# Patient Record
Sex: FEMALE | Race: WHITE | Employment: OTHER | URBAN - METROPOLITAN AREA
[De-identification: names, ages, dates, MRNs, and addresses within clinical notes are randomized per-mention and may not be internally consistent; named-entity substitution may affect disease eponyms.]

---

## 2017-01-18 ENCOUNTER — TRANSFERRED RECORDS (OUTPATIENT)
Dept: HEALTH INFORMATION MANAGEMENT | Facility: CLINIC | Age: 72
End: 2017-01-18

## 2017-01-24 NOTE — TELEPHONE ENCOUNTER
Medication was refilled on 12/25/2016, #90, with 3 additional refills.       amLODIPine (NORVASC) 2.5 MG tablet 90 tablet 3 12/22/2016

## 2017-01-25 RX ORDER — AMLODIPINE BESYLATE 2.5 MG/1
TABLET ORAL
Start: 2017-01-25

## 2017-02-02 ENCOUNTER — MEDICAL CORRESPONDENCE (OUTPATIENT)
Dept: HEALTH INFORMATION MANAGEMENT | Facility: CLINIC | Age: 72
End: 2017-02-02

## 2017-02-02 ENCOUNTER — TRANSFERRED RECORDS (OUTPATIENT)
Dept: HEALTH INFORMATION MANAGEMENT | Facility: CLINIC | Age: 72
End: 2017-02-02

## 2017-02-03 ENCOUNTER — OFFICE VISIT (OUTPATIENT)
Dept: FAMILY MEDICINE | Facility: CLINIC | Age: 72
End: 2017-02-03
Payer: COMMERCIAL

## 2017-02-03 ENCOUNTER — HOSPITAL ENCOUNTER (EMERGENCY)
Facility: CLINIC | Age: 72
Discharge: HOME | End: 2017-02-03
Attending: FAMILY MEDICINE | Admitting: FAMILY MEDICINE
Payer: COMMERCIAL

## 2017-02-03 ENCOUNTER — HOSPITAL ENCOUNTER (OUTPATIENT)
Facility: CLINIC | Age: 72
Setting detail: OBSERVATION
Discharge: HOME | End: 2017-02-03
Attending: INTERNAL MEDICINE | Admitting: INTERNAL MEDICINE
Payer: COMMERCIAL

## 2017-02-03 VITALS
SYSTOLIC BLOOD PRESSURE: 156 MMHG | RESPIRATION RATE: 16 BRPM | OXYGEN SATURATION: 97 % | TEMPERATURE: 98.4 F | WEIGHT: 154 LBS | HEART RATE: 108 BPM | BODY MASS INDEX: 26.29 KG/M2 | HEIGHT: 64 IN | DIASTOLIC BLOOD PRESSURE: 91 MMHG

## 2017-02-03 VITALS — OXYGEN SATURATION: 94 % | SYSTOLIC BLOOD PRESSURE: 174 MMHG | DIASTOLIC BLOOD PRESSURE: 96 MMHG

## 2017-02-03 VITALS
OXYGEN SATURATION: 96 % | SYSTOLIC BLOOD PRESSURE: 190 MMHG | TEMPERATURE: 97.9 F | DIASTOLIC BLOOD PRESSURE: 83 MMHG | RESPIRATION RATE: 16 BRPM

## 2017-02-03 DIAGNOSIS — G50.0 TRIGEMINAL NEURALGIA: ICD-10-CM

## 2017-02-03 DIAGNOSIS — G50.0 TRIGEMINAL NEURALGIA: Primary | ICD-10-CM

## 2017-02-03 DIAGNOSIS — R51.9 RIGHT FACIAL PAIN: ICD-10-CM

## 2017-02-03 DIAGNOSIS — R51.9 FACIAL PAIN, ACUTE: ICD-10-CM

## 2017-02-03 LAB
ALBUMIN SERPL-MCNC: 4.2 G/DL (ref 3.4–5)
ALP SERPL-CCNC: 71 U/L (ref 40–150)
ALT SERPL W P-5'-P-CCNC: 55 U/L (ref 0–50)
ANION GAP SERPL CALCULATED.3IONS-SCNC: 14 MMOL/L (ref 3–14)
AST SERPL W P-5'-P-CCNC: 16 U/L (ref 0–45)
BASOPHILS # BLD AUTO: 0 10E9/L (ref 0–0.2)
BASOPHILS NFR BLD AUTO: 0.4 %
BILIRUB SERPL-MCNC: 0.4 MG/DL (ref 0.2–1.3)
BUN SERPL-MCNC: 19 MG/DL (ref 7–30)
CALCIUM SERPL-MCNC: 9.5 MG/DL (ref 8.5–10.1)
CHLORIDE SERPL-SCNC: 103 MMOL/L (ref 94–109)
CO2 SERPL-SCNC: 23 MMOL/L (ref 20–32)
CREAT SERPL-MCNC: 0.88 MG/DL (ref 0.52–1.04)
CRP SERPL-MCNC: <2.9 MG/L (ref 0–8)
DIFFERENTIAL METHOD BLD: ABNORMAL
EOSINOPHIL # BLD AUTO: 0 10E9/L (ref 0–0.7)
EOSINOPHIL NFR BLD AUTO: 0.3 %
ERYTHROCYTE [DISTWIDTH] IN BLOOD BY AUTOMATED COUNT: 13.9 % (ref 10–15)
ERYTHROCYTE [SEDIMENTATION RATE] IN BLOOD BY WESTERGREN METHOD: 7 MM/H (ref 0–30)
GFR SERPL CREATININE-BSD FRML MDRD: 63 ML/MIN/1.7M2
GLUCOSE SERPL-MCNC: 109 MG/DL (ref 70–99)
HCT VFR BLD AUTO: 42.8 % (ref 35–47)
HGB BLD-MCNC: 14.5 G/DL (ref 11.7–15.7)
IMM GRANULOCYTES # BLD: 0 10E9/L (ref 0–0.4)
IMM GRANULOCYTES NFR BLD: 0.2 %
LYMPHOCYTES # BLD AUTO: 1.9 10E9/L (ref 0.8–5.3)
LYMPHOCYTES NFR BLD AUTO: 18.8 %
MCH RBC QN AUTO: 30.5 PG (ref 26.5–33)
MCHC RBC AUTO-ENTMCNC: 33.9 G/DL (ref 31.5–36.5)
MCV RBC AUTO: 90 FL (ref 78–100)
MONOCYTES # BLD AUTO: 0.9 10E9/L (ref 0–1.3)
MONOCYTES NFR BLD AUTO: 8.5 %
NEUTROPHILS # BLD AUTO: 7.2 10E9/L (ref 1.6–8.3)
NEUTROPHILS NFR BLD AUTO: 71.8 %
NRBC # BLD AUTO: 0.1 10*3/UL
NRBC BLD AUTO-RTO: 1 /100
PLATELET # BLD AUTO: 311 10E9/L (ref 150–450)
POTASSIUM SERPL-SCNC: 3.7 MMOL/L (ref 3.4–5.3)
PROT SERPL-MCNC: 7.9 G/DL (ref 6.8–8.8)
RBC # BLD AUTO: 4.75 10E12/L (ref 3.8–5.2)
SODIUM SERPL-SCNC: 140 MMOL/L (ref 133–144)
WBC # BLD AUTO: 10 10E9/L (ref 4–11)

## 2017-02-03 PROCEDURE — G0378 HOSPITAL OBSERVATION PER HR: HCPCS

## 2017-02-03 PROCEDURE — 99285 EMERGENCY DEPT VISIT HI MDM: CPT | Mod: 25 | Performed by: FAMILY MEDICINE

## 2017-02-03 PROCEDURE — 96374 THER/PROPH/DIAG INJ IV PUSH: CPT | Performed by: FAMILY MEDICINE

## 2017-02-03 PROCEDURE — 99207 ZZC NON-BILLABLE SERV PER CHARTING: CPT | Performed by: INTERNAL MEDICINE

## 2017-02-03 PROCEDURE — 85652 RBC SED RATE AUTOMATED: CPT | Performed by: FAMILY MEDICINE

## 2017-02-03 PROCEDURE — 96376 TX/PRO/DX INJ SAME DRUG ADON: CPT | Performed by: FAMILY MEDICINE

## 2017-02-03 PROCEDURE — 36415 COLL VENOUS BLD VENIPUNCTURE: CPT | Performed by: PHYSICIAN ASSISTANT

## 2017-02-03 PROCEDURE — 25000128 H RX IP 250 OP 636: Performed by: FAMILY MEDICINE

## 2017-02-03 PROCEDURE — 99284 EMERGENCY DEPT VISIT MOD MDM: CPT | Mod: Z6 | Performed by: FAMILY MEDICINE

## 2017-02-03 PROCEDURE — 85025 COMPLETE CBC W/AUTO DIFF WBC: CPT | Performed by: FAMILY MEDICINE

## 2017-02-03 PROCEDURE — 40000268 ZZH STATISTIC NO CHARGES

## 2017-02-03 PROCEDURE — 86140 C-REACTIVE PROTEIN: CPT | Performed by: FAMILY MEDICINE

## 2017-02-03 PROCEDURE — 80053 COMPREHEN METABOLIC PANEL: CPT | Performed by: FAMILY MEDICINE

## 2017-02-03 PROCEDURE — 85027 COMPLETE CBC AUTOMATED: CPT | Performed by: PHYSICIAN ASSISTANT

## 2017-02-03 PROCEDURE — 99215 OFFICE O/P EST HI 40 MIN: CPT | Performed by: INTERNAL MEDICINE

## 2017-02-03 RX ORDER — LIDOCAINE 40 MG/G
CREAM TOPICAL
Status: DISCONTINUED | OUTPATIENT
Start: 2017-02-03 | End: 2017-02-03

## 2017-02-03 RX ORDER — NALOXONE HYDROCHLORIDE 0.4 MG/ML
.1-.4 INJECTION, SOLUTION INTRAMUSCULAR; INTRAVENOUS; SUBCUTANEOUS
Status: DISCONTINUED | OUTPATIENT
Start: 2017-02-03 | End: 2017-02-03

## 2017-02-03 RX ORDER — OXYCODONE HYDROCHLORIDE 5 MG/1
5-10 TABLET ORAL
Status: DISCONTINUED | OUTPATIENT
Start: 2017-02-03 | End: 2017-02-03

## 2017-02-03 RX ORDER — HYDROMORPHONE HYDROCHLORIDE 1 MG/ML
0.5 INJECTION, SOLUTION INTRAMUSCULAR; INTRAVENOUS; SUBCUTANEOUS
Status: DISCONTINUED | OUTPATIENT
Start: 2017-02-03 | End: 2017-02-03 | Stop reason: HOSPADM

## 2017-02-03 RX ORDER — ONDANSETRON 4 MG/1
4 TABLET, ORALLY DISINTEGRATING ORAL EVERY 6 HOURS PRN
Status: DISCONTINUED | OUTPATIENT
Start: 2017-02-03 | End: 2017-02-03

## 2017-02-03 RX ORDER — ONDANSETRON 2 MG/ML
4 INJECTION INTRAMUSCULAR; INTRAVENOUS EVERY 6 HOURS PRN
Status: DISCONTINUED | OUTPATIENT
Start: 2017-02-03 | End: 2017-02-03

## 2017-02-03 RX ORDER — AMOXICILLIN 250 MG
1-2 CAPSULE ORAL 2 TIMES DAILY PRN
Status: DISCONTINUED | OUTPATIENT
Start: 2017-02-03 | End: 2017-02-03

## 2017-02-03 RX ORDER — HYDROMORPHONE HYDROCHLORIDE 2 MG/1
2 TABLET ORAL EVERY 4 HOURS PRN
Qty: 18 TABLET | Refills: 0 | Status: SHIPPED | OUTPATIENT
Start: 2017-02-03 | End: 2017-02-16

## 2017-02-03 RX ORDER — SODIUM CHLORIDE 9 MG/ML
INJECTION, SOLUTION INTRAVENOUS CONTINUOUS
Status: DISCONTINUED | OUTPATIENT
Start: 2017-02-03 | End: 2017-02-03

## 2017-02-03 RX ORDER — HYDROMORPHONE HYDROCHLORIDE 1 MG/ML
.3-.5 INJECTION, SOLUTION INTRAMUSCULAR; INTRAVENOUS; SUBCUTANEOUS
Status: DISCONTINUED | OUTPATIENT
Start: 2017-02-03 | End: 2017-02-03

## 2017-02-03 RX ORDER — PROCHLORPERAZINE MALEATE 5 MG
5 TABLET ORAL EVERY 6 HOURS PRN
Status: DISCONTINUED | OUTPATIENT
Start: 2017-02-03 | End: 2017-02-03

## 2017-02-03 RX ORDER — OXCARBAZEPINE 300 MG/1
300 TABLET, FILM COATED ORAL 2 TIMES DAILY
Qty: 60 TABLET | Refills: 0 | Status: SHIPPED | OUTPATIENT
Start: 2017-02-03 | End: 2017-03-03

## 2017-02-03 RX ORDER — PROCHLORPERAZINE 25 MG
12.5 SUPPOSITORY, RECTAL RECTAL EVERY 12 HOURS PRN
Status: DISCONTINUED | OUTPATIENT
Start: 2017-02-03 | End: 2017-02-03

## 2017-02-03 RX ORDER — ACETAMINOPHEN 325 MG/1
650 TABLET ORAL EVERY 4 HOURS PRN
Status: DISCONTINUED | OUTPATIENT
Start: 2017-02-03 | End: 2017-02-03

## 2017-02-03 RX ADMIN — HYDROMORPHONE HYDROCHLORIDE 0.5 MG: 10 INJECTION, SOLUTION INTRAMUSCULAR; INTRAVENOUS; SUBCUTANEOUS at 14:29

## 2017-02-03 RX ADMIN — HYDROMORPHONE HYDROCHLORIDE 1 MG: 1 INJECTION, SOLUTION INTRAMUSCULAR; INTRAVENOUS; SUBCUTANEOUS at 13:10

## 2017-02-03 ASSESSMENT — ENCOUNTER SYMPTOMS
FACIAL SWELLING: 0
ABDOMINAL PAIN: 0
FEVER: 0
ARTHRALGIAS: 0
DIFFICULTY URINATING: 0
CONFUSION: 0
TROUBLE SWALLOWING: 0
EYE REDNESS: 0
CHILLS: 0
SHORTNESS OF BREATH: 0
HEADACHES: 0
NECK STIFFNESS: 0
UNEXPECTED WEIGHT CHANGE: 1
COLOR CHANGE: 0

## 2017-02-03 NOTE — ED NOTES
"Pt seen at [facility] ED today for trigeminal neuralgia, admitted to OBS briefly, was not given any pain mediation at [facility]  Sent to Ed for pain relief and possible admission/surgery  Pt seen at pain clinic, given morphine, \"but it didn't do a thing\"  Arrives with ice pack applied to face  "

## 2017-02-03 NOTE — CONSULTS
Read through pts history. She has pain due to trigeminal neuralgia. She has been treating at the [facility] pain clinic with Dr. Carroll August.  She has had 5 trigeminal RF's.  She continues to have severe pain. I spoke to Dr. Unruly Atkinson directly on the phone. He states that he would not be able to treat or help her pain and would recommend she see someone at the [facility] neurosurgery group. I called to obs and spoke to the pts nurse and Chapincito Mendez PA-C. Recommend a transfer to the [facility] neurosurgery group. Please let me know if you need further assistance.       Latrice Meyer [facility]  [facility]  [address] [place] [address].  [id number]  Tel. [phone]  Fax [phone]

## 2017-02-03 NOTE — CONSULTS
[facility]       NEUROSURGERY CONSULTATION NOTE    This consultation was requested by Dr. Ruiz from the Emergency Medicine service.    Reason for Consultation:   Right sided trigeminal neuralgia    HPI:  Ms. Rodriguez is a 73 yo female with h/o HTN, MDD, anxiety, TMJ and right trigeminal neuralgia who presents with an acute exacerbation of trigeminal neuralgia.  She has had trigeminal neuralgia for about 10 years and has undergone multiple radiofrequency rhizotomies most recently 2016.  She more recently underwent trigeminal nerve block  with no pain relief.  The pain is triggered by eating, cold wind, and touching her face.  In the past, she trialed Gabapentin and Tegretol with no relief.  She has not tried Trileptal.  When I interviewed her in the ED her pain was much improved after pain medications.                  PAST MEDICAL HISTORY:   Past Medical History   Diagnosis Date     Hypertension      Anxiety      TMJ arthralgia      Iron deficiency anaemia      Had endoscopy and colonoscopy done in 2012      Arthritis      Cancer (H) Aug 2013     right breast       PAST SURGICAL HISTORY:   Past Surgical History   Procedure Laterality Date     Orthopedic surgery       Right TKA and Left TKA      section       Colonoscopy       She had colonoscopy and endoscopy done on November 15, 2012 for workup of iron deficiency anemia and the results were satisfactory     Mastectomy partial with sentinel node  2013     Procedure: MASTECTOMY PARTIAL WITH SENTINEL NODE;  RIGHT PARTIAL MASTECTOMY WITH RIGHT AXILLARY SENTINEL NODE BIOPSY;  Surgeon: Kae Padilla MD;  [facility]     Biopsy  13     right breast biopsy     Biopsy  13     right axillary lymph node     Breast surgery  13     right breast partial mastectomy/lumpectomy     Gyn surgery            Hysterectomy total abdominal, bilateral salpingo-oophorectomy,  combined  2006       FAMILY HISTORY:   Family History   Problem Relation Age of Onset     Cardiovascular Father      Alzheimer Disease Mother      Cancer - colorectal No family hx of      Breast Cancer No family hx of        SOCIAL HISTORY:   Social History   Substance Use Topics     Smoking status: Never Smoker      Smokeless tobacco: Never Used     Alcohol Use: 0.6 oz/week     1 Standard drinks or equivalent per week      Comment: 2-3 drinks wine per week       MEDICATIONS:  Current Outpatient Prescriptions   Medication Sig Dispense Refill     pravastatin (PRAVACHOL) 40 MG tablet Take 1 tablet (40 mg) by mouth daily 90 tablet 3     lisinopril (PRINIVIL/ZESTRIL) 40 MG tablet Take 1 tablet (40 mg) by mouth daily 90 tablet 3     amLODIPine (NORVASC) 2.5 MG tablet Take 1 tablet (2.5 mg) by mouth daily 90 tablet 3     LORazepam (ATIVAN) 1 MG tablet Take 0.5 tablets (0.5 mg) by mouth nightly as needed for anxiety 30 tablet 2     hydrochlorothiazide (HYDRODIURIL) 25 MG tablet Take 1 tablet (25 mg) by mouth daily 90 tablet 1     polyethylene glycol (MIRALAX) powder Take 17 g by mouth daily as needed 510 g 1     multivitamin, therapeutic with minerals (MULTI-VITAMIN) TABS Take 1 tablet by mouth daily       chromium 200 MCG CAPS Take 1 capsule (200 mcg) by mouth daily  0     Coenzyme Q10 (CO Q10) 200 MG CAPS Take 1 capsule by mouth daily       fish oil-omega-3 fatty acids (OMEGA 3) 1000 MG capsule Take 1 capsule (1 g) by mouth daily       ascorbic acid (VITAMIN C) 500 MG tablet Take 1 tablet (500 mg) by mouth daily       venlafaxine (EFFEXOR XR) 150 MG 24 hr capsule Take  by mouth daily.         Allergies:  Allergies   Allergen Reactions     Seafood Anaphylaxis     Femara [Letrozole] Rash     Latex Rash     WITH LATEX BANDAIDS     Tamoxifen Rash         ROS: 10 point ROS of systems including Constitutional, Eyes, Respiratory, Cardiovascular, Gastroenterology, Genitourinary, Integumentary, Muscularskeletal, Psychiatric were  all negative except for pertinent positives noted in my HPI.      PE:  Blood pressure 175/94, last menstrual period 09/01/2000, SpO2 94 %, not currently breastfeeding.  Gen: Elderly female laying in bed, not in acute distress  MS: A&Ox3, Speech is slow, intermittently confused    CN: Pupils round and reactive, extraocular movements intact, face symmetric, tongue midline, uvula & palate elevate symmetrically, sensation intact to light touch   Motor: 5/5 in b/l UE& LEs (see below)  Sensory: intact to light touch   No drift    Non labored breathing      Imaging:   MRI brain with and w/o contrast 11/6/2014  - No evidence of intracranial mass or demyelinating disease    ASSESSMENT:   Ms. Rodriguez is a 71 yo female with an acute exacerbation of trigeminal neuralgia.    RECOMMENDATIONS:  - Start Trileptal, Neurosurgery clinic will contact patient on Monday for titration schedule  - Schedule MRI with and without contrast as an outpatient next week  - Follow up in Neurosurgery with Dr. Casper 2/13, 2/14, or 2/15.  Neurosurgery clinic will contact patient to set this up on Monday      The patient was discussed with Dr. Theodore, neurosurgery chief resident, and she agrees with the above.    Jaime Vegas MD,PhD  Neurosurgery PGY-1        Please contact neurosurgery resident on call with questions.    Dial * [phone], enter [phone] when prompted.

## 2017-02-03 NOTE — PROGRESS NOTES
I was called earlier today by Dr. Villalobos to admit this patient for pain control and neurosurgery evaluation for complex and uncontrolled trigeminal neuralgia.  We discussed the case with neurosurgery CNP Latrice Meyer and they do not feel like they can care for the patient here and requested she go to [facility].  I discussed this with the patient and her  and she agrees that she will just go to the [facility] ED for evaluation and consideration of admission and further evaluation by neurosurgery at [facility]. I instructed the patient that she can go ahead and take two of her 10 mg morphine pills to help with the pain and her  will transport her to the [facility] ED for evaluation.  We will not do any admission orders and we will instead cancel this admission and I have requested that the patient not be bill for this encounter. I did call the [facility] ED and talked to Dr. Chester to let them know I am sending this patient to them and I called Dr. Villalobos to update her as well.    Davion Lozano MD   2/03/17 11:36 AM

## 2017-02-03 NOTE — DISCHARGE INSTRUCTIONS
Please make an appointment to follow up with Neurosurgery Clinic (phone: [phone]) as soon as possible to follow up on the trigeminal neuralgia.  Your labs are normal.  The neurosurgery clinic will contact you on Monday to make an appointment for MRI and an outpatient neurosurgery appointment.  Start the Trileptal twice a day to help reduce the pain.  The medication can make you tired, but this is expected.  You also have been given Dilaudid for the pain control and you can use this every 4-6 hours.  Should the pain become intractable or not controlled with these medicines before your next appointment come back to the ER to be seen.

## 2017-02-03 NOTE — ED PROVIDER NOTES
"  History     Chief Complaint   Patient presents with     Headache     trigeminal neuroalgia      HPI  Olivia Rodriguez is a 72 year old female with a medical history significant for trigeminal neuralgia who presents to the emergency department for evaluation of acute on chronic right-sided facial pain. Patient's  relates a nearly 10 year history of chronic right-sided facial pain from trigeminal neuralgia that he and his wife believe to have been provoked by dental work. Patient's  reports that the patient responded well to annual radio-frequency ablations in 2013, 2014, and 2014 (all through [facility]), but had no improvement whatsoever after the same procedures were done in 7/2016, 12/2016, and on 1/21/17 (2 weeks ago). Patient's  states that she then had a trigeminal nerve block with no improvement. Patient presents with documentation from [facility] clinic reporting that they were referring her to a neurosurgeon to discuss possible resection of the right trigeminal nerve due to failure of injections and opiate pain medication. Patient's  thinks that the patient's most recent MRI of the head and neck was 2 years ago, obtained somewhere in [place]. He does not recall any significant findings on that MRI.    Patient reports that her pain is greatest over the right cheek and right jaw. She denies radiation of the pain into the posterior neck or ear. She denies fever. Patient reports eating and opening her mouth are both very painful, and her  notes that she has lost 25 pounds in the last month. Patient's  notes that the patient's grandmother suffered from \"the exact same thing.\"     I have reviewed the Medications, Allergies, Past Medical and Surgical History, and Social History in the [employer] system.    PAST MEDICAL HISTORY:   Past Medical History   Diagnosis Date     Hypertension      Anxiety      TMJ arthralgia      Iron deficiency anaemia      Had endoscopy and " colonoscopy done in 2012      Arthritis      Cancer (H) Aug 2013     right breast     PAST SURGICAL HISTORY:   Past Surgical History   Procedure Laterality Date     Orthopedic surgery       Right TKA and Left TKA      section       Colonoscopy       She had colonoscopy and endoscopy done on November 15, 2012 for workup of iron deficiency anemia and the results were satisfactory     Mastectomy partial with sentinel node  2013     Procedure: MASTECTOMY PARTIAL WITH SENTINEL NODE;  RIGHT PARTIAL MASTECTOMY WITH RIGHT AXILLARY SENTINEL NODE BIOPSY;  Surgeon: Kae Padilla MD;  [facility]:  [facility]     Biopsy  13     right breast biopsy     Biopsy  13     right axillary lymph node     Breast surgery  13     right breast partial mastectomy/lumpectomy     Gyn surgery            Hysterectomy total abdominal, bilateral salpingo-oophorectomy, combined       FAMILY HISTORY:   Family History   Problem Relation Age of Onset     Cardiovascular Father      Alzheimer Disease Mother      Cancer - colorectal No family hx of      Breast Cancer No family hx of      SOCIAL HISTORY:   Social History   Substance Use Topics     Smoking status: Never Smoker      Smokeless tobacco: Never Used     Alcohol Use: 0.6 oz/week     1 Standard drinks or equivalent per week      Comment: 2-3 drinks wine per week     Discharge Medication List as of 2/3/2017  5:49 PM      START taking these medications    Details   HYDROmorphone (DILAUDID) 2 MG tablet Take 1 tablet (2 mg) by mouth every 4 hours as needed for pain maximum 4 tablet(s) per day, Disp-18 tablet, R-0, Local Print      OXcarbazepine (TRILEPTAL) 300 MG tablet Take 1 tablet (300 mg) by mouth 2 times daily, Disp-60 tablet, R-0, Local Print         CONTINUE these medications which have NOT CHANGED    Details   pravastatin (PRAVACHOL) 40 MG tablet Take 1 tablet (40 mg) by mouth daily, Disp-90 tablet, R-3, E-PrescribeProfile Rx: patient will contact  pharmacy when needed      lisinopril (PRINIVIL/ZESTRIL) 40 MG tablet Take 1 tablet (40 mg) by mouth daily, Disp-90 tablet, R-3, E-PrescribeProfile Rx: patient will contact pharmacy when needed      amLODIPine (NORVASC) 2.5 MG tablet Take 1 tablet (2.5 mg) by mouth daily, Disp-90 tablet, R-3, E-PrescribeProfile Rx: patient will contact pharmacy when needed      LORazepam (ATIVAN) 1 MG tablet Take 0.5 tablets (0.5 mg) by mouth nightly as needed for anxiety, Disp-30 tablet, R-2, Local Print      hydrochlorothiazide (HYDRODIURIL) 25 MG tablet Take 1 tablet (25 mg) by mouth daily, Disp-90 tablet, R-1, E-Prescribe      polyethylene glycol (MIRALAX) powder Take 17 g by mouth daily as needed, Disp-510 g, R-1, E-Prescribe      multivitamin, therapeutic with minerals (MULTI-VITAMIN) TABS Take 1 tablet by mouth daily, Historical      chromium 200 MCG CAPS Take 1 capsule (200 mcg) by mouth daily, R-0, Historical      Coenzyme Q10 (CO Q10) 200 MG CAPS Take 1 capsule by mouth daily, Historical      fish oil-omega-3 fatty acids (OMEGA 3) 1000 MG capsule Take 1 capsule (1 g) by mouth daily, Historical      ascorbic acid (VITAMIN C) 500 MG tablet Take 1 tablet (500 mg) by mouth daily, Historical      venlafaxine (EFFEXOR XR) 150 MG 24 hr capsule Take  by mouth daily., Historical           Allergies   Allergen Reactions     Seafood Anaphylaxis     Femara [Letrozole] Rash     Latex Rash     WITH LATEX BANDAIDS     Tamoxifen Rash     Review of Systems   Constitutional: Positive for unexpected weight change. Negative for fever and chills.   HENT: Negative for congestion, ear pain, facial swelling, mouth sores and trouble swallowing.         Right-sided facial pain   Eyes: Negative for redness.   Respiratory: Negative for shortness of breath.    Cardiovascular: Negative for chest pain.   Gastrointestinal: Negative for abdominal pain.   Genitourinary: Negative for difficulty urinating.   Musculoskeletal: Negative for arthralgias and neck  stiffness.   Skin: Negative for color change.   Neurological: Negative for headaches.   Psychiatric/Behavioral: Negative for confusion.       Physical Exam      Physical Exam   Constitutional: She is oriented to person, place, and time. No distress.   HENT:   Head: Atraumatic.   Mouth/Throat: Oropharynx is clear and moist. No oropharyngeal exudate.   Eyes: Pupils are equal, round, and reactive to light. No scleral icterus.   Cardiovascular: Normal heart sounds and intact distal pulses.    Pulmonary/Chest: Breath sounds normal. No respiratory distress.   Abdominal: Soft. Bowel sounds are normal. There is no tenderness.   Musculoskeletal: She exhibits no edema or tenderness.   Neurological: She is alert and oriented to person, place, and time. She has normal strength and normal reflexes. No cranial nerve deficit. She displays a negative Romberg sign. GCS eye subscore is 4. GCS verbal subscore is 5. GCS motor subscore is 6.   Patient has distinct tenderness overlying the nerve distribution of the right  Trigeminal nerve.  No numbness over the face and no motor deficits   Skin: Skin is warm. No rash noted. She is not diaphoretic.       ED Course     Procedures             Critical Care time:  none               Labs Ordered and Resulted from Time of ED Arrival Up to the Time of Departure from the ED   CBC WITH PLATELETS DIFFERENTIAL - Abnormal; Notable for the following:     Nucleated RBCs 1 (*)     All other components within normal limits   COMPREHENSIVE METABOLIC PANEL - Abnormal; Notable for the following:     Glucose 109 (*)     ALT 55 (*)     All other components within normal limits   ERYTHROCYTE SEDIMENTATION RATE AUTO   CRP INFLAMMATION       Assessments & Plan (with Medical Decision Making)  Right facial trigeminal nerve neuralgia:  This patient is a 72 year old female with a history of right facial pain not responsive to nerve ablation or nerve blocks.  See hx of HPI above.  The patient had stable vital signs  and appeared in significant distress on exam.  The patient was given several doses of IV Dilaudid with a good response.  The patient had labs including a CBC, CMP and ESR, CRP inflammatory marker all being normal.  The patient was seen by neurosurgery and they recommended that the patient be started on Trileptal 300 mg orally BID and they will see her in one week to see how she is responding.  They also discussed surgical options and recommended for an MRI to be set up in the outpatient setting.  The patient was comfortable at the time of discharge and was given Trileptal 300 mg orally BID #60 and also Dilaudid 2 mg every 6 hours for pain control if needed.  She was given instructions for follow up.  She was also given instructions for follow up and instructed on the use of the medications.        I have reviewed the nursing notes.    I have reviewed the findings, diagnosis, plan and need for follow up with the patient.    Discharge Medication List as of 2/3/2017  5:49 PM      START taking these medications    Details   HYDROmorphone (DILAUDID) 2 MG tablet Take 1 tablet (2 mg) by mouth every 4 hours as needed for pain maximum 4 tablet(s) per day, Disp-18 tablet, R-0, Local Print      OXcarbazepine (TRILEPTAL) 300 MG tablet Take 1 tablet (300 mg) by mouth 2 times daily, Disp-60 tablet, R-0, Local Print             Final diagnoses:   Trigeminal neuralgia   Right facial pain     IDany, am serving as a trained medical scribe to document services personally performed by David Ruiz MD, based on the provider's statements to me.   IDavid MD, was physically present and have reviewed and verified the accuracy of this note documented by Dany Leyva.  2/3/2017   [facility], [place], EMERGENCY DEPARTMENT      David Ruiz MD  02/03/17 [id number]

## 2017-02-03 NOTE — PROGRESS NOTES
Patient arrived on the unit @1020. Direct admission to observation unit.  Will page the MD for orders.

## 2017-02-03 NOTE — ED AVS SNAPSHOT
[facility], Emergency Department    [address] [address]    Phone:  [phone]                                       Olivia Rodriguez   MRN: [id number]    Department:  [facility], Emergency Department   Date of Visit:  2/3/2017           Patient Information     Date Of Birth          1945        Your diagnoses for this visit were:     Trigeminal neuralgia     Right facial pain        You were seen by David Ruiz MD.        Discharge Instructions       Please make an appointment to follow up with Neurosurgery Clinic (phone: [phone]) as soon as possible to follow up on the trigeminal neuralgia.  Your labs are normal.  The neurosurgery clinic will contact you on Monday to make an appointment for MRI and an outpatient neurosurgery appointment.  Start the Trileptal twice a day to help reduce the pain.  The medication can make you tired, but this is expected.  You also have been given Dilaudid for the pain control and you can use this every 4-6 hours.  Should the pain become intractable or not controlled with these medicines before your next appointment come back to the ER to be seen.       Future Appointments        Provider Department Dept Phone Center    3/27/2017 10:30 AM [facility] BREAST CTR MAMMO ROOM 6 [facility] [phone] [facility]      24 Hour Appointment Hotline       To make an appointment at any [facility] clinic, call [phone] ([phone]). If you don't have a family doctor or clinic, we will help you find one. [facility] clinics are conveniently located to serve the needs of you and your family.             Review of your medicines      START taking        Dose / Directions Last dose taken    HYDROmorphone 2 MG tablet   Commonly known as:  DILAUDID   Dose:  2 mg   Quantity:  18 tablet        Take 1 tablet (2 mg) by mouth every 4 hours as needed for pain maximum 4 tablet(s) per day   Refills:  0        OXcarbazepine 300 MG tablet    Commonly known as:  TRILEPTAL   Dose:  300 mg   Quantity:  60 tablet        Take 1 tablet (300 mg) by mouth 2 times daily   Refills:  0          Our records show that you are taking the medicines listed below. If these are incorrect, please call your family doctor or clinic.        Dose / Directions Last dose taken    amLODIPine 2.5 MG tablet   Commonly known as:  NORVASC   Dose:  2.5 mg   Quantity:  90 tablet        Take 1 tablet (2.5 mg) by mouth daily   Refills:  3        ascorbic acid 500 MG tablet   Commonly known as:  VITAMIN C   Dose:  500 mg        Take 1 tablet (500 mg) by mouth daily   Refills:  0        chromium 200 MCG Caps capsule   Dose:  200 mcg        Take 1 capsule (200 mcg) by mouth daily   Refills:  0        Co Q10 200 MG Caps   Dose:  1 capsule        Take 1 capsule by mouth daily   Refills:  0        EFFEXOR  MG 24 hr capsule   Generic drug:  venlafaxine        Take  by mouth daily.   Refills:  0        fish oil-omega-3 fatty acids 1000 MG capsule   Dose:  1 g        Take 1 capsule (1 g) by mouth daily   Refills:  0        hydrochlorothiazide 25 MG tablet   Commonly known as:  HYDRODIURIL   Dose:  25 mg   Quantity:  90 tablet        Take 1 tablet (25 mg) by mouth daily   Refills:  1        lisinopril 40 MG tablet   Commonly known as:  PRINIVIL/ZESTRIL   Dose:  40 mg   Quantity:  90 tablet        Take 1 tablet (40 mg) by mouth daily   Refills:  3        LORazepam 1 MG tablet   Commonly known as:  ATIVAN   Dose:  0.5 mg   Quantity:  30 tablet        Take 0.5 tablets (0.5 mg) by mouth nightly as needed for anxiety   Refills:  2        Multi-vitamin Tabs tablet   Dose:  1 tablet        Take 1 tablet by mouth daily   Refills:  0        polyethylene glycol powder   Commonly known as:  MIRALAX   Dose:  1 capful   Quantity:  510 g        Take 17 g by mouth daily as needed   Refills:  1        pravastatin 40 MG tablet   Commonly known as:  PRAVACHOL   Dose:  40 mg   Quantity:  90 tablet         "Take 1 tablet (40 mg) by mouth daily   Refills:  3                Prescriptions were sent or printed at these locations (2 Prescriptions)                   Other Prescriptions                Printed at Department/Unit printer (2 of 2)         HYDROmorphone (DILAUDID) 2 MG tablet               OXcarbazepine (TRILEPTAL) 300 MG tablet                Procedures and tests performed during your visit     CBC with platelets differential    CRP inflammation    Comprehensive metabolic panel    Erythrocyte sedimentation rate auto      Orders Needing Specimen Collection     None      Pending Results     No orders found from 2017 to 2017.            Pending Culture Results     No orders found from 2017 to 2017.            Thank you for choosing [facility]       Thank you for choosing [facility] for your care. Our goal is always to provide you with excellent care. Hearing back from our patients is one way we can continue to improve our services. Please take a few minutes to complete the written survey that you may receive in the mail after you visit with us. Thank you!        [employer] Information     [employer] lets you send messages to your doctor, view your test results, renew your prescriptions, schedule appointments and more. To sign up, go to [email or web address] . Click on \"Log in\" on the left side of the screen, which will take you to the Welcome page. Then click on \"Sign up Now\" on the right side of the page.     You will be asked to enter the access code listed below, as well as some personal information. Please follow the directions to create your username and password.     Your access code is: [email or web address]  Expires: 3/22/2017  2:47 PM     Your access code will  in 90 days. If you need help or a new code, please call your [facility] clinic or [phone].        Care EveryWhere ID     This is your Care EveryWhere ID. This could be used by other organizations to access your [facility] medical " records  [id number]        After Visit Summary       This is your record. Keep this with you and show to your community pharmacist(s) and doctor(s) at your next visit.

## 2017-02-03 NOTE — ED NOTES
Bed: IN01  Expected date: 2/3/17  Expected time: 12:30 PM  Means of arrival: Car  Comments:  CC:   Patient 72 y female with trigeminal neuralgia (complex) seen at [facility] for this.  Patient came to [facility] expecting admission, but neurosurgery doesn't care for [place] patients.  Patient not in ER or inpatient and was directed here for care.  Patient has 25 # weight loss, unable to eat because of the pain.  Flare up ongoing for 2 weeks.  ETA:  One half hour   Sender/Contact Info:  [facility] hospitalist            Wants Callback (Y/N)? No  Plan:  Evaluate and treat for pain, neurosurgery consult   Call taken by: David Ruiz MD   Patient Name:  Jairo Rodriguez  MR#  [id number]

## 2017-02-03 NOTE — ED AVS SNAPSHOT
[facility], [place], Emergency Department    [address] [place] [address]    Phone:  [phone]                                       Olivia Rodriguez   MRN: [id number]    Department:  [facility], Emergency Department   Date of Visit:  2/3/2017           After Visit Summary Signature Page     I have received my discharge instructions, and my questions have been answered. I have discussed any challenges I see with this plan with the nurse or doctor.    ..........................................................................................................................................  Patient/Patient Representative Signature      ..........................................................................................................................................  Patient Representative Print Name and Relationship to Patient    ..................................................               ................................................  Date                                            Time    ..........................................................................................................................................  Reviewed by Signature/Title    ...................................................              ..............................................  Date                                                            Time

## 2017-02-03 NOTE — ED NOTES
Bed: ED17  Expected date: 2/3/17  Expected time:   Means of arrival:   Comments:  Olivia Rodriguez  Trigeminal neuralgia

## 2017-02-03 NOTE — PROGRESS NOTES
[facility]  They have left the unit.

## 2017-02-03 NOTE — PROGRESS NOTES
SUBJECTIVE:                                                    Olivia Rodriguez is a 72 year old female who presents to clinic today for the following health issues:    This patient is accompanied in the office by her . Her  provided most of the information as the patient herself has underlying memory issues and is also under severe pain.    Right sided facial pain  Patient is here for a referral to neurosurgery per [facility] pain clinic recommendation  Currently experiencing intractable right sided facial pain   States the only thing that provides minimal relief is heat  Otherwise pain is worsened by talking, eating and cold  She has hx of chronic atypical right sided facial pain, dx as chronic trigeminal neuralgia  Has seen neurologists at [facility], [facility] and Head and neck clinic in [place], also dentists at [facility] and most recently was following with Dr. August at [facility] pain clinic   Has also tried Gabapentin, Amitriptyline, Morphine and many other medications in the past but nothing seemed to alleviate pain   Also tried wearing mouthguard with no relief   Has had radio frequency ablations x5, these provided temporary control in the past and pain was manageable but the last 2 RF's (most recent ones in July and August 2016) were not effective  Her most recent procedure was Trigeminal ganglion nerve block with steroids on 1/25, this was also not effective and actually worsened her symptoms   [facility] unfortunately cannot offer any additional therapeutical options as they exhausted all of their resources and recommended to follow with neurosurgery   Her  is significantly worried as her pain is severely affecting her quality of life   She is unable to eat due to pain and has lost significant amount of weight, ~25 lbs in the past 2 months       Problem list and histories reviewed & adjusted, as indicated.  Additional history: as documented    Patient  Active Problem List   Diagnosis     Major depressive disorder, recurrent episode, moderate (H)     TMJ (temporomandibular joint syndrome)     Hyperlipidemia LDL goal <130     S/P total knee arthroplasty     S/P HEATHER-BSO (total abdominal hysterectomy and bilateral salpingo-oophorectomy)     Sensation of feeling cold     Health Care Home     Anxiety     Invasive ductal carcinoma of breast (H)     Memory difficulty     Controlled substance agreement signed     Essential hypertension     Coronary artery calcification     Hyperlipidemia LDL goal <100     Lung nodule     Malignant neoplasm of upper-outer quadrant of right female breast (H)     Past Surgical History   Procedure Laterality Date     Orthopedic surgery       Right TKA and Left TKA      section       Colonoscopy       She had colonoscopy and endoscopy done on November 15, 2012 for workup of iron deficiency anemia and the results were satisfactory     Mastectomy partial with sentinel node  2013     Procedure: MASTECTOMY PARTIAL WITH SENTINEL NODE;  RIGHT PARTIAL MASTECTOMY WITH RIGHT AXILLARY SENTINEL NODE BIOPSY;  Surgeon: Kae Padilla MD;  Location:  [facility]     Biopsy  13     right breast biopsy     Biopsy  13     right axillary lymph node     Breast surgery  13     right breast partial mastectomy/lumpectomy     Gyn surgery            Hysterectomy total abdominal, bilateral salpingo-oophorectomy, combined         Social History   Substance Use Topics     Smoking status: Never Smoker      Smokeless tobacco: Never Used     Alcohol Use: 0.6 oz/week     1 Standard drinks or equivalent per week      Comment: 2-3 drinks wine per week     Family History   Problem Relation Age of Onset     Cardiovascular Father      Alzheimer Disease Mother      Cancer - colorectal No family hx of      Breast Cancer No family hx of          Current Outpatient Prescriptions   Medication Sig Dispense Refill     lisinopril (PRINIVIL/ZESTRIL) 40 MG  tablet Take 1 tablet (40 mg) by mouth daily 90 tablet 3     pravastatin (PRAVACHOL) 40 MG tablet Take 1 tablet (40 mg) by mouth daily 90 tablet 3     amLODIPine (NORVASC) 2.5 MG tablet Take 1 tablet (2.5 mg) by mouth daily 90 tablet 3     LORazepam (ATIVAN) 1 MG tablet Take 0.5 tablets (0.5 mg) by mouth nightly as needed for anxiety 30 tablet 2     hydrochlorothiazide (HYDRODIURIL) 25 MG tablet Take 1 tablet (25 mg) by mouth daily 90 tablet 1     sulfamethoxazole-trimethoprim (BACTRIM DS,SEPTRA DS) 800-160 MG per tablet Take one half tablet after intercourse to prevent urinary tract infection. 30 tablet 1     polyethylene glycol (MIRALAX) powder Take 17 g by mouth daily as needed 510 g 1     multivitamin, therapeutic with minerals (MULTI-VITAMIN) TABS Take 1 tablet by mouth daily       chromium 200 MCG CAPS Take 1 capsule (200 mcg) by mouth daily  0     Coenzyme Q10 (CO Q10) 200 MG CAPS Take 1 capsule by mouth daily       fish oil-omega-3 fatty acids (OMEGA 3) 1000 MG capsule Take 1 capsule (1 g) by mouth daily       ascorbic acid (VITAMIN C) 500 MG tablet Take 1 tablet (500 mg) by mouth daily       venlafaxine (EFFEXOR XR) 150 MG 24 hr capsule Take  by mouth daily.       Allergies   Allergen Reactions     Seafood Anaphylaxis     Femara [Letrozole] Rash     Latex Rash     WITH LATEX BANDAIDS     Tamoxifen Rash       ROS:  Constitutional, neuro, ENT, endocrine, pulmonary, cardiac, gastrointestinal, genitourinary, musculoskeletal, integument and psychiatric systems are negative, except as otherwise noted.    This document serves as a record of the services and decisions personally performed and made by Ame Villalobos MD. It was created on her behalf by Kay Perdomo, a trained medical scribe. The creation of this document is based the provider's statements to the medical scribe.    Priscilla Perdomo 9:16 AM, February 3, 2017    OBJECTIVE:                                                    /91 mmHg  Pulse  "108  Temp(Src) 98.4  F (36.9  C) (Oral)  Resp 16  Ht 5' 3.5\" (1.613 m)  Wt 154 lb (69.854 kg)  BMI 26.85 kg/m2  SpO2 97%  LMP 09/01/2000  Body mass index is 26.85 kg/(m^2).  GENERAL APPEARANCE: appears in pain and uncomfortable, alert, using heat pack over right side of her face   It is difficult for her to speak due to facial pain   PSYCH: mentation appears normal, affect flat and anxious       ASSESSMENT/PLAN:                                                    Olivia was seen today for referral.    Diagnoses and all orders for this visit:    Trigeminal neuralgia  Has hx of chronic atypical right sided facial pain, dx as chronic trigeminal neuralgia  Pain is intractable and severely affecting her quality of life   Unable to eat due to pain and has lost significant amount of weight, ~25 lbs in the past 2 months   Has had RF ablations x5 which provided temporary control in the past and pain was manageable but the last 2 RF's (most recent ones in July and August 2016) were not effective  Her most recent procedure was Trigeminal ganglion nerve block with steroids on 1/25, also not effective and actually worsened her symptoms   Has seen neurologists at [facility], [facility] and Head and neck clinic in [place], also dentists at [facility], [place], operated by [facility] and most recently was following with Dr. August at [facility] pain clinic   Tried Gabapentin, Amitriptyline, Morphine and many other medications in the past but nothing seemed to alleviate pain. Also tried wearing mouthguard with no relief.   [facility] unfortunately cannot offer any additional therapeutical options as they exhausted all of their resources and recommended to follow with neurosurgery   -     NEUROSURGERY REFERRAL    Facial pain, acute  I spoke with neurosurgeon office and the sooner they can see her is on Monday  Given her severe intractable pain, waiting until Monday without any intervention will be very hard on her I believe it is " appropriate to admit her for pain control and observation  I spoke to the hospitalist and explained the situation and she was accepted as direct admission for pain management.  But then they sent her to ED of  as they have surgeons who deals with this kind of patients   She was evaluated by neurosurgery department to consider possible admission but they determined they unfortunately cannot help her and was referred to [facility] for further management.   -     NEUROSURGERY REFERRAL    Spoke to patient 's  later in the day and they were happy about that neurosurgeon has seen patient and gave appointment for follow up   Per patient 's  dilaudid worked the best and pain is under control.  He was very appreciative of phone call and follow up   The total visit time was 45 minutes more  than 50% was spent in counseling and coordination of care as discussed above.    The information in this document, created by the medical scribe for me, accurately reflects the services I personally performed and the decisions made by me. I have reviewed and approved this document for accuracy prior to leaving the patient care area.  Ame Villalobos MD  9:27 AM, 02/03/2017    Ame Villalobos MD  [facility]

## 2017-02-06 DIAGNOSIS — G50.0 TRIGEMINAL NEURALGIA: Primary | ICD-10-CM

## 2017-02-06 NOTE — PATIENT INSTRUCTIONS
Spoke with [facility]  and he reports that she is much better this morning with control of facial pain.  Education provided about length of time to take to evaluate efficacy of each change in dose of trileptal.  She is at 300 mg po BID. She will increase her dose on 2/8 to 300 mg po TID, and then update me on 2/10, to consider and discuss another dose increase.   We will obtain another MRI and see her in follow up next week.  They have my contact information.

## 2017-02-07 ENCOUNTER — HOSPITAL ENCOUNTER (OUTPATIENT)
Dept: MRI IMAGING | Facility: CLINIC | Age: 72
Discharge: HOME | End: 2017-02-07
Attending: NEUROLOGICAL SURGERY | Admitting: NEUROLOGICAL SURGERY
Payer: COMMERCIAL

## 2017-02-07 DIAGNOSIS — G50.0 TRIGEMINAL NEURALGIA: ICD-10-CM

## 2017-02-07 PROCEDURE — A9585 GADOBUTROL INJECTION: HCPCS | Performed by: NEUROLOGICAL SURGERY

## 2017-02-07 PROCEDURE — 70553 MRI BRAIN STEM W/O & W/DYE: CPT

## 2017-02-07 PROCEDURE — 25500064 ZZH RX 255 OP 636: Performed by: NEUROLOGICAL SURGERY

## 2017-02-07 RX ORDER — GADOBUTROL 604.72 MG/ML
7 INJECTION INTRAVENOUS ONCE
Status: COMPLETED | OUTPATIENT
Start: 2017-02-07 | End: 2017-02-07

## 2017-02-07 RX ADMIN — GADOBUTROL 7 ML: 604.72 INJECTION INTRAVENOUS at 20:30

## 2017-02-07 ASSESSMENT — ENCOUNTER SYMPTOMS
BACK PAIN: 0
NERVOUS/ANXIOUS: 1
FEVER: 0
WEIGHT GAIN: 0
WEIGHT LOSS: 1
POLYPHAGIA: 0
MYALGIAS: 0
NECK PAIN: 1
ARTHRALGIAS: 1
POLYDIPSIA: 0
NIGHT SWEATS: 0
INCREASED ENERGY: 1
DECREASED CONCENTRATION: 1
DEPRESSION: 1
MUSCLE CRAMPS: 0
STIFFNESS: 0
CHILLS: 0
MUSCLE WEAKNESS: 0
JOINT SWELLING: 0
SINUS CONGESTION: 0
SINUS PAIN: 0
TASTE DISTURBANCE: 0
SORE THROAT: 1
HALLUCINATIONS: 0
DECREASED APPETITE: 1
SMELL DISTURBANCE: 0
TROUBLE SWALLOWING: 1
FATIGUE: 1
ALTERED TEMPERATURE REGULATION: 0
INSOMNIA: 1
HOARSE VOICE: 0
NECK MASS: 0
PANIC: 0

## 2017-02-13 ENCOUNTER — TELEPHONE (OUTPATIENT)
Dept: NEUROSURGERY | Facility: CLINIC | Age: 72
End: 2017-02-13

## 2017-02-13 NOTE — TELEPHONE ENCOUNTER
Olivia is on 300 mg po BID with fair management of her symptoms.  The pain is less frequent and less severe although she still has some minor pain flares.  I encourage Kevin to leave her at this dose because she is having some drowsiness and difficulty concentrating.  She will see Dr Casper tomorrow in clinic.

## 2017-02-14 ENCOUNTER — OFFICE VISIT (OUTPATIENT)
Dept: NEUROSURGERY | Facility: CLINIC | Age: 72
End: 2017-02-14

## 2017-02-14 VITALS
HEIGHT: 63 IN | SYSTOLIC BLOOD PRESSURE: 145 MMHG | RESPIRATION RATE: 20 BRPM | DIASTOLIC BLOOD PRESSURE: 79 MMHG | HEART RATE: 116 BPM | OXYGEN SATURATION: 94 % | WEIGHT: 153 LBS | TEMPERATURE: 97.5 F | BODY MASS INDEX: 27.11 KG/M2

## 2017-02-14 DIAGNOSIS — G50.0 TRIGEMINAL NEURALGIA: Primary | ICD-10-CM

## 2017-02-14 ASSESSMENT — PAIN SCALES - GENERAL: PAINLEVEL: SEVERE PAIN (7)

## 2017-02-14 NOTE — LETTER
2017       RE: Olivia Rodriguez  [address]   [place] [address]     Dear Colleague,    Thank you for referring your patient, Olivia Rodriguez, to the [facility] NEUROSURGERY at [facility]. Please see a copy of my visit note below.    HISTORY OF PRESENT ILLNESS:  Ms. Rodriguez is a 72-year-old female seen for followup after her trigeminal neuralgia attack 2017 in the emergency room.  Since that time, we had recommended starting and titrating up on Trileptal.  She has done this and notes that she has had increasing somnolence, but has noted a slight decrease in terms of the severe pain that she was experiencing.  The patient is fatigued and very emotional during her clinic visit.  The types of surgical interventions were discussed with the patient, as well as a recommendation for a percutaneous radiofrequency rhizotomy.  The patient and her  decided to think about proceeding with surgery.  The number for the clinic was given to them and we will wait to hear from them.      DICTATED BY:  Jorge Mraie M.D., Resident         SHERRY GUERRERO MD       As dictated by JORGE MARIE MD        D: 2017 19:39   T: 2017 12:05   MT: NICOLE      Name:     OLIVIA RODRIGEUZ   MRN:      [id number]        Account:      [id number]   :      1945           Service Date: 2017      Document: [id number]

## 2017-02-14 NOTE — MR AVS SNAPSHOT
After Visit Summary   2/14/2017    Olivia Rodriguez    MRN: [id number]           Patient Information     Date Of Birth          1945        Visit Information        Provider Department      2/14/2017 3:00 PM Tong Casper MD [facility] Neurosurgery        Today's Diagnoses     Trigeminal neuralgia    -  1       Follow-ups after your visit        Your next 10 appointments already scheduled     Mar 27, 2017 10:30 AM CDT   MA SCREENING BILATERAL W/ SIRISHA with SHBCMA6   [facility] [place] ([facility])    [address] [place] [address], [address]  [facility] [address] [phone]           Do not use any powder, lotion or deodorant under your arms or on your breast. If you do, we will ask you to remove it before your exam.  Wear comfortable, two-piece clothing.  If you have any allergies, tell your care team.  Bring any previous mammograms from other facilities or have them mailed to the breast center.              Who to contact     Please call your clinic at [phone] to:    Ask questions about your health    Make or cancel appointments    Discuss your medicines    Learn about your test results    Speak to your doctor   If you have compliments or concerns about an experience at your clinic, or if you wish to file a complaint, please contact [facility] Patient Relations at [phone] or email us at [email or web address]         Additional Information About Your Visit        MyChart Information     [employer] gives you secure access to your electronic health record. If you see a primary care provider, you can also send messages to your care team and make appointments. If you have questions, please call your primary care clinic.  If you do not have a primary care provider, please call [phone] and they will assist you.      [employer] is an electronic gateway that provides easy, online access to your medical records. With [employer],  "you can request a clinic appointment, read your test results, renew a prescription or communicate with your care team.     To access your existing account, please contact your [facility] or call [phone] for assistance.        Care EveryWhere ID     This is your Care EveryWhere ID. This could be used by other organizations to access your [facility] medical records  [id number]        Your Vitals Were     Pulse Temperature Respirations Height Last Period Pulse Oximetry    116 97.5  F (36.4  C) (Oral) 20 1.6 m (5' 3\") 09/01/2000 94%    Breastfeeding? BMI (Body Mass Index)                No 27.1 kg/m2           Blood Pressure from Last 3 Encounters:   03/03/17 108/65   02/17/17 (!) 159/94   02/14/17 145/79    Weight from Last 3 Encounters:   03/03/17 66.7 kg (147 lb)   02/17/17 69.6 kg (153 lb 7 oz)   02/14/17 69.4 kg (153 lb)              Today, you had the following     No orders found for display       Primary Care Provider Office Phone # Fax #    Ame GIANCARLO Villalobos [phone]       [facility]    [address]  [facility] [address]        Thank you!     Thank you for choosing [facility]  for your care. Our goal is always to provide you with excellent care. Hearing back from our patients is one way we can continue to improve our services. Please take a few minutes to complete the written survey that you may receive in the mail after your visit with us. Thank you!             Your Updated Medication List - Protect others around you: Learn how to safely use, store and throw away your medicines at www.disposemymeds.org.          This list is accurate as of: 2/14/17 11:59 PM.  Always use your most recent med list.                   Brand Name Dispense Instructions for use    amLODIPine 2.5 MG tablet    NORVASC    90 tablet    Take 1 tablet (2.5 mg) by mouth daily       EFFEXOR  MG 24 hr capsule   Generic drug:  venlafaxine "      Take by mouth daily (with breakfast)       hydrochlorothiazide 25 MG tablet    HYDRODIURIL    90 tablet    Take 1 tablet (25 mg) by mouth daily       lisinopril 40 MG tablet    PRINIVIL/ZESTRIL    90 tablet    Take 1 tablet (40 mg) by mouth daily       polyethylene glycol powder    MIRALAX    510 g    Take 17 g by mouth daily as needed       pravastatin 40 MG tablet    PRAVACHOL    90 tablet    Take 1 tablet (40 mg) by mouth daily

## 2017-02-15 ENCOUNTER — CARE COORDINATION (OUTPATIENT)
Dept: NEUROSURGERY | Facility: CLINIC | Age: 72
End: 2017-02-15

## 2017-02-15 DIAGNOSIS — G50.0 TRIGEMINAL NEURALGIA: Primary | ICD-10-CM

## 2017-02-16 ENCOUNTER — TELEPHONE (OUTPATIENT)
Dept: NEUROSURGERY | Facility: CLINIC | Age: 72
End: 2017-02-16

## 2017-02-16 ENCOUNTER — TELEPHONE (OUTPATIENT)
Dept: OTHER | Facility: CLINIC | Age: 72
End: 2017-02-16

## 2017-02-16 NOTE — TELEPHONE ENCOUNTER
Olivia Rodriguez and her  called me yesterday to express interest in moving forward with a percutaneous radiofrequency rhyzotomy tomorrow.    I saw Olivia initially a couple weeks ago in our ER.  We started her on Trileptal for her trigeminal neuralgia.  While this has worked to some degree she continues to have severe facial pain and is also having side effects from the Trileptal.      When I saw her in clinic on Wed she was having a lot of pain and needed more time to process our conversation.  At that time I recommended to her that we proceed with a percutaneous radiofrequency rhizotomy.  I used the [facility] webpage to go through all aspects of the procedure including risks such as infection, blindness, injury to the brain.  I also explained that numbness following the procedure is expected and expected for life.    Yesterday when they called they both expressed and interest in moving forward with the radiofrequency rhizotomy.  They understand the risks and benefits including the expected numbness.     I will schedule her for this coming Friday.

## 2017-02-16 NOTE — TELEPHONE ENCOUNTER
I reviewed pre-operative instruction with Olivia's , Kevin.  The are ready for her procedure tomorrow and all questions have been answered.   They have my contact information.

## 2017-02-17 ENCOUNTER — ANESTHESIA (OUTPATIENT)
Dept: SURGERY | Facility: CLINIC | Age: 72
End: 2017-02-17
Payer: COMMERCIAL

## 2017-02-17 ENCOUNTER — APPOINTMENT (OUTPATIENT)
Dept: INTERVENTIONAL RADIOLOGY/VASCULAR | Facility: CLINIC | Age: 72
End: 2017-02-17
Attending: NEUROLOGICAL SURGERY
Payer: COMMERCIAL

## 2017-02-17 ENCOUNTER — ANESTHESIA EVENT (OUTPATIENT)
Dept: SURGERY | Facility: CLINIC | Age: 72
End: 2017-02-17
Payer: COMMERCIAL

## 2017-02-17 ENCOUNTER — HOSPITAL ENCOUNTER (OUTPATIENT)
Facility: CLINIC | Age: 72
Discharge: HOME | End: 2017-02-17
Attending: ANESTHESIOLOGY | Admitting: ANESTHESIOLOGY
Payer: COMMERCIAL

## 2017-02-17 VITALS
SYSTOLIC BLOOD PRESSURE: 159 MMHG | RESPIRATION RATE: 18 BRPM | TEMPERATURE: 98 F | OXYGEN SATURATION: 94 % | WEIGHT: 153.44 LBS | BODY MASS INDEX: 26.2 KG/M2 | HEIGHT: 64 IN | DIASTOLIC BLOOD PRESSURE: 94 MMHG

## 2017-02-17 DIAGNOSIS — G50.0 TRIGEMINAL NEURALGIA: ICD-10-CM

## 2017-02-17 LAB
APTT PPP: 28 SEC (ref 22–37)
HGB BLD-MCNC: 12.9 G/DL (ref 11.7–15.7)
INR PPP: 0.98 (ref 0.86–1.14)
POTASSIUM SERPL-SCNC: 3.1 MMOL/L (ref 3.4–5.3)

## 2017-02-17 PROCEDURE — 84132 ASSAY OF SERUM POTASSIUM: CPT | Performed by: STUDENT IN AN ORGANIZED HEALTH CARE EDUCATION/TRAINING PROGRAM

## 2017-02-17 PROCEDURE — 71000027 ZZH RECOVERY PHASE 2 EACH 15 MINS

## 2017-02-17 PROCEDURE — 71000014 ZZH RECOVERY PHASE 1 LEVEL 2 FIRST HR

## 2017-02-17 PROCEDURE — 85610 PROTHROMBIN TIME: CPT | Performed by: STUDENT IN AN ORGANIZED HEALTH CARE EDUCATION/TRAINING PROGRAM

## 2017-02-17 PROCEDURE — 85018 HEMOGLOBIN: CPT | Performed by: STUDENT IN AN ORGANIZED HEALTH CARE EDUCATION/TRAINING PROGRAM

## 2017-02-17 PROCEDURE — 25000125 ZZHC RX 250: Performed by: NURSE ANESTHETIST, CERTIFIED REGISTERED

## 2017-02-17 PROCEDURE — 25800025 ZZH RX 258: Performed by: NURSE ANESTHETIST, CERTIFIED REGISTERED

## 2017-02-17 PROCEDURE — 36415 COLL VENOUS BLD VENIPUNCTURE: CPT | Performed by: STUDENT IN AN ORGANIZED HEALTH CARE EDUCATION/TRAINING PROGRAM

## 2017-02-17 PROCEDURE — 25000128 H RX IP 250 OP 636: Performed by: NURSE ANESTHETIST, CERTIFIED REGISTERED

## 2017-02-17 PROCEDURE — 77002 NEEDLE LOCALIZATION BY XRAY: CPT | Mod: TC

## 2017-02-17 PROCEDURE — 37000009 ZZH ANESTHESIA TECHNICAL FEE, EACH ADDTL 15 MIN

## 2017-02-17 PROCEDURE — 85730 THROMBOPLASTIN TIME PARTIAL: CPT | Performed by: STUDENT IN AN ORGANIZED HEALTH CARE EDUCATION/TRAINING PROGRAM

## 2017-02-17 PROCEDURE — 27210905 ZZH KIT CR7

## 2017-02-17 PROCEDURE — 40000170 ZZH STATISTIC PRE-PROCEDURE ASSESSMENT II

## 2017-02-17 PROCEDURE — 25000125 ZZHC RX 250: Performed by: ANESTHESIOLOGY

## 2017-02-17 PROCEDURE — 37000008 ZZH ANESTHESIA TECHNICAL FEE, 1ST 30 MIN

## 2017-02-17 PROCEDURE — 25000128 H RX IP 250 OP 636: Performed by: STUDENT IN AN ORGANIZED HEALTH CARE EDUCATION/TRAINING PROGRAM

## 2017-02-17 RX ORDER — DEXAMETHASONE SODIUM PHOSPHATE 10 MG/ML
10 INJECTION, SOLUTION INTRAMUSCULAR; INTRAVENOUS ONCE
Status: COMPLETED | OUTPATIENT
Start: 2017-02-17 | End: 2017-02-17

## 2017-02-17 RX ORDER — SODIUM CHLORIDE, SODIUM LACTATE, POTASSIUM CHLORIDE, CALCIUM CHLORIDE 600; 310; 30; 20 MG/100ML; MG/100ML; MG/100ML; MG/100ML
INJECTION, SOLUTION INTRAVENOUS CONTINUOUS PRN
Status: DISCONTINUED | OUTPATIENT
Start: 2017-02-17 | End: 2017-02-17

## 2017-02-17 RX ORDER — MEPERIDINE HYDROCHLORIDE 25 MG/ML
12.5 INJECTION INTRAMUSCULAR; INTRAVENOUS; SUBCUTANEOUS
Status: DISCONTINUED | OUTPATIENT
Start: 2017-02-17 | End: 2017-02-17 | Stop reason: HOSPADM

## 2017-02-17 RX ORDER — GLYCOPYRROLATE 0.2 MG/ML
INJECTION, SOLUTION INTRAMUSCULAR; INTRAVENOUS PRN
Status: DISCONTINUED | OUTPATIENT
Start: 2017-02-17 | End: 2017-02-17

## 2017-02-17 RX ORDER — ONDANSETRON 4 MG/1
4 TABLET, ORALLY DISINTEGRATING ORAL EVERY 30 MIN PRN
Status: DISCONTINUED | OUTPATIENT
Start: 2017-02-17 | End: 2017-02-17 | Stop reason: HOSPADM

## 2017-02-17 RX ORDER — LABETALOL HYDROCHLORIDE 5 MG/ML
INJECTION, SOLUTION INTRAVENOUS PRN
Status: DISCONTINUED | OUTPATIENT
Start: 2017-02-17 | End: 2017-02-17

## 2017-02-17 RX ORDER — HYDROMORPHONE HYDROCHLORIDE 1 MG/ML
.3-.5 INJECTION, SOLUTION INTRAMUSCULAR; INTRAVENOUS; SUBCUTANEOUS EVERY 10 MIN PRN
Status: DISCONTINUED | OUTPATIENT
Start: 2017-02-17 | End: 2017-02-17 | Stop reason: HOSPADM

## 2017-02-17 RX ORDER — ESMOLOL HYDROCHLORIDE 10 MG/ML
INJECTION INTRAVENOUS PRN
Status: DISCONTINUED | OUTPATIENT
Start: 2017-02-17 | End: 2017-02-17

## 2017-02-17 RX ORDER — CEFAZOLIN SODIUM 1 G/3ML
1 INJECTION, POWDER, FOR SOLUTION INTRAMUSCULAR; INTRAVENOUS SEE ADMIN INSTRUCTIONS
Status: DISCONTINUED | OUTPATIENT
Start: 2017-02-17 | End: 2017-02-17 | Stop reason: HOSPADM

## 2017-02-17 RX ORDER — ACETAMINOPHEN 325 MG/1
650 TABLET ORAL
Status: DISCONTINUED | OUTPATIENT
Start: 2017-02-17 | End: 2017-02-17 | Stop reason: HOSPADM

## 2017-02-17 RX ORDER — ONDANSETRON 2 MG/ML
4 INJECTION INTRAMUSCULAR; INTRAVENOUS EVERY 30 MIN PRN
Status: DISCONTINUED | OUTPATIENT
Start: 2017-02-17 | End: 2017-02-17 | Stop reason: HOSPADM

## 2017-02-17 RX ORDER — SODIUM CHLORIDE, SODIUM LACTATE, POTASSIUM CHLORIDE, CALCIUM CHLORIDE 600; 310; 30; 20 MG/100ML; MG/100ML; MG/100ML; MG/100ML
INJECTION, SOLUTION INTRAVENOUS CONTINUOUS
Status: DISCONTINUED | OUTPATIENT
Start: 2017-02-17 | End: 2017-02-17 | Stop reason: HOSPADM

## 2017-02-17 RX ORDER — CEFAZOLIN SODIUM 2 G/100ML
2 INJECTION, SOLUTION INTRAVENOUS
Status: COMPLETED | OUTPATIENT
Start: 2017-02-17 | End: 2017-02-17

## 2017-02-17 RX ORDER — LIDOCAINE 40 MG/G
CREAM TOPICAL
Status: DISCONTINUED | OUTPATIENT
Start: 2017-02-17 | End: 2017-02-17 | Stop reason: HOSPADM

## 2017-02-17 RX ORDER — NALOXONE HYDROCHLORIDE 0.4 MG/ML
.1-.4 INJECTION, SOLUTION INTRAMUSCULAR; INTRAVENOUS; SUBCUTANEOUS
Status: DISCONTINUED | OUTPATIENT
Start: 2017-02-17 | End: 2017-02-17 | Stop reason: HOSPADM

## 2017-02-17 RX ORDER — LABETALOL HYDROCHLORIDE 5 MG/ML
10 INJECTION, SOLUTION INTRAVENOUS
Status: DISCONTINUED | OUTPATIENT
Start: 2017-02-17 | End: 2017-02-17 | Stop reason: HOSPADM

## 2017-02-17 RX ORDER — FENTANYL CITRATE 50 UG/ML
50 INJECTION, SOLUTION INTRAMUSCULAR; INTRAVENOUS EVERY 30 MIN PRN
Status: DISCONTINUED | OUTPATIENT
Start: 2017-02-17 | End: 2017-02-17 | Stop reason: HOSPADM

## 2017-02-17 RX ORDER — FENTANYL CITRATE 50 UG/ML
25-50 INJECTION, SOLUTION INTRAMUSCULAR; INTRAVENOUS
Status: DISCONTINUED | OUTPATIENT
Start: 2017-02-17 | End: 2017-02-17 | Stop reason: HOSPADM

## 2017-02-17 RX ORDER — ONDANSETRON 2 MG/ML
INJECTION INTRAMUSCULAR; INTRAVENOUS PRN
Status: DISCONTINUED | OUTPATIENT
Start: 2017-02-17 | End: 2017-02-17

## 2017-02-17 RX ADMIN — METHOHEXITAL SODIUM 30 MG: 500 INJECTION, POWDER, LYOPHILIZED, FOR SOLUTION INTRAMUSCULAR; INTRAVENOUS; RECTAL at 17:52

## 2017-02-17 RX ADMIN — LABETALOL HYDROCHLORIDE 5 MG: 5 INJECTION, SOLUTION INTRAVENOUS at 17:47

## 2017-02-17 RX ADMIN — FENTANYL CITRATE 50 MCG: 50 INJECTION, SOLUTION INTRAMUSCULAR; INTRAVENOUS at 17:18

## 2017-02-17 RX ADMIN — LABETALOL HYDROCHLORIDE 10 MG: 5 INJECTION, SOLUTION INTRAVENOUS at 17:42

## 2017-02-17 RX ADMIN — SODIUM CHLORIDE, POTASSIUM CHLORIDE, SODIUM LACTATE AND CALCIUM CHLORIDE: 600; 310; 30; 20 INJECTION, SOLUTION INTRAVENOUS at 17:25

## 2017-02-17 RX ADMIN — METHOHEXITAL SODIUM 30 MG: 500 INJECTION, POWDER, LYOPHILIZED, FOR SOLUTION INTRAMUSCULAR; INTRAVENOUS; RECTAL at 18:19

## 2017-02-17 RX ADMIN — ESMOLOL HYDROCHLORIDE 10 MG: 10 INJECTION, SOLUTION INTRAVENOUS at 18:07

## 2017-02-17 RX ADMIN — METHOHEXITAL SODIUM 40 MG: 500 INJECTION, POWDER, LYOPHILIZED, FOR SOLUTION INTRAMUSCULAR; INTRAVENOUS; RECTAL at 17:56

## 2017-02-17 RX ADMIN — CEFAZOLIN SODIUM 2 G: 2 INJECTION, SOLUTION INTRAVENOUS at 17:40

## 2017-02-17 RX ADMIN — METHOHEXITAL SODIUM 30 MG: 500 INJECTION, POWDER, LYOPHILIZED, FOR SOLUTION INTRAMUSCULAR; INTRAVENOUS; RECTAL at 18:05

## 2017-02-17 RX ADMIN — GLYCOPYRROLATE 0.2 MG: 0.2 INJECTION, SOLUTION INTRAMUSCULAR; INTRAVENOUS at 17:56

## 2017-02-17 RX ADMIN — DEXAMETHASONE SODIUM PHOSPHATE 10 MG: 10 INJECTION, SOLUTION INTRAMUSCULAR; INTRAVENOUS at 16:59

## 2017-02-17 RX ADMIN — METHOHEXITAL SODIUM 30 MG: 500 INJECTION, POWDER, LYOPHILIZED, FOR SOLUTION INTRAMUSCULAR; INTRAVENOUS; RECTAL at 18:12

## 2017-02-17 RX ADMIN — ONDANSETRON 4 MG: 2 INJECTION INTRAMUSCULAR; INTRAVENOUS at 17:40

## 2017-02-17 RX ADMIN — LABETALOL HYDROCHLORIDE 5 MG: 5 INJECTION, SOLUTION INTRAVENOUS at 17:52

## 2017-02-17 RX ADMIN — FENTANYL CITRATE 25 MCG: 50 INJECTION, SOLUTION INTRAMUSCULAR; INTRAVENOUS at 17:04

## 2017-02-17 ASSESSMENT — VISUAL ACUITY
OU: NORMAL ACUITY

## 2017-02-17 NOTE — IP AVS SNAPSHOT
Post Anesthesia Care Unit [address] [address]    Phone:  [phone]                                       After Visit Summary   2/17/2017    Olivia Rodriguez    MRN: [id number]           After Visit Summary Signature Page     I have received my discharge instructions, and my questions have been answered. I have discussed any challenges I see with this plan with the nurse or doctor.    ..........................................................................................................................................  Patient/Patient Representative Signature      ..........................................................................................................................................  Patient Representative Print Name and Relationship to Patient    ..................................................               ................................................  Date                                            Time    ..........................................................................................................................................  Reviewed by Signature/Title    ...................................................              ..............................................  Date                                                            Time

## 2017-02-17 NOTE — IP AVS SNAPSHOT
MRN:[id number]                      After Visit Summary   2/17/2017    Olivia Rodriguez    MRN: [id number]           Thank you!     Thank you for choosing [facility] for your care. Our goal is always to provide you with excellent care. Hearing back from our patients is one way we can continue to improve our services. Please take a few minutes to complete the written survey that you may receive in the mail after you visit with us. Thank you!        Patient Information     Date Of Birth          1945        About your hospital stay     You were admitted on:  February 17, 2017 You last received care in the:  Post Anesthesia Care Unit [facility]    You were discharged on:  February 17, 2017       Who to Call     For medical emergencies, please call 911.  For non-urgent questions about your medical care, please call your primary care provider or clinic, [phone]  For questions related to your surgery, please call your surgery clinic        Attending Provider     Provider Chapincito Louis MD Anesthesiology       Primary Care Provider Office Phone # Fax #    Ame GIANCARLO Villalobos [phone]       [facility]    [address]  [place]                [place] [address]        After Care Instructions     Diet Instructions       Resume pre-procedure diet            Discharge Instructions       Patient to follow up with Dr. Casper in neurosurgery clinic on an as needed basis. Call [phone] for any questions or concerns or to schedule an appointment.            Ice to affected area       Ice to operative site PRN                  Your next 10 appointments already scheduled     Mar 27, 2017 10:30 AM CDT   MA SCREENING BILATERAL W/ SIRISHA with SHBCMA6   [facility] ([facility])    [address] [place] [address], [address]  [facility] [address] [phone]           Do not use any powder, lotion or deodorant under your arms or on  your breast. If you do, we will ask you to remove it before your exam.  Wear comfortable, two-piece clothing.  If you have any allergies, tell your care team.  Bring any previous mammograms from other facilities or have them mailed to the breast center.              Further instructions from your care team         You underwent a Percutaneous rhizotomy.  Our neurosurgery department will be calling you within 1-3 days to discuss your trigeminal medications.   - If you have not heard from our clinic about your follow up visit by 3-4 days following your discharge, please call our clinic at [phone] to schedule an appointment with the Neurosurgery teams.   This surgery was done by Tong Casper MD       After discharge, your activity restrictions are:   -We encourage short frequent walks, increasing as tolerated.   - No driving until you are seen in clinic and cleared by your neurosurgeon.  If you have had a seizure, you may not drive for at least 3 months according to [place] law.     - No strenuous activity.   - No lifting more than 10 pounds until you are seen in clinic (a gallon of milk weighs approximately 8 pounds)     Wound cares after surgery   - You are ok to shower, but do not soak your incisions. Pat them dry if they get damp.   - Avoid coloring your hair or permanent styling until cleared by your surgeon   - No baths, hot tubs or pools for 4-6 weeks after surgery.       Call if you have any of the followin. Temperature greater than 101.5 F.   2. Any redness, swelling or discharge from the wound.   3. Any new weakness, numbness or altered mental status.   4. Worsening pain that is not improving with the pain medications you were prescribed.     Call [phone] or after 5:00 pm or on weekends call [phone] and ask for the neurosurgery resident on call. Thank You.  [facility], [place]  Same-Day Surgery   Adult Discharge Orders & Instructions     For 24 hours  after surgery    1. Get plenty of rest.  A responsible adult must stay with you for at least 24 hours after you leave the hospital.   2. Do not drive or use heavy equipment.  If you have weakness or tingling, don't drive or use heavy equipment until this feeling goes away.  3. Do not drink alcohol.  4. Avoid strenuous or risky activities.  Ask for help when climbing stairs.   5. You may feel lightheaded.  IF so, sit for a few minutes before standing.  Have someone help you get up.   6. If you have nausea (feel sick to your stomach): Drink only clear liquids such as apple juice, ginger ale, broth or 7-Up.  Rest may also help.  Be sure to drink enough fluids.  Move to a regular diet as you feel able.  7. You may have a slight fever. Call the doctor if your fever is over 100 F (37.7 C) (taken under the tongue) or lasts longer than 24 hours.  8. You may have a dry mouth, a sore throat, muscle aches or trouble sleeping.  These should go away after 24 hours.  9. Do not make important or legal decisions.   Call your doctor for any of the followin.  Signs of infection (fever, growing tenderness at the surgery site, a large amount of drainage or bleeding, severe pain, foul-smelling drainage, redness, swelling).    2. It has been over 8 to 10 hours since surgery and you are still not able to urinate (pass water).    3.  Headache for over 24 hours.    To contact a doctor, call Dr. Casper's neurosurgery office at [phone] or:      [phone] and ask for the resident on call for Neurosurgery (answered 24 hours a day)      Emergency Department:    [facility]: [phone]       (TTY for hearing impaired: [phone])        Pending Results     No orders found from 2/15/2017 to 2017.            Admission Information     Date & Time Provider Department Dept. Phone    2017 Chapincito Thurman MD Post Anesthesia Care Unit [facility] [phone]      Your Vitals Were     Blood Pressure  "Temperature Respirations Height Weight Last Period    154/89 98.5  F (36.9  C) (Oral) 18 1.626 m (5' 4\") 69.6 kg (153 lb 7 oz) 09/01/2000    Pulse Oximetry BMI (Body Mass Index)                95% 26.34 kg/m2          [employer] Information     [employer] gives you secure access to your electronic health record. If you see a primary care provider, you can also send messages to your care team and make appointments. If you have questions, please call your primary care clinic.  If you do not have a primary care provider, please call [phone] and they will assist you.        Care EveryWhere ID     This is your Care EveryWhere ID. This could be used by other organizations to access your [facility] medical records  [id number]           Review of your medicines      CONTINUE these medicines which may have CHANGED, or have new prescriptions. If we are uncertain of the size of tablets/capsules you have at home, strength may be listed as something that might have changed.        Dose / Directions    pravastatin 40 MG tablet   Commonly known as:  PRAVACHOL   This may have changed:  when to take this   Used for:  Hyperlipidemia LDL goal <100        Dose:  40 mg   Take 1 tablet (40 mg) by mouth daily   Quantity:  90 tablet   Refills:  3         CONTINUE these medicines which have NOT CHANGED        Dose / Directions    amLODIPine 2.5 MG tablet   Commonly known as:  NORVASC   Used for:  Essential hypertension        Dose:  2.5 mg   Take 1 tablet (2.5 mg) by mouth daily   Quantity:  90 tablet   Refills:  3       EFFEXOR  MG 24 hr capsule   Generic drug:  venlafaxine        Take by mouth daily (with breakfast)   Refills:  0       hydrochlorothiazide 25 MG tablet   Commonly known as:  HYDRODIURIL   Used for:  Essential hypertension        Dose:  25 mg   Take 1 tablet (25 mg) by mouth daily   Quantity:  90 tablet   Refills:  1       lisinopril 40 MG tablet   Commonly known as:  PRINIVIL/ZESTRIL   Used for:  Essential hypertension "        Dose:  40 mg   Take 1 tablet (40 mg) by mouth daily   Quantity:  90 tablet   Refills:  3       LORazepam 1 MG tablet   Commonly known as:  ATIVAN   Used for:  TMJ (temporomandibular joint syndrome)        Dose:  0.5 mg   Take 0.5 tablets (0.5 mg) by mouth nightly as needed for anxiety   Quantity:  30 tablet   Refills:  2       OXcarbazepine 300 MG tablet   Commonly known as:  TRILEPTAL        Dose:  300 mg   Take 1 tablet (300 mg) by mouth 2 times daily   Quantity:  60 tablet   Refills:  0       polyethylene glycol powder   Commonly known as:  MIRALAX   Used for:  Constipation        Dose:  1 capful   Take 17 g by mouth daily as needed   Quantity:  510 g   Refills:  1                Protect others around you: Learn how to safely use, store and throw away your medicines at www.disposemymeds.org.             Medication List: This is a list of all your medications and when to take them. Check marks below indicate your daily home schedule. Keep this list as a reference.      Medications           Morning Afternoon Evening Bedtime As Needed    amLODIPine 2.5 MG tablet   Commonly known as:  NORVASC   Take 1 tablet (2.5 mg) by mouth daily                                EFFEXOR  MG 24 hr capsule   Take by mouth daily (with breakfast)   Generic drug:  venlafaxine                                hydrochlorothiazide 25 MG tablet   Commonly known as:  HYDRODIURIL   Take 1 tablet (25 mg) by mouth daily                                lisinopril 40 MG tablet   Commonly known as:  PRINIVIL/ZESTRIL   Take 1 tablet (40 mg) by mouth daily                                LORazepam 1 MG tablet   Commonly known as:  ATIVAN   Take 0.5 tablets (0.5 mg) by mouth nightly as needed for anxiety                                OXcarbazepine 300 MG tablet   Commonly known as:  TRILEPTAL   Take 1 tablet (300 mg) by mouth 2 times daily                                polyethylene glycol powder   Commonly known as:  MIRALAX   Take 17 g by  mouth daily as needed                                pravastatin 40 MG tablet   Commonly known as:  PRAVACHOL   Take 1 tablet (40 mg) by mouth daily

## 2017-02-17 NOTE — PROGRESS NOTES
Pt to IR 1 accompanied by anesthesia staff.  Prepped and positioned per protocol.  Time out performed.  She will recover in the PACU.

## 2017-02-17 NOTE — ANESTHESIA PREPROCEDURE EVALUATION
Anesthesia Evaluation     . Pt has had prior anesthetic. Type: General and MAC      ROS/MED HX    ENT/Pulmonary:  - neg pulmonary ROS     Neurologic:     (+)other neuro trigeminal neuralgia    Cardiovascular:     (+) hypertension-Peripheral Vascular Disease---. : . . . :. .       METS/Exercise Tolerance:  >4 METS   Hematologic:         Musculoskeletal:  - neg musculoskeletal ROS       GI/Hepatic:  - neg GI/hepatic ROS       Renal/Genitourinary:  - ROS Renal section negative       Endo:  - neg endo ROS       Psychiatric:  - neg psychiatric ROS       Infectious Disease:  - neg infectious disease ROS       Malignancy:         Other:               Physical Exam  Normal systems: cardiovascular, pulmonary and dental    Airway   Mallampati: II  TM distance: >3 FB  Neck ROM: full    Dental     Cardiovascular       Pulmonary                     Anesthesia Plan      History & Physical Review  History and physical reviewed and following examination; no interval change.    ASA Status:  2 .    NPO Status:  > 8 hours    Plan for MAC with Propofol induction. Maintenance will be TIVA.  Reason for MAC:  Deep or markedly invasive procedure (G8)  PONV prophylaxis:  Ondansetron (or other 5HT-3) and Dexamethasone or Solumedrol       Postoperative Care  Postoperative pain management:  IV analgesics and Multi-modal analgesia.      Consents  Anesthetic plan, risks, benefits and alternatives discussed with:  Patient and Spouse..                          .

## 2017-02-18 NOTE — OR NURSING
Neurosurgery oncall paged #[phone]; pt's  had question regarding medication Oxcarbazepine; Per MD Leschke pt to continue taking as prescribed and follow up with clinic regarding tapering off medication. Pt's  verbalizes understanding of all d/c and follow up orders. No further concerns or questions.  Pt d/c home at this time.

## 2017-02-18 NOTE — ANESTHESIA POSTPROCEDURE EVALUATION
Patient: Olivia PEREZ Michael    Procedure(s):  Anesthesia Off Site Coverage Right Rhizotomy *Latex Allergy*    Diagnosis:Right Trigeminal Neuraglia   Diagnosis Additional Information: No value filed.    Anesthesia Type:  MAC    Note:  Anesthesia Post Evaluation    Patient location during evaluation: PACU and Bedside  Patient participation: Able to fully participate in evaluation  Level of consciousness: awake and alert  Pain management: adequate  Airway patency: patent  Cardiovascular status: acceptable  Respiratory status: acceptable  Hydration status: acceptable  PONV: none     Anesthetic complications: None          Last vitals:  Vitals:    02/17/17 1311 02/17/17 1840   BP: (!) 166/106 135/86   Resp: 16 18   Temp: 36.8  C (98.3  F) 36.9  C (98.4  F)   SpO2: 97% 95%         Electronically Signed By: Nette Hernandez MD  February 17, 2017  7:01 PM

## 2017-02-18 NOTE — DISCHARGE INSTRUCTIONS
You underwent a Percutaneous rhizotomy.  Our neurosurgery department will be calling you within 1-3 days to discuss your trigeminal medications.   - If you have not heard from our clinic about your follow up visit by 3-4 days following your discharge, please call our clinic at [phone] to schedule an appointment with the Neurosurgery teams.   This surgery was done by Tong Casper MD       After discharge, your activity restrictions are:   -We encourage short frequent walks, increasing as tolerated.   - No driving until you are seen in clinic and cleared by your neurosurgeon.  If you have had a seizure, you may not drive for at least 3 months according to [place] law.     - No strenuous activity.   - No lifting more than 10 pounds until you are seen in clinic (a gallon of milk weighs approximately 8 pounds)     Wound cares after surgery   - You are ok to shower, but do not soak your incisions. Pat them dry if they get damp.   - Avoid coloring your hair or permanent styling until cleared by your surgeon   - No baths, hot tubs or pools for 4-6 weeks after surgery.       Call if you have any of the followin. Temperature greater than 101.5 F.   2. Any redness, swelling or discharge from the wound.   3. Any new weakness, numbness or altered mental status.   4. Worsening pain that is not improving with the pain medications you were prescribed.     Call [phone] or after 5:00 pm or on weekends call [phone] and ask for the neurosurgery resident on call. Thank You.  [facility], [place]  Same-Day Surgery   Adult Discharge Orders & Instructions     For 24 hours after surgery    1. Get plenty of rest.  A responsible adult must stay with you for at least 24 hours after you leave the hospital.   2. Do not drive or use heavy equipment.  If you have weakness or tingling, don't drive or use heavy equipment until this feeling goes away.  3. Do not drink alcohol.  4. Avoid  strenuous or risky activities.  Ask for help when climbing stairs.   5. You may feel lightheaded.  IF so, sit for a few minutes before standing.  Have someone help you get up.   6. If you have nausea (feel sick to your stomach): Drink only clear liquids such as apple juice, ginger ale, broth or 7-Up.  Rest may also help.  Be sure to drink enough fluids.  Move to a regular diet as you feel able.  7. You may have a slight fever. Call the doctor if your fever is over 100 F (37.7 C) (taken under the tongue) or lasts longer than 24 hours.  8. You may have a dry mouth, a sore throat, muscle aches or trouble sleeping.  These should go away after 24 hours.  9. Do not make important or legal decisions.   Call your doctor for any of the followin.  Signs of infection (fever, growing tenderness at the surgery site, a large amount of drainage or bleeding, severe pain, foul-smelling drainage, redness, swelling).    2. It has been over 8 to 10 hours since surgery and you are still not able to urinate (pass water).    3.  Headache for over 24 hours.    To contact a doctor, call Dr. Casper's neurosurgery office at [phone] or:      [phone] and ask for the resident on call for Neurosurgery (answered 24 hours a day)      Emergency Department:    [facility]: [phone]       (TTY for hearing impaired: [phone])

## 2017-02-18 NOTE — OR NURSING
Dr. Casper at bedside in PACU; no concerns noted. He is aware of decreased sensation on R side of pt's face.  Pt safe to d/c home.

## 2017-02-18 NOTE — OP NOTE
DATE OF PROCEDURE:  02/27/2017.      SURGEON:  Tong Casper MD      :  Suhail Theodore MD      PREOPERATIVE DIAGNOSIS:  Trigeminal neuralgia involving the right V3 distribution.      POSTOPERATIVE DIAGNOSIS:  Trigeminal neuralgia involving the right V3 distribution.      PROCEDURES PERFORMED:  Right-sided percutaneous stereotactic radiofrequency rhizotomy.      ANESTHESIA:  Local MAC.      ESTIMATED BLOOD LOSS:  Less than 1 mL.      FINDINGS:  An 80% reduction in pinprick sensation in the right V3 at the end of the procedure, no evidence of trigeminal pain.      COMPLICATIONS:  None immediately apparent.      PREOPERATIVE HISTORY:  Olivia Rodriguez is a 72-year-old woman with a history of trigeminal neuralgia on the right side of her face.  She has had numerous peripheral percutaneous rhizotomies done by a local pain doctor with some effect, but ultimately would have a recurrence very quickly after.  Most recently she has been in extreme pain that has not been easily treated with medication and she came to me seeking alternatives to surgical approach.  We did discuss different treatment options including microvascular decompression versus percutaneous radiofrequency rhizotomy.  Ultimately, she wanted to proceed with a radiofrequency rhizotomy.  She understood the risks and benefits including the expected numbness that would result from this which is expected to be lifelong.      PROCEDURE DETAILS:  I saw Olivia in the preoperative area and we went over the risks and benefits, she signed informed written consent.  She was then brought down to the angiography suite where she was positioned on the angiography table.  Hartel's landmarks were marked on the right side of her face and then her face was prepped and draped in the usual sterile fashion.      We lined up our lateral fluoroscopy tube for a pure lateral projection.  We then put her to sleep with Brevital.  While she was asleep, I then  percutaneously accessed the right foramen ovale without any difficulty.  I placed my needle in line with the clivus and then switched out the stylet for the Anjali electrode.  We did see CSF egress from our needle.  She then was awoken and then we stimulated and were able to reproduce pain at point 2.  We therefore put her back to sleep given that this pain was in the appropriate distribution.  We then made a lesion at 65 degrees for 90 seconds.  We then woke her back up and we tested pinprick sensation and there was approximately 50% reduction in pinprick in the right V2 and V3 distribution.  I then repositioned my electrode turning it lateral to catch more of the V3 distribution.  I stimulated again and was able to reproduce pain in the V3 distribution.  We therefore put her back asleep with Brevital and made a second lesion at 70 degrees for 90 seconds.  When she awoke, we then tested pinprick sensation and found that there was an 80% reduction in pinprick in the right V3 distribution.  We found this to be satisfactory and decided to stop the procedure at this point.  We put her back to sleep 1 more time with Brevital and removed our needle and stylus and then held pressure for 3 minutes on her cheek.      I attest that I was present for the entire duration of the procedure.         SHERRY GUERRERO MD             D: 2017 18:41   T: 2017 19:00   MT: david      Name:     NATALIE ZHOU   MRN:      [id number]        Account:        [id number]   :      1945           Procedure Date: 2017      Document: [id number]

## 2017-02-18 NOTE — ANESTHESIA CARE TRANSFER NOTE
Patient: Olivia PEREZ Michael    Procedure(s):  Anesthesia Off Site Coverage Right Rhizotomy *Latex Allergy*    Diagnosis: Right Trigeminal Neuraglia   Diagnosis Additional Information: No value filed.    Anesthesia Type:   MAC     Note:  Airway :Room Air  Patient transferred to:PACU  Comments: Anesthesia Care Transfer Note    Patient: Olivia PEREZ Michael    Transferred to: PACU    Patient vital signs: stable    Airway: none    Monitors on, VSS, pt. Stable, Report given to PACU RN.     Len Roldan CRNA  2/17/2017 6:40 PM            Vitals: (Last set prior to Anesthesia Care Transfer)    CRNA VITALS  2/17/2017 1756 - 2/17/2017 1840      2/17/2017             Pulse: 87    Ht Rate: 85    Temp: 36.7  C (98.1  F)    SpO2: 97 %                Electronically Signed By: CARY Ramirez CRNA  February 17, 2017  6:40 PM

## 2017-02-20 ENCOUNTER — TELEPHONE (OUTPATIENT)
Dept: NEUROSURGERY | Facility: CLINIC | Age: 72
End: 2017-02-20

## 2017-02-20 ENCOUNTER — MYC MEDICAL ADVICE (OUTPATIENT)
Dept: FAMILY MEDICINE | Facility: CLINIC | Age: 72
End: 2017-02-20

## 2017-02-20 NOTE — TELEPHONE ENCOUNTER
For Dr. Villalobos only, no action needed.    To PCP,  Please see patient message.  Carolina Calle RN

## 2017-02-20 NOTE — PROGRESS NOTES
HISTORY OF PRESENT ILLNESS:  Ms. Rodriguez is a 72-year-old female seen for followup after her trigeminal neuralgia attack 2017 in the emergency room.  Since that time, we had recommended starting and titrating up on Trileptal.  She has done this and notes that she has had increasing somnolence, but has noted a slight decrease in terms of the severe pain that she was experiencing.  The patient is fatigued and very emotional during her clinic visit.  The types of surgical interventions were discussed with the patient, as well as a recommendation for a percutaneous radiofrequency rhizotomy.  The patient and her  decided to think about proceeding with surgery.  The number for the clinic was given to them and we will wait to hear from them.      DICTATED BY:  Jorge Marie M.D., Resident         SHERRY GUERRERO MD       As dictated by JORGE MARIE MD            D: 2017 19:39   T: 2017 12:05   MT: NICOLE      Name:     NATALIE RODRIGUEZ   MRN:      [id number]        Account:      [id number]   :      1945           Service Date: 2017      Document: [id number]

## 2017-02-20 NOTE — TELEPHONE ENCOUNTER
Neurosurgery Discharge Coordination Note       Responsible Attending physician: Tong Casper MD    Operation performed: Right side radiofrequency rhizotomy    Date of Discharge: 2/17/17    Discharge to: Home     Current status: Patient states she feels good and is pain free.  Denies redness, swelling,increased tenderness or elevated temp. Reports Incision CDI without signs of infection. Denies current bowel or bladder issues.    She is aware of how to wean oxcarbazepine.  She will decrease to a bedtime dose tonight and discontinue in 3 days.      Discharge instructions and medications reviewed with patient.  Follow up appointments/imaging/testes needed:     RN triage/on call number given: [phone]/ [phone]

## 2017-03-03 ENCOUNTER — OFFICE VISIT (OUTPATIENT)
Dept: FAMILY MEDICINE | Facility: CLINIC | Age: 72
End: 2017-03-03
Payer: COMMERCIAL

## 2017-03-03 VITALS
RESPIRATION RATE: 16 BRPM | TEMPERATURE: 96.8 F | SYSTOLIC BLOOD PRESSURE: 108 MMHG | WEIGHT: 147 LBS | HEIGHT: 64 IN | BODY MASS INDEX: 25.1 KG/M2 | HEART RATE: 104 BPM | DIASTOLIC BLOOD PRESSURE: 65 MMHG | OXYGEN SATURATION: 98 %

## 2017-03-03 DIAGNOSIS — G50.0 TRIGEMINAL NEURALGIA: ICD-10-CM

## 2017-03-03 DIAGNOSIS — Z96.653 STATUS POST TOTAL BILATERAL KNEE REPLACEMENT: Primary | ICD-10-CM

## 2017-03-03 DIAGNOSIS — R41.3 COMPLAINTS OF MEMORY DISTURBANCE: ICD-10-CM

## 2017-03-03 DIAGNOSIS — R63.4 WEIGHT LOSS: ICD-10-CM

## 2017-03-03 DIAGNOSIS — Z13.0 SCREENING FOR DEFICIENCY ANEMIA: ICD-10-CM

## 2017-03-03 DIAGNOSIS — Z13.29 SCREENING FOR THYROID DISORDER: ICD-10-CM

## 2017-03-03 LAB
ERYTHROCYTE [DISTWIDTH] IN BLOOD BY AUTOMATED COUNT: 13.8 % (ref 10–15)
HCT VFR BLD AUTO: 41.9 % (ref 35–47)
HGB BLD-MCNC: 13.9 G/DL (ref 11.7–15.7)
MCH RBC QN AUTO: 30 PG (ref 26.5–33)
MCHC RBC AUTO-ENTMCNC: 33.2 G/DL (ref 31.5–36.5)
MCV RBC AUTO: 90 FL (ref 78–100)
PLATELET # BLD AUTO: 376 10E9/L (ref 150–450)
RBC # BLD AUTO: 4.64 10E12/L (ref 3.8–5.2)
VIT B12 SERPL-MCNC: 930 PG/ML (ref 193–986)
WBC # BLD AUTO: 6.3 10E9/L (ref 4–11)

## 2017-03-03 PROCEDURE — 82607 VITAMIN B-12: CPT | Performed by: INTERNAL MEDICINE

## 2017-03-03 PROCEDURE — 85027 COMPLETE CBC AUTOMATED: CPT | Performed by: INTERNAL MEDICINE

## 2017-03-03 PROCEDURE — 99214 OFFICE O/P EST MOD 30 MIN: CPT | Performed by: INTERNAL MEDICINE

## 2017-03-03 PROCEDURE — 36415 COLL VENOUS BLD VENIPUNCTURE: CPT | Performed by: INTERNAL MEDICINE

## 2017-03-03 PROCEDURE — 84443 ASSAY THYROID STIM HORMONE: CPT | Performed by: INTERNAL MEDICINE

## 2017-03-03 PROCEDURE — 80053 COMPREHEN METABOLIC PANEL: CPT | Performed by: INTERNAL MEDICINE

## 2017-03-03 RX ORDER — AMOXICILLIN 500 MG/1
2000 CAPSULE ORAL ONCE
Qty: 4 CAPSULE | Refills: 3 | Status: SHIPPED | OUTPATIENT
Start: 2017-03-03 | End: 2017-03-03

## 2017-03-03 NOTE — PATIENT INSTRUCTIONS
Take 4 tablets of amoxicillin one hour prior to the procedure.   I provided refills in case you undergo another procedure.   You were referred to see Dr. Ledezma. She runs a memory clinic.   Follow up in 4 months  Seek sooner medical attention if there is any worsening of symptoms or problems.

## 2017-03-03 NOTE — NURSING NOTE
"Chief Complaint   Patient presents with     Medication Request     abx for dental work       Initial /65 (BP Location: Left arm, Patient Position: Left side, Cuff Size: Adult Regular)  Pulse 104  Temp 96.8  F (36  C) (Oral)  Resp 16  Ht 5' 4\" (1.626 m)  Wt 147 lb (66.7 kg)  LMP 09/01/2000  SpO2 98%  BMI 25.23 kg/m2 Estimated body mass index is 25.23 kg/(m^2) as calculated from the following:    Height as of this encounter: 5' 4\" (1.626 m).    Weight as of this encounter: 147 lb (66.7 kg).  Medication Reconciliation: complete   Ashwini Manning CMA (AAMA)      "

## 2017-03-03 NOTE — MR AVS SNAPSHOT
After Visit Summary   3/3/2017    Olviia Rodriguez    MRN: [id number]           Patient Information     Date Of Birth          1945        Visit Information        Provider Department      3/3/2017 11:00 AM Ame Villalobos MD [facility]        Today's Diagnoses     Status post total bilateral knee replacement    -  1    Trigeminal neuralgia        Complaints of memory disturbance        Screening for deficiency anemia        Weight loss        Screening for thyroid disorder          Care Instructions    Take 4 tablets of amoxicillin one hour prior to the procedure.   I provided refills in case you undergo another procedure.   You were referred to see Dr. Ledezma. She runs a memory clinic.   Follow up in 4 months  Seek sooner medical attention if there is any worsening of symptoms or problems.          Follow-ups after your visit        Additional Services     MEMORY CLINIC REFERRAL       Your provider has referred you to: [facility]   [phone]    Please answer the following questions to ensure a successful referral:    Has the patient been diagnosed with any positive neuro-cognitive impairment? No    Primary objective or goal of consultation (please provide some detail):  New symptoms  and Patient/family education evaluation    What is the name of the person that should be contacted to schedule the appt?  Alejandro [phone]     What is the relationship to the patient?     What is the best number to reach them at?  [phone]     The patient/family will be contacted within 1-2 business days to schedule your appointment. If you haven't heard from us within that timeframe, please call the number above.     Please be aware that coverage of these services is subject to the terms and limitations of your health insurance plan.  Call member services at your health plan with any benefit or coverage questions.      Please bring the following to your  appointment:  >>   Any x-rays, CTs or MRIs which have been performed.  Contact the facility where they were done to arrange for  prior to your scheduled appointment.  Any new CT, MRI or other procedures ordered by your specialist must be performed at a [facility] facility or coordinated by your clinic's referral office.    >>   List of current medications   >>   This referral request   >>   Any documents/labs given to you for this referral                  Your next 10 appointments already scheduled     Mar 27, 2017 10:30 AM CDT   MA SCREENING BILATERAL W/ SIRISHA with [id number]   [facility] ([facility])    [address] [place] [address], [address]  [facility] [address] [phone]           Do not use any powder, lotion or deodorant under your arms or on your breast. If you do, we will ask you to remove it before your exam.  Wear comfortable, two-piece clothing.  If you have any allergies, tell your care team.  Bring any previous mammograms from other facilities or have them mailed to the breast center.              Who to contact     If you have questions or need follow up information about today's clinic visit or your schedule please contact [facility] directly at [phone].  Normal or non-critical lab and imaging results will be communicated to you by MyChart, letter or phone within 4 business days after the clinic has received the results. If you do not hear from us within 7 days, please contact the clinic through [employer] or phone. If you have a critical or abnormal lab result, we will notify you by phone as soon as possible.  Submit refill requests through [employer] or call your pharmacy and they will forward the refill request to us. Please allow 3 business days for your refill to be completed.          Additional Information About Your Visit        [employer] Information     [employer] gives you secure access to your electronic health record. If you see a primary  "care provider, you can also send messages to your care team and make appointments. If you have questions, please call your primary care clinic.  If you do not have a primary care provider, please call [phone] and they will assist you.        Care EveryWhere ID     This is your Care EveryWhere ID. This could be used by other organizations to access your [facility] medical records  [id number]        Your Vitals Were     Pulse Temperature Respirations Height Last Period Pulse Oximetry    104 96.8  F (36  C) (Oral) 16 5' 4\" (1.626 m) 09/01/2000 98%    BMI (Body Mass Index)                   25.23 kg/m2            Blood Pressure from Last 3 Encounters:   03/03/17 108/65   02/17/17 (!) 159/94   02/14/17 145/79    Weight from Last 3 Encounters:   03/03/17 147 lb (66.7 kg)   02/17/17 153 lb 7 oz (69.6 kg)   02/14/17 153 lb (69.4 kg)              We Performed the Following     CBC with platelets     Comprehensive metabolic panel     MEMORY CLINIC REFERRAL     TSH with free T4 reflex     Vitamin B12          Today's Medication Changes          These changes are accurate as of: 3/3/17 11:42 AM.  If you have any questions, ask your nurse or doctor.               Start taking these medicines.        Dose/Directions    amoxicillin 500 MG capsule   Commonly known as:  AMOXIL   Used for:  Status post total bilateral knee replacement   Started by:  Ame Villalobos MD        Dose:  2000 mg   Take 4 capsules (2,000 mg) by mouth once for 1 dose One hour before procedure   Quantity:  4 capsule   Refills:  3         These medicines have changed or have updated prescriptions.        Dose/Directions    pravastatin 40 MG tablet   Commonly known as:  PRAVACHOL   This may have changed:  when to take this   Used for:  Hyperlipidemia LDL goal <100        Dose:  40 mg   Take 1 tablet (40 mg) by mouth daily   Quantity:  90 tablet   Refills:  3            Where to get your medicines      These medications were sent to [id number] - " [place], [place] - [address]  [address] [place] [address]     Phone:  [phone]     amoxicillin 500 MG capsule                Primary Care Provider Office Phone # Fax #    Ame Villalobos [phone]       [facility]    [address]  [facility] [address]        Thank you!     Thank you for choosing [facility]  for your care. Our goal is always to provide you with excellent care. Hearing back from our patients is one way we can continue to improve our services. Please take a few minutes to complete the written survey that you may receive in the mail after your visit with us. Thank you!             Your Updated Medication List - Protect others around you: Learn how to safely use, store and throw away your medicines at www.disposemymeds.org.          This list is accurate as of: 3/3/17 11:42 AM.  Always use your most recent med list.                   Brand Name Dispense Instructions for use    amLODIPine 2.5 MG tablet    NORVASC    90 tablet    Take 1 tablet (2.5 mg) by mouth daily       amoxicillin 500 MG capsule    AMOXIL    4 capsule    Take 4 capsules (2,000 mg) by mouth once for 1 dose One hour before procedure       EFFEXOR  MG 24 hr capsule   Generic drug:  venlafaxine      Take by mouth daily (with breakfast)       hydrochlorothiazide 25 MG tablet    HYDRODIURIL    90 tablet    Take 1 tablet (25 mg) by mouth daily       lisinopril 40 MG tablet    PRINIVIL/ZESTRIL    90 tablet    Take 1 tablet (40 mg) by mouth daily       polyethylene glycol powder    MIRALAX    510 g    Take 17 g by mouth daily as needed       pravastatin 40 MG tablet    PRAVACHOL    90 tablet    Take 1 tablet (40 mg) by mouth daily

## 2017-03-03 NOTE — PROGRESS NOTES
SUBJECTIVE:                                                    Olivia Rodriguez is a 72 year old female who presents to clinic today for the following health issues:    Chief Complaint   Patient presents with     Medication Request     abx for dental work     Patient is accompanied by her , Alejandro Rodriguez.     S/p percutaneous rhizotomy and bilateral knee arthoplasty  Recently completed a full dental exam but was noted to have dental caries.   Dentist is unable to fill caries until PCP provides an antibiotic or sends a letter indicating an antibiotic is not necessary.     Patient was diagnosed with mild cognitive impairment 1.5 years ago.   S/p percutaneous rhizotomy and bilateral knee arthoplasty.   Discontinued carbamazepine due to increased memory concerns and confusion.   She recently completed a brain MRI on 2/7/17.   She has discontinued lorazepam since her procedure.   Continues to take Effexor.     Alejandro expresses concern regarding patient's 28 lb weight loss over the past 1.5 years.   She states she felt nauseous and had a loss of appetite.   She was drinking Ensure during this time to keep her caloric count up.   Both her nausea and decreased appetite have resolved.   She is eating normally now and hopes to gain weight again.     Problem list and histories reviewed & adjusted, as indicated.  Additional history: as documented    Patient Active Problem List   Diagnosis     Major depressive disorder, recurrent episode, moderate (H)     TMJ (temporomandibular joint syndrome)     Hyperlipidemia LDL goal <130     S/P total knee arthroplasty     S/P HEATHER-BSO (total abdominal hysterectomy and bilateral salpingo-oophorectomy)     Sensation of feeling cold     Health Care Home     Anxiety     Invasive ductal carcinoma of breast (H)     Memory difficulty     Controlled substance agreement signed     Essential hypertension     Coronary artery calcification     Hyperlipidemia LDL goal <100     Lung nodule      Malignant neoplasm of upper-outer quadrant of right female breast (H)     Trigeminal neuralgia     Status post total bilateral knee replacement     Past Surgical History   Procedure Laterality Date     Orthopedic surgery       Right TKA and Left TKA      section       Colonoscopy       She had colonoscopy and endoscopy done on November 15, 2012 for workup of iron deficiency anemia and the results were satisfactory     Mastectomy partial with sentinel node  2013     Procedure: MASTECTOMY PARTIAL WITH SENTINEL NODE;  RIGHT PARTIAL MASTECTOMY WITH RIGHT AXILLARY SENTINEL NODE BIOPSY;  Surgeon: Kae Padilla MD;  Location: [facility]     Biopsy  13     right breast biopsy     Biopsy  13     right axillary lymph node     Breast surgery  13     right breast partial mastectomy/lumpectomy     Gyn surgery            Hysterectomy total abdominal, bilateral salpingo-oophorectomy, combined         Social History   Substance Use Topics     Smoking status: Never Smoker     Smokeless tobacco: Never Used     Alcohol use 0.6 oz/week     1 Standard drinks or equivalent per week      Comment: 2-3 drinks wine per week     Family History   Problem Relation Age of Onset     Cardiovascular Father      Alzheimer Disease Mother      Cancer - colorectal No family hx of      Breast Cancer No family hx of          Current Outpatient Prescriptions   Medication Sig Dispense Refill     amoxicillin (AMOXIL) 500 MG capsule Take 4 capsules (2,000 mg) by mouth once for 1 dose One hour before procedure 4 capsule 3     pravastatin (PRAVACHOL) 40 MG tablet Take 1 tablet (40 mg) by mouth daily (Patient taking differently: Take 40 mg by mouth At Bedtime ) 90 tablet 3     lisinopril (PRINIVIL/ZESTRIL) 40 MG tablet Take 1 tablet (40 mg) by mouth daily 90 tablet 3     amLODIPine (NORVASC) 2.5 MG tablet Take 1 tablet (2.5 mg) by mouth daily 90 tablet 3     LORazepam (ATIVAN) 1 MG tablet Take 0.5 tablets (0.5 mg) by mouth  "nightly as needed for anxiety 30 tablet 2     hydrochlorothiazide (HYDRODIURIL) 25 MG tablet Take 1 tablet (25 mg) by mouth daily 90 tablet 1     polyethylene glycol (MIRALAX) powder Take 17 g by mouth daily as needed 510 g 1     venlafaxine (EFFEXOR XR) 150 MG 24 hr capsule Take by mouth daily (with breakfast)        Allergies   Allergen Reactions     Seafood Anaphylaxis     Femara [Letrozole] Rash     Latex Rash     WITH LATEX BANDAIDS     Tamoxifen Rash       ROS:  Constitutional, HEENT, cardiovascular, pulmonary, GI, , musculoskeletal, neuro, skin, endocrine and psych systems are negative, except as otherwise noted.    This document serves as a record of the services and decisions personally performed and made by Ame Villalobos MD. It was created on his/her behalf by Colleen Cárdenas, a trained medical scribe. The creation of this document is based the provider's statements to the medical scribe.  Scribe Colleen Cárdenas 11:22 AM, March 3, 2017    OBJECTIVE:                                                    /65 (BP Location: Left arm, Patient Position: Left side, Cuff Size: Adult Regular)  Pulse 104  Temp 96.8  F (36  C) (Oral)  Resp 16  Ht 5' 4\" (1.626 m)  Wt 147 lb (66.7 kg)  LMP 09/01/2000  SpO2 98%  BMI 25.23 kg/m2  Body mass index is 25.23 kg/(m^2).  GENERAL: healthy, alert and no distress    Diagnostic Test Results:  Results for orders placed or performed in visit on 03/03/17 (from the past 24 hour(s))   CBC with platelets   Result Value Ref Range    WBC 6.3 4.0 - 11.0 10e9/L    RBC Count 4.64 3.8 - 5.2 10e12/L    Hemoglobin 13.9 11.7 - 15.7 g/dL    Hematocrit 41.9 35.0 - 47.0 %    MCV 90 78 - 100 fl    MCH 30.0 26.5 - 33.0 pg    MCHC 33.2 31.5 - 36.5 g/dL    RDW 13.8 10.0 - 15.0 %    Platelet Count 376 150 - 450 10e9/L     Pending CMP, TSH, and Vitamin B12 results.      ASSESSMENT/PLAN:                                                    Olivia was seen today for medication request.    Diagnoses " and all orders for this visit:    Status post total bilateral knee replacement  Antibiotic prophylaxis indicated by history of bilateral total knee replacement.   Antibiotic provided for upcoming dental caries filling procedure.   -     amoxicillin (AMOXIL) 500 MG capsule; Take 4 capsules (2,000 mg) by mouth once for 1 dose One hour before procedure    Trigeminal neuralgia  Improved s/p percutaneous rhizotomy.     Complaints of memory disturbance  Mild cognitive impairment dx 1.5 years ago.   Decrease in memory since carbamazepine use - has since discontinued.   It appears like that her memory loss is slowly progressing.  Referral to Dr. Ledezma's memory clinic placed.   -     MEMORY CLINIC REFERRAL  -     TSH with free T4 reflex    Screening for deficiency anemia  -     Vitamin B12    Weight loss  Weight loss of 28 lbs over the past 1.5 years attributed to loss of appetite and nausea due to severe trigeminal neuralgia pain has since resolve.   Due to trigeminal neuralgia pain patient 's intake was poor but now problem has resolved after surgery pain has improved  Appetite back at baseline.   No longer drinking ensure.   Will continue to monitor.   -     TSH with free T4 reflex  -     Comprehensive metabolic panel  -     CBC with platelets  Work up to make sure there  Is no other reason for weight loss    Screening for thyroid disorder  -     TSH with free T4 reflex          Patient Instructions   Take 4 tablets of amoxicillin one hour prior to the procedure.   I provided refills in case you undergo another procedure.   You were referred to see Dr. Ledezma. She runs a memory clinic.   Follow up in 4 months  Seek sooner medical attention if there is any worsening of symptoms or problems.      The information in this document, created by the medical scribe for me, accurately reflects the services I personally performed and the decisions made by me. I have reviewed and approved this document for accuracy prior to leaving  the patient care area.  Ame Villalobos MD  11:22 AM, 03/03/17    Ame Villalobos MD  [facility]

## 2017-03-04 LAB
ALBUMIN SERPL-MCNC: 3.9 G/DL (ref 3.4–5)
ALP SERPL-CCNC: 77 U/L (ref 40–150)
ALT SERPL W P-5'-P-CCNC: 41 U/L (ref 0–50)
ANION GAP SERPL CALCULATED.3IONS-SCNC: 11 MMOL/L (ref 3–14)
AST SERPL W P-5'-P-CCNC: 20 U/L (ref 0–45)
BILIRUB SERPL-MCNC: 0.3 MG/DL (ref 0.2–1.3)
BUN SERPL-MCNC: 21 MG/DL (ref 7–30)
CALCIUM SERPL-MCNC: 9.5 MG/DL (ref 8.5–10.1)
CHLORIDE SERPL-SCNC: 100 MMOL/L (ref 94–109)
CO2 SERPL-SCNC: 29 MMOL/L (ref 20–32)
CREAT SERPL-MCNC: 0.83 MG/DL (ref 0.52–1.04)
GFR SERPL CREATININE-BSD FRML MDRD: 67 ML/MIN/1.7M2
GLUCOSE SERPL-MCNC: 105 MG/DL (ref 70–99)
POTASSIUM SERPL-SCNC: 4.2 MMOL/L (ref 3.4–5.3)
PROT SERPL-MCNC: 7.4 G/DL (ref 6.8–8.8)
SODIUM SERPL-SCNC: 140 MMOL/L (ref 133–144)
TSH SERPL DL<=0.005 MIU/L-ACNC: 0.72 MU/L (ref 0.4–4)

## 2017-03-06 NOTE — PROGRESS NOTES
This is to inform you regarding your test result.    TSH which is thyroid hormone is normal.  The testing of your kidney function, liver function and electrolytes was satisfactory   Vitamin B 12 level is normal.  CBC result which includes white count Hemoglobin and  Platelet Counts is normal.       Dr.Nasima Wilfredo MD,FACP

## 2017-03-27 ENCOUNTER — OFFICE VISIT (OUTPATIENT)
Dept: FAMILY MEDICINE | Facility: CLINIC | Age: 72
End: 2017-03-27
Payer: COMMERCIAL

## 2017-03-27 ENCOUNTER — HOSPITAL ENCOUNTER (OUTPATIENT)
Dept: MAMMOGRAPHY | Facility: CLINIC | Age: 72
Discharge: HOME | End: 2017-03-27
Attending: INTERNAL MEDICINE | Admitting: INTERNAL MEDICINE
Payer: COMMERCIAL

## 2017-03-27 VITALS
HEART RATE: 97 BPM | BODY MASS INDEX: 26.29 KG/M2 | SYSTOLIC BLOOD PRESSURE: 132 MMHG | OXYGEN SATURATION: 99 % | WEIGHT: 154 LBS | DIASTOLIC BLOOD PRESSURE: 74 MMHG | HEIGHT: 64 IN

## 2017-03-27 DIAGNOSIS — G50.0 TRIGEMINAL NEURALGIA: ICD-10-CM

## 2017-03-27 DIAGNOSIS — K43.9 VENTRAL HERNIA WITHOUT OBSTRUCTION OR GANGRENE: Primary | ICD-10-CM

## 2017-03-27 DIAGNOSIS — Z12.31 VISIT FOR SCREENING MAMMOGRAM: ICD-10-CM

## 2017-03-27 DIAGNOSIS — I10 ESSENTIAL HYPERTENSION: ICD-10-CM

## 2017-03-27 PROCEDURE — 99214 OFFICE O/P EST MOD 30 MIN: CPT | Performed by: INTERNAL MEDICINE

## 2017-03-27 PROCEDURE — G0202 SCR MAMMO BI INCL CAD: HCPCS

## 2017-03-27 PROCEDURE — 77063 BREAST TOMOSYNTHESIS BI: CPT

## 2017-03-27 NOTE — NURSING NOTE
"Chief Complaint   Patient presents with     Hernia       Initial /74 (BP Location: Left arm, Patient Position: Chair, Cuff Size: Adult Large)  Pulse 97  Ht 5' 4\" (1.626 m)  Wt 154 lb (69.9 kg)  LMP 09/01/2000  SpO2 99%  Breastfeeding? No  BMI 26.43 kg/m2 Estimated body mass index is 26.43 kg/(m^2) as calculated from the following:    Height as of this encounter: 5' 4\" (1.626 m).    Weight as of this encounter: 154 lb (69.9 kg).  Medication Reconciliation: complete  "

## 2017-03-27 NOTE — PROGRESS NOTES
SUBJECTIVE:                                                    Olivia Rodriguez is a 72 year old female who presents to clinic today for the following health issues:  She is accompanied by her . Her history is limited to due to memory difficulty but her  is able to help.    Chief Complaint   Patient presents with     Hernia     Patient is concerned about a hernia  She has been aware of a bulge in her groin for a long time  Recently it became a little more uncomfortable than it has been and is painful when it is pushed  She is unsure if it is reducible   13 and 14 abdominal CT findings were: Left-sided ventral hernia identified which is filled with fat. There is a loop of sigmoid colon which extends immediately to the level of the hernia but does not extend through the hernia      Problem list and histories reviewed & adjusted, as indicated.  Additional history: as documented    Patient Active Problem List   Diagnosis     Major depressive disorder, recurrent episode, moderate (H)     TMJ (temporomandibular joint syndrome)     Hyperlipidemia LDL goal <130     S/P total knee arthroplasty     S/P HEATHER-BSO (total abdominal hysterectomy and bilateral salpingo-oophorectomy)     Sensation of feeling cold     Health Care Home     Anxiety     Invasive ductal carcinoma of breast (H)     Memory difficulty     Controlled substance agreement signed     Essential hypertension     Coronary artery calcification     Hyperlipidemia LDL goal <100     Lung nodule     Malignant neoplasm of upper-outer quadrant of right female breast (H)     Trigeminal neuralgia     Status post total bilateral knee replacement     Past Surgical History:   Procedure Laterality Date     BIOPSY  13    right breast biopsy     BIOPSY  13    right axillary lymph node     BREAST SURGERY  13    right breast partial mastectomy/lumpectomy      SECTION       COLONOSCOPY      She had colonoscopy and endoscopy done on  November 15, 2012 for workup of iron deficiency anemia and the results were satisfactory     GYN SURGERY           HYSTERECTOMY TOTAL ABDOMINAL, BILATERAL SALPINGO-OOPHORECTOMY, COMBINED  2006     MASTECTOMY PARTIAL WITH SENTINEL NODE  2013    Procedure: MASTECTOMY PARTIAL WITH SENTINEL NODE;  RIGHT PARTIAL MASTECTOMY WITH RIGHT AXILLARY SENTINEL NODE BIOPSY;  Surgeon: Kae Padilla MD;  Location: [facility]     ORTHOPEDIC SURGERY      Right TKA and Left TKA       Social History   Substance Use Topics     Smoking status: Never Smoker     Smokeless tobacco: Never Used     Alcohol use 0.6 oz/week     1 Standard drinks or equivalent per week      Comment: 2-3 drinks wine per week     Family History   Problem Relation Age of Onset     Cardiovascular Father      Alzheimer Disease Mother      Cancer - colorectal No family hx of      Breast Cancer No family hx of          Current Outpatient Prescriptions   Medication Sig Dispense Refill     pravastatin (PRAVACHOL) 40 MG tablet Take 1 tablet (40 mg) by mouth daily (Patient taking differently: Take 40 mg by mouth At Bedtime ) 90 tablet 3     lisinopril (PRINIVIL/ZESTRIL) 40 MG tablet Take 1 tablet (40 mg) by mouth daily 90 tablet 3     amLODIPine (NORVASC) 2.5 MG tablet Take 1 tablet (2.5 mg) by mouth daily 90 tablet 3     hydrochlorothiazide (HYDRODIURIL) 25 MG tablet Take 1 tablet (25 mg) by mouth daily 90 tablet 1     polyethylene glycol (MIRALAX) powder Take 17 g by mouth daily as needed 510 g 1     venlafaxine (EFFEXOR XR) 150 MG 24 hr capsule Take by mouth daily (with breakfast)        Allergies   Allergen Reactions     Seafood Anaphylaxis     Femara [Letrozole] Rash     Latex Rash     WITH LATEX BANDAIDS     Tamoxifen Rash       Reviewed and updated as needed this visit by clinical staff       Reviewed and updated as needed this visit by Provider         ROS:  Constitutional, HEENT, cardiovascular, pulmonary, gi and gu systems are negative, except as  "otherwise noted.    POSITIVE for lump in LLQ with very mild pain upon palpitation      This document serves as a record of the services and decisions personally performed and made by Ame Villalobos MD. It was created on her behalf by Beni Estevez, a trained medical scribe. The creation of this document is based on the provider's statements to the medical scribe.    Jojoibremi Estevez 5:08 PM, March 27, 2017    OBJECTIVE:                                                    /74 (BP Location: Left arm, Patient Position: Chair, Cuff Size: Adult Large)  Pulse 97  Ht 1.626 m (5' 4\")  Wt 69.9 kg (154 lb)  LMP 09/01/2000  SpO2 99%  Breastfeeding? No  BMI 26.43 kg/m2  Body mass index is 26.43 kg/(m^2).  GENERAL APPEARANCE: healthy, alert and no distress  ABDOMEN: Palpable lump in LLQ next to the incision, otherwise soft, nontender, without hepatosplenomegaly or masses and bowel sounds normal  SKIN: Incision in midline of lower abdomen from previous pregnancy, otherwise no suspicious lesions or rashes  PSYCH: mentation appears normal and affect normal/bright    Reviewed 11/19/13 and 12/15/14 CT scans and 12/1/14 note from Dr. Saenz     ASSESSMENT/PLAN:                                                      Olivia was seen today for hernia.    Diagnoses and all orders for this visit:    Ventral hernia without obstruction or gangrene  Reviewed 2014 abdominal CT scan and note from Dr. Saenz  Sent referral to general surgery  She will make an appointment with Dr. Saenz  -     GENERAL SURG ADULT REFERRAL  Explained everything and answered her questions    Essential hypertensin:  Well controlled   Continue present management      Trigeminal neuralgia:  Resolved with surgical procedure      The total visit time was 25 minutes more  than 50% was spent in counseling and coordination of care as discussed above.    Follow up as needed  Patient was advised to seek sooner medical attention if there is any new or " worsening symptoms    The information in this document, created by the medical scribe for me, accurately reflects the services I personally performed and the decisions made by me. I have reviewed and approved this document for accuracy prior to leaving the patient care area.  Ame Villalobos MD  5:14 PM, 03/27/17    Ame Villalobos MD  [facility]

## 2017-03-27 NOTE — MR AVS SNAPSHOT
After Visit Summary   3/27/2017    Olivia Rodriguez    MRN: [id number]           Patient Information     Date Of Birth          1945        Visit Information        Provider Department      3/27/2017 4:30 PM Ame Villalobos MD [facility]        Today's Diagnoses     Ventral hernia without obstruction or gangrene    -  1      Care Instructions    Make appointment with general surgeon Dr. Saenz.  Follow up as needed        Follow-ups after your visit        Additional Services     GENERAL SURG ADULT REFERRAL       Your provider has referred you to: [facility]: [facility] [place] [phone]   [email or web address]    Please be aware that coverage of these services is subject to the terms and limitations of your health insurance plan.  Call member services at your health plan with any benefit or coverage questions.      Please bring the following with you to your appointment:    (1) Any X-Rays, CTs or MRIs which have been performed.  Contact the facility where they were done to arrange for  prior to your scheduled appointment.   (2) List of current medications   (3) This referral request   (4) Any documents/labs given to you for this referral                  Your next 10 appointments already scheduled     Jun 21, 2017  3:45 PM CDT   Office Visit with Hilary Ledezma DO   [facility] ([facility]    [address] [address] [phone]           Bring a current list of meds and any records pertaining to this visit.  For Physicals, please bring immunization records and any forms needing to be filled out.  Please arrive 10 minutes early to complete paperwork.              Who to contact     If you have questions or need follow up information about today's clinic visit or your schedule please contact [facility] directly at [phone].  Normal or non-critical lab and imaging  "results will be communicated to you by MyChart, letter or phone within 4 business days after the clinic has received the results. If you do not hear from us within 7 days, please contact the clinic through [facility] or phone. If you have a critical or abnormal lab result, we will notify you by phone as soon as possible.  Submit refill requests through [facility] or call your pharmacy and they will forward the refill request to us. Please allow 3 business days for your refill to be completed.          Additional Information About Your Visit        [facility] Information     [facility] gives you secure access to your electronic health record. If you see a primary care provider, you can also send messages to your care team and make appointments. If you have questions, please call your primary care clinic.  If you do not have a primary care provider, please call [phone] and they will assist you.        Care EveryWhere ID     This is your Care EveryWhere ID. This could be used by other organizations to access your [facility] medical records  [id number]        Your Vitals Were     Pulse Height Last Period Pulse Oximetry Breastfeeding? BMI (Body Mass Index)    97 5' 4\" (1.626 m) 09/01/2000 99% No 26.43 kg/m2       Blood Pressure from Last 3 Encounters:   03/27/17 132/74   03/03/17 108/65   02/17/17 (!) 159/94    Weight from Last 3 Encounters:   03/27/17 154 lb (69.9 kg)   03/03/17 147 lb (66.7 kg)   02/17/17 153 lb 7 oz (69.6 kg)              We Performed the Following     GENERAL SURG ADULT REFERRAL          Today's Medication Changes          These changes are accurate as of: 3/27/17  5:01 PM.  If you have any questions, ask your nurse or doctor.               These medicines have changed or have updated prescriptions.        Dose/Directions    pravastatin 40 MG tablet   Commonly known as:  PRAVACHOL   This may have changed:  when to take this   Used for:  Hyperlipidemia LDL goal <100        Dose:  40 mg   Take 1 tablet (40 " mg) by mouth daily   Quantity:  90 tablet   Refills:  3                Primary Care Provider Office Phone # Fax #    Ame Villalobos [phone]       [facility]    [address]  [place] [address]        Thank you!     Thank you for choosing [facility]  for your care. Our goal is always to provide you with excellent care. Hearing back from our patients is one way we can continue to improve our services. Please take a few minutes to complete the written survey that you may receive in the mail after your visit with us. Thank you!             Your Updated Medication List - Protect others around you: Learn how to safely use, store and throw away your medicines at www.disposemymeds.org.          This list is accurate as of: 3/27/17  5:01 PM.  Always use your most recent med list.                   Brand Name Dispense Instructions for use    amLODIPine 2.5 MG tablet    NORVASC    90 tablet    Take 1 tablet (2.5 mg) by mouth daily       EFFEXOR  MG 24 hr capsule   Generic drug:  venlafaxine      Take by mouth daily (with breakfast)       hydrochlorothiazide 25 MG tablet    HYDRODIURIL    90 tablet    Take 1 tablet (25 mg) by mouth daily       lisinopril 40 MG tablet    PRINIVIL/ZESTRIL    90 tablet    Take 1 tablet (40 mg) by mouth daily       polyethylene glycol powder    MIRALAX    510 g    Take 17 g by mouth daily as needed       pravastatin 40 MG tablet    PRAVACHOL    90 tablet    Take 1 tablet (40 mg) by mouth daily

## 2017-03-28 ASSESSMENT — PATIENT HEALTH QUESTIONNAIRE - PHQ9: SUM OF ALL RESPONSES TO PHQ QUESTIONS 1-9: 3

## 2017-04-03 DIAGNOSIS — I10 ESSENTIAL HYPERTENSION: ICD-10-CM

## 2017-04-03 DIAGNOSIS — E78.5 HYPERLIPIDEMIA LDL GOAL <100: ICD-10-CM

## 2017-04-03 NOTE — TELEPHONE ENCOUNTER
Medications on list are profile only        lisinopril (PRINIVIL/ZESTRIL) 40 MG tablet     Last Written Prescription Date: 12/22/2016  Last Fill Quantity: 90, # refills: 3  Last Office Visit with [facility] [place] or  Health prescribing provider: 3/27/2017  Next 5 appointments (look out 90 days)     Jun 21, 2017  3:45 PM CDT   Office Visit with Hilary Ledezma DO   [facility] ([facility]    [address] [facility] [address] [phone]                   Potassium   Date Value Ref Range Status   03/03/2017 4.2 3.4 - 5.3 mmol/L Final     Creatinine   Date Value Ref Range Status   03/03/2017 0.83 0.52 - 1.04 mg/dL Final     BP Readings from Last 3 Encounters:   03/27/17 132/74   03/03/17 108/65   02/17/17 (!) 159/94       pravastatin (PRAVACHOL) 40 MG tablet      Last Written Prescription Date: 12/22/2016  Last Fill Quantity: 90, # refills: 3  Last Office Visit with [facility] [place] or  Health prescribing provider: 3/27/2017  Next 5 appointments (look out 90 days)     Jun 21, 2017  3:45 PM CDT   Office Visit with Hilary Ledezma DO   [facility] ([facility]    [address] [facility] [address] [phone]                   Lab Results   Component Value Date    CHOL 257 12/22/2016     Lab Results   Component Value Date    HDL 67 12/22/2016     Lab Results   Component Value Date     12/22/2016     Lab Results   Component Value Date    TRIG 230 12/22/2016     Lab Results   Component Value Date    CHOLHDLRATIO 3.0 10/13/2015

## 2017-04-04 ENCOUNTER — OFFICE VISIT (OUTPATIENT)
Dept: SURGERY | Facility: CLINIC | Age: 72
End: 2017-04-04
Payer: COMMERCIAL

## 2017-04-04 ENCOUNTER — TELEPHONE (OUTPATIENT)
Dept: FAMILY MEDICINE | Facility: CLINIC | Age: 72
End: 2017-04-04

## 2017-04-04 VITALS
HEART RATE: 109 BPM | WEIGHT: 154 LBS | BODY MASS INDEX: 26.29 KG/M2 | DIASTOLIC BLOOD PRESSURE: 82 MMHG | HEIGHT: 64 IN | SYSTOLIC BLOOD PRESSURE: 148 MMHG

## 2017-04-04 DIAGNOSIS — K43.9 VENTRAL HERNIA WITHOUT OBSTRUCTION OR GANGRENE: Primary | ICD-10-CM

## 2017-04-04 PROCEDURE — 99205 OFFICE O/P NEW HI 60 MIN: CPT | Performed by: SURGERY

## 2017-04-04 NOTE — PROGRESS NOTES
[facility] General Surgery Clinic Consultation    CHIEF COMPLAINT:  Chief Complaint   Patient presents with     Consult     Ventral hernia        HISTORY OF PRESENT ILLNESS:  Olivia Rodriguez is a 72 year old female who is seen in consultation at the request of Dr. Villalobos for evaluation of an incisional ventral hernia. She reports she has had a hernia for several years. She was seen by Dr. Saenz and had a CT in  which confirms the hernia. She reports occasional pain from the hernia, especially with bending and activity. The bulge is larger. She has not tried to reduce it. She reports chronic constipation and takes dulcolax PO prn. She denies chronic cough. She has h/o mild memory loss and her  helped her with most of the history. She denies obstructive symptoms.     REVIEW OF SYSTEMS:  10 point review of systems completed and otherwise negative aside from as listed in HPI.     Past Medical History:   Diagnosis Date     Anxiety      Arthritis      Cancer (H) Aug 2013    right breast     Hypertension      Iron deficiency anaemia     Had endoscopy and colonoscopy done in 2012      TMJ arthralgia        Past Surgical History:   Procedure Laterality Date     BIOPSY  13    right breast biopsy     BIOPSY  13    right axillary lymph node     BREAST SURGERY  13    right breast partial mastectomy/lumpectomy      SECTION       COLONOSCOPY      She had colonoscopy and endoscopy done on November 15, 2012 for workup of iron deficiency anemia and the results were satisfactory     GYN SURGERY           HYSTERECTOMY TOTAL ABDOMINAL, BILATERAL SALPINGO-OOPHORECTOMY, COMBINED       MASTECTOMY PARTIAL WITH SENTINEL NODE  2013    Procedure: MASTECTOMY PARTIAL WITH SENTINEL NODE;  RIGHT PARTIAL MASTECTOMY WITH RIGHT AXILLARY SENTINEL NODE BIOPSY;  Surgeon: Kae Padilla MD;  Location: [facility]     ORTHOPEDIC SURGERY      Right TKA and Left TKA       Family History   Problem  Relation Age of Onset     Cardiovascular Father      Alzheimer Disease Mother      Cancer - colorectal No family hx of      Breast Cancer No family hx of        Social History   Substance Use Topics     Smoking status: Never Smoker     Smokeless tobacco: Never Used     Alcohol use 0.6 oz/week     1 Standard drinks or equivalent per week      Comment: 2-3 drinks wine per week       Patient Active Problem List   Diagnosis     Major depressive disorder, recurrent episode, moderate (H)     TMJ (temporomandibular joint syndrome)     Hyperlipidemia LDL goal <130     S/P total knee arthroplasty     S/P HEATHER-BSO (total abdominal hysterectomy and bilateral salpingo-oophorectomy)     Sensation of feeling cold     Health Care Home     Anxiety     Invasive ductal carcinoma of breast (H)     Memory difficulty     Controlled substance agreement signed     Essential hypertension     Coronary artery calcification     Hyperlipidemia LDL goal <100     Lung nodule     Malignant neoplasm of upper-outer quadrant of right female breast (H)     Trigeminal neuralgia     Status post total bilateral knee replacement       Allergies   Allergen Reactions     Seafood Anaphylaxis     Femara [Letrozole] Rash     Latex Rash     WITH LATEX BANDAIDS     Tamoxifen Rash       Current Outpatient Prescriptions   Medication Sig Dispense Refill     pravastatin (PRAVACHOL) 40 MG tablet Take 1 tablet (40 mg) by mouth daily (Patient taking differently: Take 40 mg by mouth At Bedtime ) 90 tablet 3     lisinopril (PRINIVIL/ZESTRIL) 40 MG tablet Take 1 tablet (40 mg) by mouth daily 90 tablet 3     amLODIPine (NORVASC) 2.5 MG tablet Take 1 tablet (2.5 mg) by mouth daily 90 tablet 3     hydrochlorothiazide (HYDRODIURIL) 25 MG tablet Take 1 tablet (25 mg) by mouth daily 90 tablet 1     polyethylene glycol (MIRALAX) powder Take 17 g by mouth daily as needed 510 g 1     venlafaxine (EFFEXOR XR) 150 MG 24 hr capsule Take by mouth daily (with breakfast)     "      Vitals: /82  Pulse 109  Ht 5' 4\" (1.626 m)  Wt 154 lb (69.9 kg)  LMP 09/01/2000  BMI 26.43 kg/m2  BMI= Body mass index is 26.43 kg/(m^2).    EXAM:  GENERAL: healthy, alert and no distress   PSYCH: pleasant, normal affect  HEENT: moist mucus membranes, no scleral icterus  CARDIOVASCULAR:  RRR  RESPIRATORY: non labored breathing  NECK: Neck supple. No adenopathy. Thyroid symmetric, normal size,  GI: soft, lower incisional hernia to left of hysterectomy incision, partially reducible, mildly tender to attempts at reduction.  no hepatosplenomegaly, normal bowel sounds, well healed vertical midline lower abdominal scar.   Extremities: warm and well perfused, no edema  SKIN: No suspicious lesions or rashes    LYMPH: no axillary adenopathy    All labs and imaging personally reviewed and significant for: a left sided lower abdominal ventral hernia    ASSESSMENT:  Olivia Rodriguez is a 72 year old yo with a PMH s/f htn, memory loss, depression and hyperlipidemia who presents with a symptomatic ventral incisional hernia. We had a lengthy discussion about the risks, benefits, indications and alternatives to surgery. She would like to proceed with repair as it is becoming more symptomatic. We discussed open vs laparoscopic vs combined repairs, I recommend a combined repair and she is in agreement.       PLAN:  Laparoscopic and open ventral hernia repair  She will see Dr. Villalobos for a pre operative history and physical.     It was my pleasure to participate in the care of Olivia Rodriguez in clinic today. Thank you for this consultation.     Total time with patient visit: 60 minutes including discussions about the plan of care and care coordination with the patient and family, .    Dayanara Kendall MD    Please route or send letter to:  Primary Care Provider (PCP) and Referring Provider      "

## 2017-04-04 NOTE — TELEPHONE ENCOUNTER
Reason for Call:  Other appointment    Detailed comments: pt is having surgery 4/10 and needs an appt  With Dr raymundo or someone else.    Phone Number Patient can be reached at: Home number on file [phone] (home   is calling.    Best Time: anytime    Can we leave a detailed message on this number? YES    Call taken on 4/4/2017 at 11:58 AM by Scarlett Song

## 2017-04-04 NOTE — LETTER
"    2017    [facility] General Surgery Clinic Consultation     RE:  Olivia Rodriguez-:  41     HISTORY OF PRESENT ILLNESS: Olivia Rodriguez is a 72 year old female who is seen in consultation at the request of Dr. Villalobos for evaluation of an incisional ventral hernia. She reports she has had a hernia for several years. She was seen by Dr. Saenz and had a CT in  which confirms the hernia. She reports occasional pain from the hernia, especially with bending and activity. The bulge is larger. She has not tried to reduce it. She reports chronic constipation and takes dulcolax PO prn. She denies chronic cough. She has h/o mild memory loss and her  helped her with most of the history. She denies obstructive symptoms.      REVIEW OF SYSTEMS:  10 point review of systems completed and otherwise negative aside from as listed in HPI.      Vitals: /82  Pulse 109  Ht 5' 4\" (1.626 m)  Wt 154 lb (69.9 kg)  LMP 2000  BMI 26.43 kg/m2  BMI= Body mass index is 26.43 kg/(m^2).     EXAM:  GENERAL: healthy, alert and no distress   PSYCH: pleasant, normal affect  HEENT: moist mucus membranes, no scleral icterus  CARDIOVASCULAR: RRR  RESPIRATORY: non labored breathing  NECK: Neck supple. No adenopathy. Thyroid symmetric, normal size,  GI: soft, lower incisional hernia to left of hysterectomy incision, partially reducible, mildly tender to attempts at reduction.  no hepatosplenomegaly, normal bowel sounds, well healed vertical midline lower abdominal scar.   Extremities: warm and well perfused, no edema  SKIN: No suspicious lesions or rashes   LYMPH: no axillary adenopathy     All labs and imaging personally reviewed and significant for: a left sided lower abdominal ventral hernia     ASSESSMENT:  Olivia Rodriguez is a 72 year old yo with a PMH s/f htn, memory loss, depression and hyperlipidemia who presents with a symptomatic ventral incisional hernia. We had a lengthy discussion about the risks, " benefits, indications and alternatives to surgery. She would like to proceed with repair as it is becoming more symptomatic. We discussed open vs laparoscopic vs combined repairs, I recommend a combined repair and she is in agreement.       PLAN:  Laparoscopic and open ventral hernia repair  She will see Dr. Villalobos for a pre operative history and physical.      It was my pleasure to participate in the care of Olivia Rodriguez in clinic today. Thank you for this consultation.         Dayanara Kendall MD

## 2017-04-04 NOTE — MR AVS SNAPSHOT
After Visit Summary   4/4/2017    Olivia Rodriguez    MRN: [id number]           Patient Information     Date Of Birth          1945        Visit Information        Provider Department      4/4/2017 9:15 AM Dayanara Kendall MD [facility] [place] [facility]      Care Instructions    Your surgery is scheduled on 4/10/17.        Follow-ups after your visit        Your next 10 appointments already scheduled     Jun 21, 2017  3:45 PM CDT   Office Visit with Hilary Ledezma,    [facility] ([facility])    [address] [facility] [address] [phone]           Bring a current list of meds and any records pertaining to this visit.  For Physicals, please bring immunization records and any forms needing to be filled out.  Please arrive 10 minutes early to complete paperwork.              Who to contact     If you have questions or need follow up information about today's clinic visit or your schedule please contact [facility] [place] directly at [phone].  Normal or non-critical lab and imaging results will be communicated to you by [employer], letter or phone within 4 business days after the clinic has received the results. If you do not hear from us within 7 days, please contact the clinic through [employer] or phone. If you have a critical or abnormal lab result, we will notify you by phone as soon as possible.  Submit refill requests through [employer] or call your pharmacy and they will forward the refill request to us. Please allow 3 business days for your refill to be completed.          Additional Information About Your Visit        [employer] Information     [employer] gives you secure access to your electronic health record. If you see a primary care provider, you can also send messages to your care team and make appointments. If you have questions, please call your primary care clinic.  If you do not have a primary care  "provider, please call [phone] and they will assist you.        Care EveryWhere ID     This is your Care EveryWhere ID. This could be used by other organizations to access your [facility] medical records  [id number]        Your Vitals Were     Pulse Height Last Period BMI (Body Mass Index)          109 5' 4\" (1.626 m) 09/01/2000 26.43 kg/m2         Blood Pressure from Last 3 Encounters:   04/04/17 148/82   03/27/17 132/74   03/03/17 108/65    Weight from Last 3 Encounters:   04/04/17 154 lb (69.9 kg)   03/27/17 154 lb (69.9 kg)   03/03/17 147 lb (66.7 kg)              Today, you had the following     No orders found for display         Today's Medication Changes          These changes are accurate as of: 4/4/17 10:00 AM.  If you have any questions, ask your nurse or doctor.               These medicines have changed or have updated prescriptions.        Dose/Directions    pravastatin 40 MG tablet   Commonly known as:  PRAVACHOL   This may have changed:  when to take this   Used for:  Hyperlipidemia LDL goal <100        Dose:  40 mg   Take 1 tablet (40 mg) by mouth daily   Quantity:  90 tablet   Refills:  3                Primary Care Provider Office Phone # Fax #    Ame Villalobos [phone]       [facility]    [address] [facility] [address]  [facility] [address]        Thank you!     Thank you for choosing [facility]  for your care. Our goal is always to provide you with excellent care. Hearing back from our patients is one way we can continue to improve our services. Please take a few minutes to complete the written survey that you may receive in the mail after your visit with us. Thank you!             Your Updated Medication List - Protect others around you: Learn how to safely use, store and throw away your medicines at www.disposemymeds.org.          This list is accurate as of: 4/4/17 10:00 AM.  Always use your most recent med list.                   " Brand Name Dispense Instructions for use    amLODIPine 2.5 MG tablet    NORVASC    90 tablet    Take 1 tablet (2.5 mg) by mouth daily       EFFEXOR  MG 24 hr capsule   Generic drug:  venlafaxine      Take by mouth daily (with breakfast)       hydrochlorothiazide 25 MG tablet    HYDRODIURIL    90 tablet    Take 1 tablet (25 mg) by mouth daily       lisinopril 40 MG tablet    PRINIVIL/ZESTRIL    90 tablet    Take 1 tablet (40 mg) by mouth daily       polyethylene glycol powder    MIRALAX    510 g    Take 17 g by mouth daily as needed       pravastatin 40 MG tablet    PRAVACHOL    90 tablet    Take 1 tablet (40 mg) by mouth daily

## 2017-04-05 DIAGNOSIS — E78.5 HYPERLIPIDEMIA LDL GOAL <100: Primary | ICD-10-CM

## 2017-04-05 RX ORDER — LISINOPRIL 40 MG/1
TABLET ORAL
Qty: 90 TABLET | Refills: 2 | Status: ON HOLD | OUTPATIENT
Start: 2017-04-05 | End: 2017-04-10

## 2017-04-05 RX ORDER — PRAVASTATIN SODIUM 40 MG
40 TABLET ORAL DAILY
Qty: 30 TABLET | Refills: 0 | Status: SHIPPED | OUTPATIENT
Start: 2017-04-05 | End: 2017-04-05

## 2017-04-05 RX ORDER — PRAVASTATIN SODIUM 40 MG
40 TABLET ORAL DAILY
Qty: 90 TABLET | Refills: 1 | Status: ON HOLD | OUTPATIENT
Start: 2017-04-05 | End: 2017-04-10

## 2017-04-05 NOTE — TELEPHONE ENCOUNTER
Provider, patient due for recheck lipids.  Lab pended, please sign and close note.  Nicky Cortes RN

## 2017-04-05 NOTE — TELEPHONE ENCOUNTER
Prescription approved per [facility] [place].  Nicky Cortes RN

## 2017-04-06 ENCOUNTER — OFFICE VISIT (OUTPATIENT)
Dept: FAMILY MEDICINE | Facility: CLINIC | Age: 72
End: 2017-04-06
Payer: COMMERCIAL

## 2017-04-06 VITALS
BODY MASS INDEX: 26.8 KG/M2 | DIASTOLIC BLOOD PRESSURE: 82 MMHG | OXYGEN SATURATION: 98 % | HEART RATE: 75 BPM | TEMPERATURE: 97.8 F | HEIGHT: 64 IN | WEIGHT: 157 LBS | SYSTOLIC BLOOD PRESSURE: 138 MMHG

## 2017-04-06 DIAGNOSIS — Z01.818 PREOP GENERAL PHYSICAL EXAM: Primary | ICD-10-CM

## 2017-04-06 DIAGNOSIS — I10 ESSENTIAL HYPERTENSION: ICD-10-CM

## 2017-04-06 DIAGNOSIS — F33.1 MAJOR DEPRESSIVE DISORDER, RECURRENT EPISODE, MODERATE (H): ICD-10-CM

## 2017-04-06 DIAGNOSIS — R41.3 MEMORY DIFFICULTY: ICD-10-CM

## 2017-04-06 DIAGNOSIS — N39.0 RECURRENT UTI: ICD-10-CM

## 2017-04-06 DIAGNOSIS — N39.0 UTI (URINARY TRACT INFECTION), UNCOMPLICATED: ICD-10-CM

## 2017-04-06 DIAGNOSIS — K43.9 VENTRAL HERNIA WITHOUT OBSTRUCTION OR GANGRENE: ICD-10-CM

## 2017-04-06 LAB
ALBUMIN UR-MCNC: NEGATIVE MG/DL
ANION GAP SERPL CALCULATED.3IONS-SCNC: 7 MMOL/L (ref 3–14)
APPEARANCE UR: CLEAR
BILIRUB UR QL STRIP: NEGATIVE
BUN SERPL-MCNC: 31 MG/DL (ref 7–30)
CALCIUM SERPL-MCNC: 9.1 MG/DL (ref 8.5–10.1)
CHLORIDE SERPL-SCNC: 100 MMOL/L (ref 94–109)
CO2 SERPL-SCNC: 31 MMOL/L (ref 20–32)
COLOR UR AUTO: YELLOW
CREAT SERPL-MCNC: 0.83 MG/DL (ref 0.52–1.04)
ERYTHROCYTE [DISTWIDTH] IN BLOOD BY AUTOMATED COUNT: 14.5 % (ref 10–15)
GFR SERPL CREATININE-BSD FRML MDRD: 67 ML/MIN/1.7M2
GLUCOSE SERPL-MCNC: 85 MG/DL (ref 70–99)
GLUCOSE UR STRIP-MCNC: NEGATIVE MG/DL
HCT VFR BLD AUTO: 37.8 % (ref 35–47)
HGB BLD-MCNC: 12.5 G/DL (ref 11.7–15.7)
HGB UR QL STRIP: NEGATIVE
KETONES UR STRIP-MCNC: NEGATIVE MG/DL
LEUKOCYTE ESTERASE UR QL STRIP: NEGATIVE
MCH RBC QN AUTO: 29.9 PG (ref 26.5–33)
MCHC RBC AUTO-ENTMCNC: 33.1 G/DL (ref 31.5–36.5)
MCV RBC AUTO: 90 FL (ref 78–100)
NITRATE UR QL: NEGATIVE
NON-SQ EPI CELLS #/AREA URNS LPF: NORMAL /LPF
PH UR STRIP: 5.5 PH (ref 5–7)
PLATELET # BLD AUTO: 374 10E9/L (ref 150–450)
POTASSIUM SERPL-SCNC: 3.3 MMOL/L (ref 3.4–5.3)
RBC # BLD AUTO: 4.18 10E12/L (ref 3.8–5.2)
RBC #/AREA URNS AUTO: NORMAL /HPF (ref 0–2)
SODIUM SERPL-SCNC: 138 MMOL/L (ref 133–144)
SP GR UR STRIP: 1.02 (ref 1–1.03)
URN SPEC COLLECT METH UR: NORMAL
UROBILINOGEN UR STRIP-ACNC: 0.2 EU/DL (ref 0.2–1)
WBC # BLD AUTO: 6.3 10E9/L (ref 4–11)
WBC #/AREA URNS AUTO: NORMAL /HPF (ref 0–2)

## 2017-04-06 PROCEDURE — 36415 COLL VENOUS BLD VENIPUNCTURE: CPT | Performed by: INTERNAL MEDICINE

## 2017-04-06 PROCEDURE — 85027 COMPLETE CBC AUTOMATED: CPT | Performed by: INTERNAL MEDICINE

## 2017-04-06 PROCEDURE — 99214 OFFICE O/P EST MOD 30 MIN: CPT | Performed by: INTERNAL MEDICINE

## 2017-04-06 PROCEDURE — 93000 ELECTROCARDIOGRAM COMPLETE: CPT | Performed by: INTERNAL MEDICINE

## 2017-04-06 PROCEDURE — 81001 URINALYSIS AUTO W/SCOPE: CPT | Performed by: INTERNAL MEDICINE

## 2017-04-06 PROCEDURE — 80048 BASIC METABOLIC PNL TOTAL CA: CPT | Performed by: INTERNAL MEDICINE

## 2017-04-06 RX ORDER — SULFAMETHOXAZOLE/TRIMETHOPRIM 800-160 MG
TABLET ORAL
Qty: 30 TABLET | Refills: 1 | Status: ON HOLD | OUTPATIENT
Start: 2017-04-06 | End: 2017-04-10

## 2017-04-06 NOTE — PATIENT INSTRUCTIONS
Before Your Surgery      Call your surgeon if there is any change in your health. This includes signs of a cold or flu (such as a sore throat, runny nose, cough, rash or fever).    Do not smoke, drink alcohol or take over the counter medicine (unless your surgeon or primary care doctor tells you to) for the 24 hours before and after surgery.    If you take prescribed drugs: Follow your doctor s orders about which medicines to take and which to stop until after surgery.    Eating and drinking prior to surgery: follow the instructions from your surgeon    Take a shower or bath the night before surgery. Use the soap your surgeon gave you to gently clean your skin. If you do not have soap from your surgeon, use your regular soap. Do not shave or scrub the surgery site.  Wear clean pajamas and have clean sheets on your bed.     Avoid aspirin 10 days before the surgery. Avoid nonsteroidal anti-inflammatory pain medication like ibuprofen, Motrin, or Aleve 3 days before the surgery.  Tylenol is okay to use for pain.  Avoid any OTC multivitamins or herbal supplement 7 days before surgery   Do not take lisinopril and hydrochlorothiazide on morning of surgery.  Resume after surgery

## 2017-04-06 NOTE — LETTER
[facility]  [address] [facility] [address]  [place] [address]  [phone]                                                                                               Date: 4/7/2017    Olivia Rodriguez                                                                               [address]   [place] [address]              Dear Olivia,    This is to inform you regarding your test result.    Your potassium is low .  Eat food rich in potassium like bananas  Urine test is negative for infection  CBC result which includes white count Hemoglobin and  Platelet Counts is normal.   Follow up in  3 months  Enclosed is a copy of your results.      It was a pleasure to see you at your last appointment. If you have any questions, please feel free to call myself or my nurse at [phone].          Sincerely,    Ame Villalobos MD/ Jeny SPRINGER CMA  Results for orders placed or performed in visit on 04/06/17   UA with Microscopic reflex to Culture   Result Value Ref Range    Color Urine Yellow     Appearance Urine Clear     Glucose Urine Negative NEG mg/dL    Bilirubin Urine Negative NEG    Ketones Urine Negative NEG mg/dL    Specific Gravity Urine 1.020 1.003 - 1.035    pH Urine 5.5 5.0 - 7.0 pH    Protein Albumin Urine Negative NEG mg/dL    Urobilinogen Urine 0.2 0.2 - 1.0 EU/dL    Nitrite Urine Negative NEG    Blood Urine Negative NEG    Leukocyte Esterase Urine Negative NEG    Source Midstream Urine     WBC Urine O - 2 0 - 2 /HPF    RBC Urine O - 2 0 - 2 /HPF    Squamous Epithelial /LPF Urine Few FEW /LPF   CBC with platelets   Result Value Ref Range    WBC 6.3 4.0 - 11.0 10e9/L    RBC Count 4.18 3.8 - 5.2 10e12/L    Hemoglobin 12.5 11.7 - 15.7 g/dL    Hematocrit 37.8 35.0 - 47.0 %    MCV 90 78 - 100 fl    MCH 29.9 26.5 - 33.0 pg    MCHC 33.1 31.5 - 36.5 g/dL    RDW 14.5 10.0 - 15.0 %    Platelet Count 374 150 - 450 10e9/L   Basic metabolic panel   Result Value Ref Range    Sodium 138 133 - 144 mmol/L     Potassium 3.3 (L) 3.4 - 5.3 mmol/L    Chloride 100 94 - 109 mmol/L    Carbon Dioxide 31 20 - 32 mmol/L    Anion Gap 7 3 - 14 mmol/L    Glucose 85 70 - 99 mg/dL    Urea Nitrogen 31 (H) 7 - 30 mg/dL    Creatinine 0.83 0.52 - 1.04 mg/dL    GFR Estimate 67 >60 mL/min/1.7m2    GFR Estimate If Black 81 >60 mL/min/1.7m2    Calcium 9.1 8.5 - 10.1 mg/dL

## 2017-04-06 NOTE — MR AVS SNAPSHOT
After Visit Summary   4/6/2017    Olivia Rodriguez    MRN: [id number]           Patient Information     Date Of Birth          1945        Visit Information        Provider Department      4/6/2017 2:30 PM Ame Villalobos MD [facility]        Today's Diagnoses     Preop general physical exam    -  1    Ventral hernia without obstruction or gangrene        UTI (urinary tract infection), uncomplicated        Memory difficulty        Major depressive disorder, recurrent episode, moderate (H)        Essential hypertension          Care Instructions      Before Your Surgery      Call your surgeon if there is any change in your health. This includes signs of a cold or flu (such as a sore throat, runny nose, cough, rash or fever).    Do not smoke, drink alcohol or take over the counter medicine (unless your surgeon or primary care doctor tells you to) for the 24 hours before and after surgery.    If you take prescribed drugs: Follow your doctor s orders about which medicines to take and which to stop until after surgery.    Eating and drinking prior to surgery: follow the instructions from your surgeon    Take a shower or bath the night before surgery. Use the soap your surgeon gave you to gently clean your skin. If you do not have soap from your surgeon, use your regular soap. Do not shave or scrub the surgery site.  Wear clean pajamas and have clean sheets on your bed.     Avoid aspirin 10 days before the surgery. Avoid nonsteroidal anti-inflammatory pain medication like ibuprofen, Motrin, or Aleve 3 days before the surgery.  Tylenol is okay to use for pain.  Avoid any OTC multivitamins or herbal supplement 7 days before surgery   Do not take lisinopril and hydrochlorothiazide on morning of surgery.  Resume after surgery          Follow-ups after your visit        Your next 10 appointments already scheduled     Apr 10, 2017 10:45 AM CDT   [facility] Same Day Surgery with  Dayanara Kendall MD   [facility]    [facility]   [phone]            Apr 10, 2017   Procedure with Dayanara Kendall MD   [facility] (--)    [address]  [place] [address]   [phone]            Jun 21, 2017  3:45 PM CDT   Office Visit with Hilary Ledezma,    [facility]    [address] [facility] [id number] [phone]           Bring a current list of meds and any records pertaining to this visit.  For Physicals, please bring immunization records and any forms needing to be filled out.  Please arrive 10 minutes early to complete paperwork.              Future tests that were ordered for you today     Open Future Orders        Priority Expected Expires Ordered    Lipid Profile (Chol, Trig, HDL, LDL calc) Routine  4/5/2018 4/5/2017            Who to contact     If you have questions or need follow up information about today's clinic visit or your schedule please contact [facility] directly at [phone].  Normal or non-critical lab and imaging results will be communicated to you by MyChart, letter or phone within 4 business days after the clinic has received the results. If you do not hear from us within 7 days, please contact the clinic through [employer] or phone. If you have a critical or abnormal lab result, we will notify you by phone as soon as possible.  Submit refill requests through [employer] or call your pharmacy and they will forward the refill request to us. Please allow 3 business days for your refill to be completed.          Additional Information About Your Visit        [employer] Information     [employer] gives you secure access to your electronic health record. If you see a primary care provider, you can also send messages to your care team and make appointments. If you have questions, please  "call your primary care clinic.  If you do not have a primary care provider, please call [phone] and they will assist you.        Care EveryWhere ID     This is your Care EveryWhere ID. This could be used by other organizations to access your [facility] medical records  [id number]        Your Vitals Were     Pulse Temperature Height Last Period Pulse Oximetry Breastfeeding?    75 97.8  F (36.6  C) (Oral) 5' 4\" (1.626 m) 09/01/2000 98% No    BMI (Body Mass Index)                   26.95 kg/m2            Blood Pressure from Last 3 Encounters:   04/06/17 138/82   04/04/17 148/82   03/27/17 132/74    Weight from Last 3 Encounters:   04/06/17 157 lb (71.2 kg)   04/04/17 154 lb (69.9 kg)   03/27/17 154 lb (69.9 kg)              We Performed the Following     Basic metabolic panel     CBC with platelets     EKG 12-lead complete w/read - Clinics     UA with Microscopic reflex to Culture        Primary Care Provider Office Phone # Fax #    Ame Villalobos [phone]       [facility]    [address]  [facility] [address]        Thank you!     Thank you for choosing [facility]  for your care. Our goal is always to provide you with excellent care. Hearing back from our patients is one way we can continue to improve our services. Please take a few minutes to complete the written survey that you may receive in the mail after your visit with us. Thank you!             Your Updated Medication List - Protect others around you: Learn how to safely use, store and throw away your medicines at www.disposemymeds.org.          This list is accurate as of: 4/6/17  3:03 PM.  Always use your most recent med list.                   Brand Name Dispense Instructions for use    amLODIPine 2.5 MG tablet    NORVASC    90 tablet    Take 1 tablet (2.5 mg) by mouth daily       EFFEXOR  MG 24 hr capsule   Generic drug:  venlafaxine      Take by mouth daily (with breakfast)    "    hydrochlorothiazide 25 MG tablet    HYDRODIURIL    90 tablet    Take 1 tablet (25 mg) by mouth daily       lisinopril 40 MG tablet    PRINIVIL/ZESTRIL    90 tablet    TAKE 1 TABLET DAILY       polyethylene glycol powder    MIRALAX    510 g    Take 17 g by mouth daily as needed       pravastatin 40 MG tablet    PRAVACHOL    90 tablet    Take 1 tablet (40 mg) by mouth daily Please call clinic to schedule lab visit for further refills of this medication.

## 2017-04-06 NOTE — PROGRESS NOTES
[address] [address]  [phone]  Dept: [phone]    PRE-OP EVALUATION:  Today's date: 2017    Olivia Rodriguez (: 1945) presents for pre-operative evaluation assessment as requested by Dr. Kendall.  She requires evaluation and anesthesia risk assessment prior to undergoing surgery/procedure for treatment of Hernia .  Proposed procedure: LAPAROSCOPIC AND OPEN INCISIONAL HERNIA REPAIR WITH MESH    Date of Surgery/ Procedure: 04/10/2017  Time of Surgery/ Procedure: 10:45am  Hospital/Surgical Facility: [facility] OR  Fax number for surgical facility:   Primary Physician: Ame Villalobos  Type of Anesthesia Anticipated: General    Patient has a Health Care Directive or Living Will:  YES IN EPIC    Preop Questions 2017   1.  Do you have a history of heart attack, stroke, stent, bypass or surgery on an artery in the head, neck, heart or legs? No   2.  Do you ever have any pain or discomfort in your chest? No   3.  Do you have a history of  Heart Failure? No   4.   Are you troubled by shortness of breath when:  walking on a level surface, or up a slight hill, or at night? No   5.  Do you currently have a cold, bronchitis or other respiratory infection? No   6.  Do you have a cough, shortness of breath, or wheezing? No   7.  Do you sometimes get pains in the calves of your legs when you walk? No   8. Do you or anyone in your family have previous history of blood clots? No   9.  Do you or does anyone in your family have a serious bleeding problem such as prolonged bleeding following surgeries or cuts? No   10. Have you ever had problems with anemia or been told to take iron pills? No   11. Have you had any abnormal blood loss such as black, tarry or bloody stools, or abnormal vaginal bleeding? No   12. Have you ever had a blood transfusion? No   13. Have you or any of your relatives ever had problems with anesthesia? No   14. Do you have sleep apnea,  excessive snoring or daytime drowsiness? No   15. Do you have any prosthetic heart valves? No   16. Do you have prosthetic joints? YES - Bilateral Knees   17. Is there any chance that you may be pregnant? No     HPI:                                                      Brief HPI related to upcoming procedure: Olivia is accompanied by her  today. She has  had a ventral hernia for several years. The bulge has been getting progressively larger and causing more discomfort over the years specially with activity. She decided to proceed with surgical repair as it is becoming more symptomatic. Feels ready for the surgery, her  will take care of her after the surgery. She has mild cognitive impairment.    In addition, she has hx of frequent UTI's. Has been on prophylactic treatment with Bactrim which she takes after intercourse. Today she complains of dysuria, specially in the morning.     See problem list for active medical problems.  Problems all longstanding and stable, except as noted/documented.  See ROS for pertinent symptoms related to these conditions.                                  MEDICAL HISTORY:                                                      Patient Active Problem List    Diagnosis Date Noted     Status post total bilateral knee replacement 03/03/2017     Priority: Medium     Trigeminal neuralgia 02/03/2017     Priority: Medium     Malignant neoplasm of upper-outer quadrant of right female breast (H) 12/22/2016     Priority: Medium     Coronary artery calcification 11/30/2015     Priority: Medium     seen on CT scan done on 11/17/2015       Hyperlipidemia LDL goal <100 11/30/2015     Priority: Medium     Lung nodule 11/30/2015     Priority: Medium     seen on CT scan done on 11/17/2015       Essential hypertension 10/08/2015     Priority: Medium     Controlled substance agreement signed 11/14/2013     Priority: Medium     Invasive ductal carcinoma of breast (H) 08/15/2013     Priority:  Medium     Memory difficulty 08/15/2013     Priority: Medium     Anxiety 2013     Priority: Medium     Health Care Home 2013     Priority: Medium     Tram Mercedes RN-BC  [phone]  [facility]               Major depressive disorder, recurrent episode, moderate (H) 2013     Priority: Medium     Dr. Ordaz       TMJ (temporomandibular joint syndrome) 2013     Priority: Medium     Hyperlipidemia LDL goal <130 2013     Priority: Medium     S/P total knee arthroplasty 2013     Priority: Medium     bilateral       S/P HEATHER-BSO (total abdominal hysterectomy and bilateral salpingo-oophorectomy) 2013     Priority: Medium     Sensation of feeling cold 2013     Priority: Medium      Past Medical History:   Diagnosis Date     Anxiety      Arthritis      Cancer (H) Aug 2013    right breast     Hypertension      Iron deficiency anaemia     Had endoscopy and colonoscopy done in 2012      TMJ arthralgia      Past Surgical History:   Procedure Laterality Date     BIOPSY  13    right breast biopsy     BIOPSY  13    right axillary lymph node     BREAST SURGERY  13    right breast partial mastectomy/lumpectomy      SECTION       COLONOSCOPY      She had colonoscopy and endoscopy done on November 15, 2012 for workup of iron deficiency anemia and the results were satisfactory     GYN SURGERY           HYSTERECTOMY TOTAL ABDOMINAL, BILATERAL SALPINGO-OOPHORECTOMY, COMBINED       MASTECTOMY PARTIAL WITH SENTINEL NODE  2013    Procedure: MASTECTOMY PARTIAL WITH SENTINEL NODE;  RIGHT PARTIAL MASTECTOMY WITH RIGHT AXILLARY SENTINEL NODE BIOPSY;  Surgeon: Kae Padilla MD;  Location: [facility]     ORTHOPEDIC SURGERY      Right TKA and Left TKA     Current Outpatient Prescriptions   Medication Sig Dispense Refill     sulfamethoxazole-trimethoprim (BACTRIM DS/SEPTRA DS) 800-160 MG per tablet Take one half tablet  after intercourse to prevent urinary tract infection. 30 tablet 1     lisinopril (PRINIVIL/ZESTRIL) 40 MG tablet TAKE 1 TABLET DAILY 90 tablet 2     pravastatin (PRAVACHOL) 40 MG tablet Take 1 tablet (40 mg) by mouth daily Please call clinic to schedule lab visit for further refills of this medication. 90 tablet 1     amLODIPine (NORVASC) 2.5 MG tablet Take 1 tablet (2.5 mg) by mouth daily 90 tablet 3     hydrochlorothiazide (HYDRODIURIL) 25 MG tablet Take 1 tablet (25 mg) by mouth daily 90 tablet 1     polyethylene glycol (MIRALAX) powder Take 17 g by mouth daily as needed 510 g 1     venlafaxine (EFFEXOR XR) 150 MG 24 hr capsule Take by mouth daily (with breakfast)        [DISCONTINUED] pravastatin (PRAVACHOL) 40 MG tablet Take 1 tablet (40 mg) by mouth daily (Patient taking differently: Take 40 mg by mouth At Bedtime ) 90 tablet 3     [DISCONTINUED] lisinopril (PRINIVIL/ZESTRIL) 40 MG tablet Take 1 tablet (40 mg) by mouth daily 90 tablet 3     OTC products: None, except as noted above    Allergies   Allergen Reactions     Seafood Anaphylaxis     Femara [Letrozole] Rash     Latex Rash     WITH LATEX BANDAIDS     Tamoxifen Rash      Latex Allergy: YES: Precautions to take: avoid latex    Social History   Substance Use Topics     Smoking status: Never Smoker     Smokeless tobacco: Never Used     Alcohol use 0.6 oz/week     1 Standard drinks or equivalent per week      Comment: 2-3 drinks wine per week     History   Drug Use No       REVIEW OF SYSTEMS:                                                    C: NEGATIVE for fever, chills, change in weight  E/M: NEGATIVE for ear, mouth and throat problems  R: NEGATIVE for significant cough or SOB  CV: NEGATIVE for chest pain, palpitations or peripheral edema    Anesthesia risk assessment   No specific hx of anesthesia complications but notes increased memory issues after recent anesthesia for right-sided percutaneous stereotactic radiofrequency rhizotomy . She has  "underlying memory issues which might have been exacerbated after anesthesia.   No family hx of anesthesia complications    This document serves as a record of the services and decisions personally performed and made by Ame Villalobos MD. It was created on her behalf by Kay Perdomo, a trained medical scribe. The creation of this document is based the provider's statements to the medical scribe.    Scribremi Perdomo 2:44 PM, April 6, 2017    EXAM:                                                    /82 (BP Location: Left arm, Patient Position: Chair, Cuff Size: Adult Regular)  Pulse 75  Temp 97.8  F (36.6  C) (Oral)  Ht 5' 4\" (1.626 m)  Wt 157 lb (71.2 kg)  LMP 09/01/2000  SpO2 98%  Breastfeeding? No  BMI 26.95 kg/m2  GENERAL APPEARANCE: alert and no distress  HENT: ear canals and TM's normal and nose and mouth without ulcers or lesions  RESP: lungs clear to auscultation - no rales, rhonchi or wheezes  CV: regular rate and rhythm, normal S1 S2, systolic heart murmur, no click or rub   ABDOMEN: soft, nontender, no HSM or masses and bowel sounds normal  NEURO: Normal strength and tone, sensory exam grossly normal, mentation intact and speech normal  PSYCH: evident mild cognitive impairment   DIAGNOSTICS:                                                    EKG: appears normal, NSR, and right bundle branch.which was seen on previous EKG also.  Echocardiogram done on 8/2013 shows:  The visual ejection fraction is estimated at 60-65%. No regional wall motion   abnormalities noted. E by E prime ratio is greater than 15, that likely   suggests increased left ventricular filling pressures.      Recent Labs   Lab Test  03/03/17   1151  02/17/17   1400  02/03/17   1309   09/12/14   0855   08/27/13   1438   HGB  13.9  12.9  14.5   < >  13.4   < >  11.4*   PLT  376   --   311   < >  321   < >  361   INR   --   0.98   --    --    --    --   0.95   NA  140   --   140   < >  138   --    --    POTASSIUM  4.2  3.1* "  3.7   < >  4.7   --    --    CR  0.83   --   0.88   < >  0.78   < >  0.74   A1C   --    --    --    --   5.8   --    --     < > = values in this interval not displayed.      Results for orders placed or performed in visit on 04/06/17   UA with Microscopic reflex to Culture   Result Value Ref Range    Color Urine Yellow     Appearance Urine Clear     Glucose Urine Negative NEG mg/dL    Bilirubin Urine Negative NEG    Ketones Urine Negative NEG mg/dL    Specific Gravity Urine 1.020 1.003 - 1.035    pH Urine 5.5 5.0 - 7.0 pH    Protein Albumin Urine Negative NEG mg/dL    Urobilinogen Urine 0.2 0.2 - 1.0 EU/dL    Nitrite Urine Negative NEG    Blood Urine Negative NEG    Leukocyte Esterase Urine Negative NEG    Source Midstream Urine     WBC Urine O - 2 0 - 2 /HPF    RBC Urine O - 2 0 - 2 /HPF    Squamous Epithelial /LPF Urine Few FEW /LPF   CBC with platelets   Result Value Ref Range    WBC 6.3 4.0 - 11.0 10e9/L    RBC Count 4.18 3.8 - 5.2 10e12/L    Hemoglobin 12.5 11.7 - 15.7 g/dL    Hematocrit 37.8 35.0 - 47.0 %    MCV 90 78 - 100 fl    MCH 29.9 26.5 - 33.0 pg    MCHC 33.1 31.5 - 36.5 g/dL    RDW 14.5 10.0 - 15.0 %    Platelet Count 374 150 - 450 10e9/L   Basic metabolic panel   Result Value Ref Range    Sodium 138 133 - 144 mmol/L    Potassium 3.3 (L) 3.4 - 5.3 mmol/L    Chloride 100 94 - 109 mmol/L    Carbon Dioxide 31 20 - 32 mmol/L    Anion Gap 7 3 - 14 mmol/L    Glucose 85 70 - 99 mg/dL    Urea Nitrogen 31 (H) 7 - 30 mg/dL    Creatinine 0.83 0.52 - 1.04 mg/dL    GFR Estimate 67 >60 mL/min/1.7m2    GFR Estimate If Black 81 >60 mL/min/1.7m2    Calcium 9.1 8.5 - 10.1 mg/dL       IMPRESSION:                                                    Reason for surgery/procedure: ventral hernia   Diagnosis/reason for consult: preop consult     The proposed surgical procedure is considered INTERMEDIATE risk.    REVISED CARDIAC RISK INDEX  The patient has the following serious cardiovascular risks for perioperative  complications such as (MI, PE, VFib and 3  AV Block):  No serious cardiac risks  INTERPRETATION: 1 risks: Class II (low risk - 0.9% complication rate)    The patient has the following additional risks for perioperative complications:  No identified additional risks    Olivia was seen today for pre-op exam.    Diagnoses and all orders for this visit:    Preop general physical exam  -     EKG 12-lead complete w/read - Clinics  -     CBC with platelets    Ventral hernia without obstruction or gangrene  She will be getting surgery for this     UTI (urinary tract infection), uncomplicated  UA was negative. She will continue prophylaxis with Bactrim after intercourse  -     UA with Microscopic reflex to Culture    Memory difficulty  Stable. She is scheduled to be evaluated by Dr. Ledezma     Major depressive disorder, recurrent episode, moderate (H)  Stable     Essential hypertension  Controlled   -     Basic metabolic panel    Olivia's psychiatrist is retiring soon. She is requesting to transfer medication refills to us.    RECOMMENDATIONS:                                                    --Patient is to take all scheduled medications on the day of surgery EXCEPT for modifications listed below.    ACE Inhibitor or Angiotensin Receptor Blocker (ARB) Use  HOLD lisinopril and HCTZ on the morning of surgery  Avoid aspirin 10 days before the surgery. Avoid nonsteroidal anti-inflammatory pain medication like ibuprofen, Motrin, or Aleve 3 days before the surgery.  Tylenol is okay to use for pain.  Avoid any OTC multivitamins or herbal supplement 7 days before surgery   Resume after surgery    Patient is OPTIMAL for the proposed procedure      Patient and her  had many questions which were answered.  The total visit time was 40 minutes more  than 50% was spent in counseling and coordination of care as discussed above.    The information in this document, created by the medical scribe for me, accurately reflects the  services I personally performed and the decisions made by me. I have reviewed and approved this document for accuracy prior to leaving the patient care area.    Signed Electronically by: Ame Villalobos MD    Copy of this evaluation report is provided to requesting physician.    [facility] Preop Guidelines

## 2017-04-06 NOTE — PROGRESS NOTES
This is to inform you regarding your test result.    Your potassium is low .  Eat food rich in potassium like bananas  Urine test is negative for infection  CBC result which includes white count Hemoglobin and  Platelet Counts is normal.   Follow up in  3 months    Dr.Nasima Wilfredo MD,FACP

## 2017-04-06 NOTE — NURSING NOTE
"Chief Complaint   Patient presents with     Pre-Op Exam     Hernia Surgery       Initial /82 (BP Location: Left arm, Patient Position: Chair, Cuff Size: Adult Regular)  Pulse 75  Temp 97.8  F (36.6  C) (Oral)  Ht 5' 4\" (1.626 m)  Wt 157 lb (71.2 kg)  LMP 09/01/2000  SpO2 98%  Breastfeeding? No  BMI 26.95 kg/m2 Estimated body mass index is 26.95 kg/(m^2) as calculated from the following:    Height as of this encounter: 5' 4\" (1.626 m).    Weight as of this encounter: 157 lb (71.2 kg).  Medication Reconciliation: complete   Judy Zaman- CHELLE      "

## 2017-04-07 NOTE — H&P (VIEW-ONLY)
[address] [address]  [phone]  Dept: [phone]    PRE-OP EVALUATION:  Today's date: 2017    Olivia Rodriguez (: 1945) presents for pre-operative evaluation assessment as requested by Dr. Kendall.  She requires evaluation and anesthesia risk assessment prior to undergoing surgery/procedure for treatment of Hernia .  Proposed procedure: LAPAROSCOPIC AND OPEN INCISIONAL HERNIA REPAIR WITH MESH    Date of Surgery/ Procedure: 04/10/2017  Time of Surgery/ Procedure: 10:45am  Hospital/Surgical Facility: [facility] OR  Fax number for surgical facility:   Primary Physician: Ame Villalobos  Type of Anesthesia Anticipated: General    Patient has a Health Care Directive or Living Will:  YES IN EPIC    Preop Questions 2017   1.  Do you have a history of heart attack, stroke, stent, bypass or surgery on an artery in the head, neck, heart or legs? No   2.  Do you ever have any pain or discomfort in your chest? No   3.  Do you have a history of  Heart Failure? No   4.   Are you troubled by shortness of breath when:  walking on a level surface, or up a slight hill, or at night? No   5.  Do you currently have a cold, bronchitis or other respiratory infection? No   6.  Do you have a cough, shortness of breath, or wheezing? No   7.  Do you sometimes get pains in the calves of your legs when you walk? No   8. Do you or anyone in your family have previous history of blood clots? No   9.  Do you or does anyone in your family have a serious bleeding problem such as prolonged bleeding following surgeries or cuts? No   10. Have you ever had problems with anemia or been told to take iron pills? No   11. Have you had any abnormal blood loss such as black, tarry or bloody stools, or abnormal vaginal bleeding? No   12. Have you ever had a blood transfusion? No   13. Have you or any of your relatives ever had problems with anesthesia? No   14. Do you have sleep apnea,  excessive snoring or daytime drowsiness? No   15. Do you have any prosthetic heart valves? No   16. Do you have prosthetic joints? YES - Bilateral Knees   17. Is there any chance that you may be pregnant? No     HPI:                                                      Brief HPI related to upcoming procedure: Olivia is accompanied by her  today. She has  had a ventral hernia for several years. The bulge has been getting progressively larger and causing more discomfort over the years specially with activity. She decided to proceed with surgical repair as it is becoming more symptomatic. Feels ready for the surgery, her  will take care of her after the surgery. She has mild cognitive impairment.    In addition, she has hx of frequent UTI's. Has been on prophylactic treatment with Bactrim which she takes after intercourse. Today she complains of dysuria, specially in the morning.     See problem list for active medical problems.  Problems all longstanding and stable, except as noted/documented.  See ROS for pertinent symptoms related to these conditions.                                  MEDICAL HISTORY:                                                      Patient Active Problem List    Diagnosis Date Noted     Status post total bilateral knee replacement 03/03/2017     Priority: Medium     Trigeminal neuralgia 02/03/2017     Priority: Medium     Malignant neoplasm of upper-outer quadrant of right female breast (H) 12/22/2016     Priority: Medium     Coronary artery calcification 11/30/2015     Priority: Medium     seen on CT scan done on 11/17/2015       Hyperlipidemia LDL goal <100 11/30/2015     Priority: Medium     Lung nodule 11/30/2015     Priority: Medium     seen on CT scan done on 11/17/2015       Essential hypertension 10/08/2015     Priority: Medium     Controlled substance agreement signed 11/14/2013     Priority: Medium     Invasive ductal carcinoma of breast (H) 08/15/2013     Priority:  Medium     Memory difficulty 08/15/2013     Priority: Medium     Anxiety 2013     Priority: Medium     Health Care Home 2013     Priority: Medium     Tram Mercedes RN-BC  [phone]  [facility]               Major depressive disorder, recurrent episode, moderate (H) 2013     Priority: Medium     Dr. Ordaz       TMJ (temporomandibular joint syndrome) 2013     Priority: Medium     Hyperlipidemia LDL goal <130 2013     Priority: Medium     S/P total knee arthroplasty 2013     Priority: Medium     bilateral       S/P HEATHER-BSO (total abdominal hysterectomy and bilateral salpingo-oophorectomy) 2013     Priority: Medium     Sensation of feeling cold 2013     Priority: Medium      Past Medical History:   Diagnosis Date     Anxiety      Arthritis      Cancer (H) Aug 2013    right breast     Hypertension      Iron deficiency anaemia     Had endoscopy and colonoscopy done in 2012      TMJ arthralgia      Past Surgical History:   Procedure Laterality Date     BIOPSY  13    right breast biopsy     BIOPSY  13    right axillary lymph node     BREAST SURGERY  13    right breast partial mastectomy/lumpectomy      SECTION       COLONOSCOPY      She had colonoscopy and endoscopy done on November 15, 2012 for workup of iron deficiency anemia and the results were satisfactory     GYN SURGERY           HYSTERECTOMY TOTAL ABDOMINAL, BILATERAL SALPINGO-OOPHORECTOMY, COMBINED       MASTECTOMY PARTIAL WITH SENTINEL NODE  2013    Procedure: MASTECTOMY PARTIAL WITH SENTINEL NODE;  RIGHT PARTIAL MASTECTOMY WITH RIGHT AXILLARY SENTINEL NODE BIOPSY;  Surgeon: Kae Padilla MD;  Location: [facility]     ORTHOPEDIC SURGERY      Right TKA and Left TKA     Current Outpatient Prescriptions   Medication Sig Dispense Refill     sulfamethoxazole-trimethoprim (BACTRIM DS/SEPTRA DS) 800-160 MG per tablet Take one half tablet  after intercourse to prevent urinary tract infection. 30 tablet 1     lisinopril (PRINIVIL/ZESTRIL) 40 MG tablet TAKE 1 TABLET DAILY 90 tablet 2     pravastatin (PRAVACHOL) 40 MG tablet Take 1 tablet (40 mg) by mouth daily Please call clinic to schedule lab visit for further refills of this medication. 90 tablet 1     amLODIPine (NORVASC) 2.5 MG tablet Take 1 tablet (2.5 mg) by mouth daily 90 tablet 3     hydrochlorothiazide (HYDRODIURIL) 25 MG tablet Take 1 tablet (25 mg) by mouth daily 90 tablet 1     polyethylene glycol (MIRALAX) powder Take 17 g by mouth daily as needed 510 g 1     venlafaxine (EFFEXOR XR) 150 MG 24 hr capsule Take by mouth daily (with breakfast)        [DISCONTINUED] pravastatin (PRAVACHOL) 40 MG tablet Take 1 tablet (40 mg) by mouth daily (Patient taking differently: Take 40 mg by mouth At Bedtime ) 90 tablet 3     [DISCONTINUED] lisinopril (PRINIVIL/ZESTRIL) 40 MG tablet Take 1 tablet (40 mg) by mouth daily 90 tablet 3     OTC products: None, except as noted above    Allergies   Allergen Reactions     Seafood Anaphylaxis     Femara [Letrozole] Rash     Latex Rash     WITH LATEX BANDAIDS     Tamoxifen Rash      Latex Allergy: YES: Precautions to take: avoid latex    Social History   Substance Use Topics     Smoking status: Never Smoker     Smokeless tobacco: Never Used     Alcohol use 0.6 oz/week     1 Standard drinks or equivalent per week      Comment: 2-3 drinks wine per week     History   Drug Use No       REVIEW OF SYSTEMS:                                                    C: NEGATIVE for fever, chills, change in weight  E/M: NEGATIVE for ear, mouth and throat problems  R: NEGATIVE for significant cough or SOB  CV: NEGATIVE for chest pain, palpitations or peripheral edema    Anesthesia risk assessment   No specific hx of anesthesia complications but notes increased memory issues after recent anesthesia for right-sided percutaneous stereotactic radiofrequency rhizotomy . She has  "underlying memory issues which might have been exacerbated after anesthesia.   No family hx of anesthesia complications    This document serves as a record of the services and decisions personally performed and made by Ame Villalobos MD. It was created on her behalf by Kay Perdomo, a trained medical scribe. The creation of this document is based the provider's statements to the medical scribe.    Scribremi Perdomo 2:44 PM, April 6, 2017    EXAM:                                                    /82 (BP Location: Left arm, Patient Position: Chair, Cuff Size: Adult Regular)  Pulse 75  Temp 97.8  F (36.6  C) (Oral)  Ht 5' 4\" (1.626 m)  Wt 157 lb (71.2 kg)  LMP 09/01/2000  SpO2 98%  Breastfeeding? No  BMI 26.95 kg/m2  GENERAL APPEARANCE: alert and no distress  HENT: ear canals and TM's normal and nose and mouth without ulcers or lesions  RESP: lungs clear to auscultation - no rales, rhonchi or wheezes  CV: regular rate and rhythm, normal S1 S2, systolic heart murmur, no click or rub   ABDOMEN: soft, nontender, no HSM or masses and bowel sounds normal  NEURO: Normal strength and tone, sensory exam grossly normal, mentation intact and speech normal  PSYCH: evident mild cognitive impairment   DIAGNOSTICS:                                                    EKG: appears normal, NSR, and right bundle branch.which was seen on previous EKG also.  Echocardiogram done on 8/2013 shows:  The visual ejection fraction is estimated at 60-65%. No regional wall motion   abnormalities noted. E by E prime ratio is greater than 15, that likely   suggests increased left ventricular filling pressures.      Recent Labs   Lab Test  03/03/17   1151  02/17/17   1400  02/03/17   1309   09/12/14   0855   08/27/13   1438   HGB  13.9  12.9  14.5   < >  13.4   < >  11.4*   PLT  376   --   311   < >  321   < >  361   INR   --   0.98   --    --    --    --   0.95   NA  140   --   140   < >  138   --    --    POTASSIUM  4.2  3.1* "  3.7   < >  4.7   --    --    CR  0.83   --   0.88   < >  0.78   < >  0.74   A1C   --    --    --    --   5.8   --    --     < > = values in this interval not displayed.      Results for orders placed or performed in visit on 04/06/17   UA with Microscopic reflex to Culture   Result Value Ref Range    Color Urine Yellow     Appearance Urine Clear     Glucose Urine Negative NEG mg/dL    Bilirubin Urine Negative NEG    Ketones Urine Negative NEG mg/dL    Specific Gravity Urine 1.020 1.003 - 1.035    pH Urine 5.5 5.0 - 7.0 pH    Protein Albumin Urine Negative NEG mg/dL    Urobilinogen Urine 0.2 0.2 - 1.0 EU/dL    Nitrite Urine Negative NEG    Blood Urine Negative NEG    Leukocyte Esterase Urine Negative NEG    Source Midstream Urine     WBC Urine O - 2 0 - 2 /HPF    RBC Urine O - 2 0 - 2 /HPF    Squamous Epithelial /LPF Urine Few FEW /LPF   CBC with platelets   Result Value Ref Range    WBC 6.3 4.0 - 11.0 10e9/L    RBC Count 4.18 3.8 - 5.2 10e12/L    Hemoglobin 12.5 11.7 - 15.7 g/dL    Hematocrit 37.8 35.0 - 47.0 %    MCV 90 78 - 100 fl    MCH 29.9 26.5 - 33.0 pg    MCHC 33.1 31.5 - 36.5 g/dL    RDW 14.5 10.0 - 15.0 %    Platelet Count 374 150 - 450 10e9/L   Basic metabolic panel   Result Value Ref Range    Sodium 138 133 - 144 mmol/L    Potassium 3.3 (L) 3.4 - 5.3 mmol/L    Chloride 100 94 - 109 mmol/L    Carbon Dioxide 31 20 - 32 mmol/L    Anion Gap 7 3 - 14 mmol/L    Glucose 85 70 - 99 mg/dL    Urea Nitrogen 31 (H) 7 - 30 mg/dL    Creatinine 0.83 0.52 - 1.04 mg/dL    GFR Estimate 67 >60 mL/min/1.7m2    GFR Estimate If Black 81 >60 mL/min/1.7m2    Calcium 9.1 8.5 - 10.1 mg/dL       IMPRESSION:                                                    Reason for surgery/procedure: ventral hernia   Diagnosis/reason for consult: preop consult     The proposed surgical procedure is considered INTERMEDIATE risk.    REVISED CARDIAC RISK INDEX  The patient has the following serious cardiovascular risks for perioperative  complications such as (MI, PE, VFib and 3  AV Block):  No serious cardiac risks  INTERPRETATION: 1 risks: Class II (low risk - 0.9% complication rate)    The patient has the following additional risks for perioperative complications:  No identified additional risks    Olivia was seen today for pre-op exam.    Diagnoses and all orders for this visit:    Preop general physical exam  -     EKG 12-lead complete w/read - Clinics  -     CBC with platelets    Ventral hernia without obstruction or gangrene  She will be getting surgery for this     UTI (urinary tract infection), uncomplicated  UA was negative. She will continue prophylaxis with Bactrim after intercourse  -     UA with Microscopic reflex to Culture    Memory difficulty  Stable. She is scheduled to be evaluated by Dr. Ledezma     Major depressive disorder, recurrent episode, moderate (H)  Stable     Essential hypertension  Controlled   -     Basic metabolic panel    Olivia's psychiatrist is retiring soon. She is requesting to transfer medication refills to us.    RECOMMENDATIONS:                                                    --Patient is to take all scheduled medications on the day of surgery EXCEPT for modifications listed below.    ACE Inhibitor or Angiotensin Receptor Blocker (ARB) Use  HOLD lisinopril and HCTZ on the morning of surgery  Avoid aspirin 10 days before the surgery. Avoid nonsteroidal anti-inflammatory pain medication like ibuprofen, Motrin, or Aleve 3 days before the surgery.  Tylenol is okay to use for pain.  Avoid any OTC multivitamins or herbal supplement 7 days before surgery   Resume after surgery    Patient is OPTIMAL for the proposed procedure      Patient and her  had many questions which were answered.  The total visit time was 40 minutes more  than 50% was spent in counseling and coordination of care as discussed above.    The information in this document, created by the medical scribe for me, accurately reflects the  services I personally performed and the decisions made by me. I have reviewed and approved this document for accuracy prior to leaving the patient care area.    Signed Electronically by: Ame Villalobos MD    Copy of this evaluation report is provided to requesting physician.    [place] Preop Guidelines

## 2017-04-10 ENCOUNTER — OFFICE VISIT (OUTPATIENT)
Dept: SURGERY | Facility: PHYSICIAN GROUP | Age: 72
End: 2017-04-10
Payer: COMMERCIAL

## 2017-04-10 ENCOUNTER — SURGERY (OUTPATIENT)
Age: 72
End: 2017-04-10

## 2017-04-10 ENCOUNTER — ANESTHESIA EVENT (OUTPATIENT)
Dept: SURGERY | Facility: CLINIC | Age: 72
End: 2017-04-10
Payer: COMMERCIAL

## 2017-04-10 ENCOUNTER — HOSPITAL ENCOUNTER (OUTPATIENT)
Facility: CLINIC | Age: 72
Discharge: HOME | End: 2017-04-12
Attending: SURGERY | Admitting: SURGERY
Payer: COMMERCIAL

## 2017-04-10 ENCOUNTER — ANESTHESIA (OUTPATIENT)
Dept: SURGERY | Facility: CLINIC | Age: 72
End: 2017-04-10
Payer: COMMERCIAL

## 2017-04-10 DIAGNOSIS — K43.2 INCISIONAL HERNIA, WITHOUT OBSTRUCTION OR GANGRENE: Primary | ICD-10-CM

## 2017-04-10 LAB
CREAT SERPL-MCNC: 0.83 MG/DL (ref 0.52–1.04)
GFR SERPL CREATININE-BSD FRML MDRD: 68 ML/MIN/1.7M2
PLATELET # BLD AUTO: 320 10E9/L (ref 150–450)

## 2017-04-10 PROCEDURE — 25000128 H RX IP 250 OP 636: Performed by: NURSE ANESTHETIST, CERTIFIED REGISTERED

## 2017-04-10 PROCEDURE — 40000170 ZZH STATISTIC PRE-PROCEDURE ASSESSMENT II: Performed by: SURGERY

## 2017-04-10 PROCEDURE — 25000132 ZZH RX MED GY IP 250 OP 250 PS 637: Performed by: PHYSICIAN ASSISTANT

## 2017-04-10 PROCEDURE — 25000125 ZZHC RX 250: Performed by: ANESTHESIOLOGY

## 2017-04-10 PROCEDURE — 49560 ZZHC REPAIR INCISIONAL HERNIA,REDUCIBLE: CPT | Performed by: SURGERY

## 2017-04-10 PROCEDURE — 25000125 ZZHC RX 250: Performed by: SURGERY

## 2017-04-10 PROCEDURE — 49568 ZZHC REPAIR HERNIA WITH MESH: CPT | Mod: AS | Performed by: PHYSICIAN ASSISTANT

## 2017-04-10 PROCEDURE — 49560 ZZHC REPAIR INCISIONAL HERNIA,REDUCIBLE: CPT | Mod: AS | Performed by: PHYSICIAN ASSISTANT

## 2017-04-10 PROCEDURE — 82565 ASSAY OF CREATININE: CPT | Performed by: PHYSICIAN ASSISTANT

## 2017-04-10 PROCEDURE — 25000125 ZZHC RX 250: Performed by: NURSE ANESTHETIST, CERTIFIED REGISTERED

## 2017-04-10 PROCEDURE — 71000012 ZZH RECOVERY PHASE 1 LEVEL 1 FIRST HR: Performed by: SURGERY

## 2017-04-10 PROCEDURE — 25000566 ZZH SEVOFLURANE, EA 15 MIN: Performed by: SURGERY

## 2017-04-10 PROCEDURE — 36000058 ZZH SURGERY LEVEL 3 EA 15 ADDTL MIN: Performed by: SURGERY

## 2017-04-10 PROCEDURE — 25000132 ZZH RX MED GY IP 250 OP 250 PS 637: Performed by: SURGERY

## 2017-04-10 PROCEDURE — 25800025 ZZH RX 258: Performed by: NURSE ANESTHETIST, CERTIFIED REGISTERED

## 2017-04-10 PROCEDURE — 36415 COLL VENOUS BLD VENIPUNCTURE: CPT | Performed by: PHYSICIAN ASSISTANT

## 2017-04-10 PROCEDURE — C1781 MESH (IMPLANTABLE): HCPCS | Performed by: SURGERY

## 2017-04-10 PROCEDURE — 49568 ZZHC REPAIR HERNIA WITH MESH: CPT | Performed by: SURGERY

## 2017-04-10 PROCEDURE — 36000056 ZZH SURGERY LEVEL 3 1ST 30 MIN: Performed by: SURGERY

## 2017-04-10 PROCEDURE — 25000128 H RX IP 250 OP 636: Performed by: SURGERY

## 2017-04-10 PROCEDURE — 25000128 H RX IP 250 OP 636: Performed by: PHYSICIAN ASSISTANT

## 2017-04-10 PROCEDURE — 25000128 H RX IP 250 OP 636: Performed by: ANESTHESIOLOGY

## 2017-04-10 PROCEDURE — 85049 AUTOMATED PLATELET COUNT: CPT | Performed by: PHYSICIAN ASSISTANT

## 2017-04-10 PROCEDURE — 37000008 ZZH ANESTHESIA TECHNICAL FEE, 1ST 30 MIN: Performed by: SURGERY

## 2017-04-10 PROCEDURE — S0020 INJECTION, BUPIVICAINE HYDRO: HCPCS | Performed by: SURGERY

## 2017-04-10 PROCEDURE — 25800025 ZZH RX 258: Performed by: PHYSICIAN ASSISTANT

## 2017-04-10 PROCEDURE — 37000009 ZZH ANESTHESIA TECHNICAL FEE, EACH ADDTL 15 MIN: Performed by: SURGERY

## 2017-04-10 PROCEDURE — 27210794 ZZH OR GENERAL SUPPLY STERILE: Performed by: SURGERY

## 2017-04-10 DEVICE — MESH SYMBOTEX COMPOSITE STEX ROUND 9CM SYM9: Type: IMPLANTABLE DEVICE | Site: ABDOMEN | Status: FUNCTIONAL

## 2017-04-10 RX ORDER — DEXAMETHASONE SODIUM PHOSPHATE 4 MG/ML
INJECTION, SOLUTION INTRA-ARTICULAR; INTRALESIONAL; INTRAMUSCULAR; INTRAVENOUS; SOFT TISSUE PRN
Status: DISCONTINUED | OUTPATIENT
Start: 2017-04-10 | End: 2017-04-10

## 2017-04-10 RX ORDER — POTASSIUM CHLORIDE 7.45 MG/ML
10 INJECTION INTRAVENOUS
Status: DISCONTINUED | OUTPATIENT
Start: 2017-04-10 | End: 2017-04-11

## 2017-04-10 RX ORDER — HYDROMORPHONE HYDROCHLORIDE 1 MG/ML
0.2 INJECTION, SOLUTION INTRAMUSCULAR; INTRAVENOUS; SUBCUTANEOUS
Status: DISCONTINUED | OUTPATIENT
Start: 2017-04-10 | End: 2017-04-10

## 2017-04-10 RX ORDER — PROCHLORPERAZINE MALEATE 5 MG
5 TABLET ORAL EVERY 6 HOURS PRN
Status: DISCONTINUED | OUTPATIENT
Start: 2017-04-10 | End: 2017-04-12 | Stop reason: HOSPADM

## 2017-04-10 RX ORDER — NEOSTIGMINE METHYLSULFATE 1 MG/ML
VIAL (ML) INJECTION PRN
Status: DISCONTINUED | OUTPATIENT
Start: 2017-04-10 | End: 2017-04-10

## 2017-04-10 RX ORDER — LIDOCAINE HYDROCHLORIDE 20 MG/ML
INJECTION, SOLUTION INFILTRATION; PERINEURAL PRN
Status: DISCONTINUED | OUTPATIENT
Start: 2017-04-10 | End: 2017-04-10

## 2017-04-10 RX ORDER — MEPERIDINE HYDROCHLORIDE 25 MG/ML
12.5 INJECTION INTRAMUSCULAR; INTRAVENOUS; SUBCUTANEOUS
Status: DISCONTINUED | OUTPATIENT
Start: 2017-04-10 | End: 2017-04-10

## 2017-04-10 RX ORDER — BUPIVACAINE HYDROCHLORIDE 5 MG/ML
INJECTION, SOLUTION PERINEURAL PRN
Status: DISCONTINUED | OUTPATIENT
Start: 2017-04-10 | End: 2017-04-10 | Stop reason: HOSPADM

## 2017-04-10 RX ORDER — HYDROCHLOROTHIAZIDE 25 MG/1
25 TABLET ORAL DAILY
Status: DISCONTINUED | OUTPATIENT
Start: 2017-04-10 | End: 2017-04-11

## 2017-04-10 RX ORDER — NALOXONE HYDROCHLORIDE 0.4 MG/ML
.1-.4 INJECTION, SOLUTION INTRAMUSCULAR; INTRAVENOUS; SUBCUTANEOUS
Status: DISCONTINUED | OUTPATIENT
Start: 2017-04-10 | End: 2017-04-10

## 2017-04-10 RX ORDER — PROPOFOL 10 MG/ML
INJECTION, EMULSION INTRAVENOUS PRN
Status: DISCONTINUED | OUTPATIENT
Start: 2017-04-10 | End: 2017-04-10

## 2017-04-10 RX ORDER — ONDANSETRON 2 MG/ML
4 INJECTION INTRAMUSCULAR; INTRAVENOUS EVERY 6 HOURS PRN
Status: DISCONTINUED | OUTPATIENT
Start: 2017-04-10 | End: 2017-04-12 | Stop reason: HOSPADM

## 2017-04-10 RX ORDER — POTASSIUM CHLORIDE 1.5 G/1.58G
20-40 POWDER, FOR SOLUTION ORAL
Status: DISCONTINUED | OUTPATIENT
Start: 2017-04-10 | End: 2017-04-11

## 2017-04-10 RX ORDER — HYDROMORPHONE HYDROCHLORIDE 1 MG/ML
.3-.5 INJECTION, SOLUTION INTRAMUSCULAR; INTRAVENOUS; SUBCUTANEOUS
Status: DISCONTINUED | OUTPATIENT
Start: 2017-04-10 | End: 2017-04-12 | Stop reason: HOSPADM

## 2017-04-10 RX ORDER — ONDANSETRON 4 MG/1
4 TABLET, ORALLY DISINTEGRATING ORAL EVERY 30 MIN PRN
Status: DISCONTINUED | OUTPATIENT
Start: 2017-04-10 | End: 2017-04-10

## 2017-04-10 RX ORDER — NALOXONE HYDROCHLORIDE 0.4 MG/ML
.1-.4 INJECTION, SOLUTION INTRAMUSCULAR; INTRAVENOUS; SUBCUTANEOUS
Status: DISCONTINUED | OUTPATIENT
Start: 2017-04-10 | End: 2017-04-12 | Stop reason: HOSPADM

## 2017-04-10 RX ORDER — CEFAZOLIN SODIUM 1 G/3ML
1 INJECTION, POWDER, FOR SOLUTION INTRAMUSCULAR; INTRAVENOUS SEE ADMIN INSTRUCTIONS
Status: DISCONTINUED | OUTPATIENT
Start: 2017-04-10 | End: 2017-04-10 | Stop reason: HOSPADM

## 2017-04-10 RX ORDER — SODIUM CHLORIDE, SODIUM LACTATE, POTASSIUM CHLORIDE, CALCIUM CHLORIDE 600; 310; 30; 20 MG/100ML; MG/100ML; MG/100ML; MG/100ML
INJECTION, SOLUTION INTRAVENOUS CONTINUOUS PRN
Status: DISCONTINUED | OUTPATIENT
Start: 2017-04-10 | End: 2017-04-10

## 2017-04-10 RX ORDER — DIAZEPAM 10 MG/2ML
5 INJECTION, SOLUTION INTRAMUSCULAR; INTRAVENOUS EVERY 8 HOURS PRN
Status: DISCONTINUED | OUTPATIENT
Start: 2017-04-10 | End: 2017-04-12 | Stop reason: HOSPADM

## 2017-04-10 RX ORDER — ONDANSETRON 2 MG/ML
INJECTION INTRAMUSCULAR; INTRAVENOUS PRN
Status: DISCONTINUED | OUTPATIENT
Start: 2017-04-10 | End: 2017-04-10

## 2017-04-10 RX ORDER — POTASSIUM CHLORIDE 29.8 MG/ML
20 INJECTION INTRAVENOUS
Status: DISCONTINUED | OUTPATIENT
Start: 2017-04-10 | End: 2017-04-11

## 2017-04-10 RX ORDER — FENTANYL CITRATE 50 UG/ML
INJECTION, SOLUTION INTRAMUSCULAR; INTRAVENOUS PRN
Status: DISCONTINUED | OUTPATIENT
Start: 2017-04-10 | End: 2017-04-10

## 2017-04-10 RX ORDER — DEXTROSE MONOHYDRATE, SODIUM CHLORIDE, AND POTASSIUM CHLORIDE 50; 1.49; 4.5 G/1000ML; G/1000ML; G/1000ML
INJECTION, SOLUTION INTRAVENOUS CONTINUOUS
Status: DISCONTINUED | OUTPATIENT
Start: 2017-04-10 | End: 2017-04-11

## 2017-04-10 RX ORDER — HYDROCODONE BITARTRATE AND ACETAMINOPHEN 5; 325 MG/1; MG/1
1-2 TABLET ORAL EVERY 4 HOURS PRN
Status: DISCONTINUED | OUTPATIENT
Start: 2017-04-10 | End: 2017-04-12 | Stop reason: HOSPADM

## 2017-04-10 RX ORDER — ACETAMINOPHEN 325 MG/1
650 TABLET ORAL EVERY 6 HOURS PRN
Status: DISCONTINUED | OUTPATIENT
Start: 2017-04-10 | End: 2017-04-12 | Stop reason: HOSPADM

## 2017-04-10 RX ORDER — SODIUM CHLORIDE, SODIUM LACTATE, POTASSIUM CHLORIDE, CALCIUM CHLORIDE 600; 310; 30; 20 MG/100ML; MG/100ML; MG/100ML; MG/100ML
INJECTION, SOLUTION INTRAVENOUS CONTINUOUS
Status: DISCONTINUED | OUTPATIENT
Start: 2017-04-10 | End: 2017-04-10

## 2017-04-10 RX ORDER — FENTANYL CITRATE 50 UG/ML
25-50 INJECTION, SOLUTION INTRAMUSCULAR; INTRAVENOUS
Status: DISCONTINUED | OUTPATIENT
Start: 2017-04-10 | End: 2017-04-10

## 2017-04-10 RX ORDER — AMOXICILLIN 250 MG
1 CAPSULE ORAL 2 TIMES DAILY
Status: DISCONTINUED | OUTPATIENT
Start: 2017-04-10 | End: 2017-04-12 | Stop reason: HOSPADM

## 2017-04-10 RX ORDER — FENTANYL CITRATE 50 UG/ML
25-50 INJECTION, SOLUTION INTRAMUSCULAR; INTRAVENOUS EVERY 5 MIN PRN
Status: DISCONTINUED | OUTPATIENT
Start: 2017-04-10 | End: 2017-04-10 | Stop reason: HOSPADM

## 2017-04-10 RX ORDER — POTASSIUM CHLORIDE 1500 MG/1
20-40 TABLET, EXTENDED RELEASE ORAL
Status: DISCONTINUED | OUTPATIENT
Start: 2017-04-10 | End: 2017-04-11

## 2017-04-10 RX ORDER — GLYCOPYRROLATE 0.2 MG/ML
INJECTION, SOLUTION INTRAMUSCULAR; INTRAVENOUS PRN
Status: DISCONTINUED | OUTPATIENT
Start: 2017-04-10 | End: 2017-04-10

## 2017-04-10 RX ORDER — CEFAZOLIN SODIUM 2 G/100ML
2 INJECTION, SOLUTION INTRAVENOUS
Status: COMPLETED | OUTPATIENT
Start: 2017-04-10 | End: 2017-04-10

## 2017-04-10 RX ORDER — ONDANSETRON 4 MG/1
4 TABLET, ORALLY DISINTEGRATING ORAL EVERY 6 HOURS PRN
Status: DISCONTINUED | OUTPATIENT
Start: 2017-04-10 | End: 2017-04-12 | Stop reason: HOSPADM

## 2017-04-10 RX ORDER — POTASSIUM CL/LIDO/0.9 % NACL 10MEQ/0.1L
10 INTRAVENOUS SOLUTION, PIGGYBACK (ML) INTRAVENOUS
Status: DISCONTINUED | OUTPATIENT
Start: 2017-04-10 | End: 2017-04-11

## 2017-04-10 RX ORDER — HYDROCODONE BITARTRATE AND ACETAMINOPHEN 5; 325 MG/1; MG/1
1 TABLET ORAL EVERY 4 HOURS PRN
Status: DISCONTINUED | OUTPATIENT
Start: 2017-04-10 | End: 2017-04-10

## 2017-04-10 RX ORDER — KETOROLAC TROMETHAMINE 15 MG/ML
15 INJECTION, SOLUTION INTRAMUSCULAR; INTRAVENOUS ONCE
Status: COMPLETED | OUTPATIENT
Start: 2017-04-10 | End: 2017-04-10

## 2017-04-10 RX ORDER — VENLAFAXINE 75 MG/1
75 TABLET ORAL
Status: DISCONTINUED | OUTPATIENT
Start: 2017-04-10 | End: 2017-04-12 | Stop reason: HOSPADM

## 2017-04-10 RX ORDER — ONDANSETRON 2 MG/ML
4 INJECTION INTRAMUSCULAR; INTRAVENOUS EVERY 30 MIN PRN
Status: DISCONTINUED | OUTPATIENT
Start: 2017-04-10 | End: 2017-04-10

## 2017-04-10 RX ORDER — LISINOPRIL 40 MG/1
40 TABLET ORAL AT BEDTIME
Status: DISCONTINUED | OUTPATIENT
Start: 2017-04-10 | End: 2017-04-11

## 2017-04-10 RX ORDER — PHYSOSTIGMINE SALICYLATE 1 MG/ML
1.2 INJECTION INTRAVENOUS
Status: DISCONTINUED | OUTPATIENT
Start: 2017-04-10 | End: 2017-04-10

## 2017-04-10 RX ORDER — AMLODIPINE BESYLATE 2.5 MG/1
2.5 TABLET ORAL DAILY
Status: DISCONTINUED | OUTPATIENT
Start: 2017-04-10 | End: 2017-04-12 | Stop reason: HOSPADM

## 2017-04-10 RX ADMIN — HYDROMORPHONE HYDROCHLORIDE 0.2 MG: 1 INJECTION, SOLUTION INTRAMUSCULAR; INTRAVENOUS; SUBCUTANEOUS at 17:36

## 2017-04-10 RX ADMIN — FENTANYL CITRATE 50 MCG: 50 INJECTION, SOLUTION INTRAMUSCULAR; INTRAVENOUS at 12:27

## 2017-04-10 RX ADMIN — ROCURONIUM BROMIDE 10 MG: 10 INJECTION INTRAVENOUS at 10:51

## 2017-04-10 RX ADMIN — GLYCOPYRROLATE 0.4 MG: 0.2 INJECTION, SOLUTION INTRAMUSCULAR; INTRAVENOUS at 11:55

## 2017-04-10 RX ADMIN — VENLAFAXINE HYDROCHLORIDE 75 MG: 75 TABLET ORAL at 18:34

## 2017-04-10 RX ADMIN — ROCURONIUM BROMIDE 30 MG: 10 INJECTION INTRAVENOUS at 10:29

## 2017-04-10 RX ADMIN — LIDOCAINE HYDROCHLORIDE 50 MG: 20 INJECTION, SOLUTION INFILTRATION; PERINEURAL at 10:29

## 2017-04-10 RX ADMIN — PHENYLEPHRINE HYDROCHLORIDE 100 MCG: 10 INJECTION, SOLUTION INTRAMUSCULAR; INTRAVENOUS; SUBCUTANEOUS at 11:07

## 2017-04-10 RX ADMIN — FENTANYL CITRATE 25 MCG: 50 INJECTION, SOLUTION INTRAMUSCULAR; INTRAVENOUS at 11:55

## 2017-04-10 RX ADMIN — LISINOPRIL 40 MG: 40 TABLET ORAL at 20:54

## 2017-04-10 RX ADMIN — ONDANSETRON 4 MG: 2 SOLUTION INTRAMUSCULAR; INTRAVENOUS at 12:55

## 2017-04-10 RX ADMIN — FENTANYL CITRATE 50 MCG: 50 INJECTION, SOLUTION INTRAMUSCULAR; INTRAVENOUS at 10:29

## 2017-04-10 RX ADMIN — HYDROMORPHONE HYDROCHLORIDE 0.5 MG: 1 INJECTION, SOLUTION INTRAMUSCULAR; INTRAVENOUS; SUBCUTANEOUS at 12:38

## 2017-04-10 RX ADMIN — POTASSIUM CHLORIDE, DEXTROSE MONOHYDRATE AND SODIUM CHLORIDE: 150; 5; 450 INJECTION, SOLUTION INTRAVENOUS at 20:52

## 2017-04-10 RX ADMIN — FENTANYL CITRATE 25 MCG: 50 INJECTION, SOLUTION INTRAMUSCULAR; INTRAVENOUS at 11:16

## 2017-04-10 RX ADMIN — HYDROCODONE BITARTRATE AND ACETAMINOPHEN 1 TABLET: 5; 325 TABLET ORAL at 16:11

## 2017-04-10 RX ADMIN — RANITIDINE 150 MG: 150 TABLET ORAL at 20:54

## 2017-04-10 RX ADMIN — DEXAMETHASONE SODIUM PHOSPHATE 4 MG: 4 INJECTION, SOLUTION INTRAMUSCULAR; INTRAVENOUS at 10:29

## 2017-04-10 RX ADMIN — SODIUM CHLORIDE, POTASSIUM CHLORIDE, SODIUM LACTATE AND CALCIUM CHLORIDE: 600; 310; 30; 20 INJECTION, SOLUTION INTRAVENOUS at 12:05

## 2017-04-10 RX ADMIN — KETOROLAC TROMETHAMINE 15 MG: 15 INJECTION, SOLUTION INTRAMUSCULAR; INTRAVENOUS at 18:34

## 2017-04-10 RX ADMIN — PROPOFOL 150 MG: 10 INJECTION, EMULSION INTRAVENOUS at 10:29

## 2017-04-10 RX ADMIN — BUPIVACAINE HYDROCHLORIDE 30 ML: 5 INJECTION, SOLUTION PERINEURAL at 11:55

## 2017-04-10 RX ADMIN — ONDANSETRON 4 MG: 2 INJECTION INTRAMUSCULAR; INTRAVENOUS at 10:29

## 2017-04-10 RX ADMIN — PHENYLEPHRINE HYDROCHLORIDE 100 MCG: 10 INJECTION, SOLUTION INTRAMUSCULAR; INTRAVENOUS; SUBCUTANEOUS at 11:06

## 2017-04-10 RX ADMIN — HYDROCODONE BITARTRATE AND ACETAMINOPHEN 2 TABLET: 5; 325 TABLET ORAL at 21:00

## 2017-04-10 RX ADMIN — POTASSIUM CHLORIDE 40 MEQ: 1500 TABLET, EXTENDED RELEASE ORAL at 20:54

## 2017-04-10 RX ADMIN — SODIUM CHLORIDE, POTASSIUM CHLORIDE, SODIUM LACTATE AND CALCIUM CHLORIDE: 600; 310; 30; 20 INJECTION, SOLUTION INTRAVENOUS at 10:26

## 2017-04-10 RX ADMIN — HYDROMORPHONE HYDROCHLORIDE 0.2 MG: 1 INJECTION, SOLUTION INTRAMUSCULAR; INTRAVENOUS; SUBCUTANEOUS at 14:10

## 2017-04-10 RX ADMIN — SENNOSIDES AND DOCUSATE SODIUM 1 TABLET: 8.6; 5 TABLET ORAL at 20:55

## 2017-04-10 RX ADMIN — POTASSIUM CHLORIDE 20 MEQ: 1500 TABLET, EXTENDED RELEASE ORAL at 22:38

## 2017-04-10 RX ADMIN — CEFAZOLIN SODIUM 2 G: 2 INJECTION, SOLUTION INTRAVENOUS at 10:32

## 2017-04-10 RX ADMIN — NEOSTIGMINE METHYLSULFATE 3 MG: 1 INJECTION INTRAMUSCULAR; INTRAVENOUS; SUBCUTANEOUS at 11:55

## 2017-04-10 NOTE — ANESTHESIA POSTPROCEDURE EVALUATION
Patient: Olivia PEREZ Michael    Procedure(s):  LAPAROSCOPIC  INCISIONAL HERNIA REPAIR WITH MESH - Wound Class: I-Clean    Diagnosis:INCISIONAL HERNIA  Diagnosis Additional Information: No value filed.    Anesthesia Type:  General    Note:  Anesthesia Post Evaluation    Patient location during evaluation: PACU  Patient participation: Able to fully participate in evaluation  Level of consciousness: awake  Pain management: adequate  Airway patency: patent  Cardiovascular status: acceptable  Respiratory status: acceptable  Hydration status: acceptable  PONV: controlled     Anesthetic complications: None          Last vitals:  Vitals:    04/10/17 0948 04/10/17 1211 04/10/17 1230   BP: 175/82 152/72 (!) 160/92   Pulse: 84     Resp: 18 16 16   Temp: 36.4  C (97.5  F) 36.1  C (97  F)    SpO2: 96% 97% 100%         Electronically Signed By: Harsh Cunningham MD  April 10, 2017  12:43 PM

## 2017-04-10 NOTE — ANESTHESIA PREPROCEDURE EVALUATION
Anesthesia Evaluation     . Pt has had prior anesthetic. Type: General           ROS/MED HX    ENT/Pulmonary:       Neurologic:       Cardiovascular:     (+) hypertension--CAD, --. : . . . :. .       METS/Exercise Tolerance:     Hematologic:     (+) Anemia, -      Musculoskeletal:   (+) arthritis, , , -       GI/Hepatic:         Renal/Genitourinary:         Endo:         Psychiatric:     (+) psychiatric history anxiety and depression      Infectious Disease:         Malignancy:   (+) Malignancy History of Breast          Other:                                    Anesthesia Plan      History & Physical Review  History and physical reviewed and following examination; no interval change.    ASA Status:  3 .        Plan for General with Intravenous induction. Maintenance will be TIVA.    PONV prophylaxis:  Ondansetron (or other 5HT-3) and Dexamethasone or Solumedrol  Propofol, Zofran, and decadron      Postoperative Care  Postoperative pain management:  IV analgesics and Oral pain medications.      Consents  Anesthetic plan, risks, benefits and alternatives discussed with:  Patient..                          .

## 2017-04-10 NOTE — IP AVS SNAPSHOT
MRN:[id number]                      After Visit Summary   4/10/2017    Olivia Rodriguez    MRN: [id number]           Thank you!     Thank you for choosing [facility] for your care. Our goal is always to provide you with excellent care. Hearing back from our patients is one way we can continue to improve our services. Please take a few minutes to complete the written survey that you may receive in the mail after you visit with us. Thank you!        Patient Information     Date Of Birth          1945        Designated Caregiver       Most Recent Value    Caregiver    Will someone help with your care after discharge? yes    Name of designated caregiver Alejandro     Phone number of caregiver       About your hospital stay     You were admitted on:  April 10, 2017 You last received care in the:  [place] [address] [facility]    You were discharged on:  April 12, 2017        Reason for your hospital stay       S/p laparoscopic ventral hernia repair                  Who to Call     For medical emergencies, please call 911.  For non-urgent questions about your medical care, please call your primary care provider or clinic, [phone]  For questions related to your surgery, please call your surgery clinic        Attending Provider     Provider Specialty    Dayanara Kendall MD Surgery       Primary Care Provider Office Phone # Fax #    Ame GIANCARLO Villalobos [phone]       [facility]    [address] [facility] [address]  [facility] [address]        After Care Instructions     Activity       Your activity upon discharge: activity as tolerated, no lifting >15lbs for 4 weeks.            Diet       Follow this diet upon discharge: Orders Placed This Encounter      Regular Diet Adult            Diet Instructions       Resume pre-procedure diet            Discharge Instructions       Follow up with Dr. Kendall in 2 weeks. Call [phone] to schedule an  appointment or if you have any concerns.            Discharge Instructions       If no bowel movement by Friday, April 14 - pt should call clinic for Rx for magnesium citrate. [phone]            Dressing       keep wound(s) clean and dry. You may shower, but do not soak incisions x 2 weeks. Leave steri strips in place, they will fall off on their own. Apply dry dressing as needed.            Ice to affected area       Ice to operative site PRN            No lifting        activity as tolerated. No heavy lifting > 20 lbs or strenuous exercise x 3-4 weeks. No driving or alcohol while on pain meds.                  Follow-up Appointments     Follow-up and recommended labs and tests        Follow up with primary care provider, Ame Villalobos, within 7 days to evaluate after surgery.  The following labs/tests are recommended: BMP, CBC.    Follow up with Dayanara benton, within 2 weeks. to evaluate after surgery.  No follow up labs or test are needed.                  Your next 10 appointments already scheduled     Jun 21, 2017  3:45 PM CDT   Office Visit with Hilary Ledezma,    [facility]    [address] [facility] [id number] [phone]           Bring a current list of meds and any records pertaining to this visit.  For Physicals, please bring immunization records and any forms needing to be filled out.  Please arrive 10 minutes early to complete paperwork.              Pending Results     Date and Time Order Name Status Description    4/11/2017 1119 EKG 12-lead, tracing only Preliminary             Statement of Approval     Ordered          04/12/17 0845  I have reviewed and agree with all the recommendations and orders detailed in this document.  EFFECTIVE NOW     Approved and electronically signed by:  Jerri James PA-C             Admission Information     Date & Time Provider Department Dept. Phone    4/10/2017 Dayanara Kendall MD [place]  "[place] [address] [facility] [phone]      Your Vitals Were     Blood Pressure Pulse Temperature Respirations Height Weight    137/74 (BP Location: Right arm) 95 98.2  F (36.8  C) (Oral) 16 1.626 m (5' 4\") 72.7 kg (160 lb 4.8 oz)    Last Period Pulse Oximetry BMI (Body Mass Index)             09/01/2000 91% 27.52 kg/m2         [facility] Information     [facility] gives you secure access to your electronic health record. If you see a primary care provider, you can also send messages to your care team and make appointments. If you have questions, please call your primary care clinic.  If you do not have a primary care provider, please call [phone] and they will assist you.        Care EveryWhere ID     This is your Care EveryWhere ID. This could be used by other organizations to access your [facility] medical records  [id number]           Review of your medicines      START taking        Dose / Directions    HYDROcodone-acetaminophen 5-325 MG per tablet   Commonly known as:  NORCO        Dose:  1-2 tablet   Take 1-2 tablets by mouth every 4 hours as needed for moderate to severe pain   Quantity:  30 tablet   Refills:  0       senna-docusate 8.6-50 MG per tablet   Commonly known as:  SENOKOT-S;PERICOLACE        Dose:  1 tablet   Take 1 tablet by mouth 2 times daily   Quantity:  30 tablet   Refills:  0         CONTINUE these medicines which have NOT CHANGED        Dose / Directions    ALLEGRA PO        Dose:  1 tablet   Take 1 tablet by mouth daily   Refills:  0       amLODIPine 2.5 MG tablet   Commonly known as:  NORVASC   Used for:  Essential hypertension        Dose:  2.5 mg   Take 1 tablet (2.5 mg) by mouth daily   Quantity:  90 tablet   Refills:  3       BACTRIM DS PO        Dose:  0.5 tablet   Take 0.5 tablets by mouth daily as needed (Take 0.5 tablet after intercourse to prevent urinary tract infection.)   Refills:  0       EFFEXOR XR PO        Dose:  225 mg   Take 225 mg by mouth daily (with breakfast) " (Patient takes 3 x 75 mg = 225 mg dose)   Refills:  0       EXCEDRIN PO        Dose:  1 tablet   Take 1 tablet by mouth every morning (for morning headache)   Refills:  0       hydrochlorothiazide 25 MG tablet   Commonly known as:  HYDRODIURIL   Used for:  Essential hypertension        Dose:  25 mg   Take 1 tablet (25 mg) by mouth daily   Quantity:  90 tablet   Refills:  1       NONFORMULARY        Dose:  1 drop   Apply 1 drop to eye daily as needed (Dry eyes.) (Compounded eye drop.) ([facility])   Refills:  0       polyethylene glycol powder   Commonly known as:  MIRALAX   Used for:  Constipation        Dose:  1 capful   Take 17 g by mouth daily as needed   Quantity:  510 g   Refills:  1       PRAVACHOL PO        Dose:  40 mg   Take 40 mg by mouth At Bedtime   Refills:  0       PRINIVIL PO        Dose:  40 mg   Take 40 mg by mouth At Bedtime   Refills:  0         STOP taking     TYLENOL PO                Where to get your medicines      These medications were sent to [facility]  [place] - [address]  [address] [facility] [address], [place] [address]     Phone:  [phone]     senna-docusate 8.6-50 MG per tablet         Some of these will need a paper prescription and others can be bought over the counter. Ask your nurse if you have questions.     Bring a paper prescription for each of these medications     HYDROcodone-acetaminophen 5-325 MG per tablet                Protect others around you: Learn how to safely use, store and throw away your medicines at www.disposemymeds.org.             Medication List: This is a list of all your medications and when to take them. Check marks below indicate your daily home schedule. Keep this list as a reference.      Medications           Morning Afternoon Evening Bedtime As Needed    ALLEGRA PO   Take 1 tablet by mouth daily                                   amLODIPine 2.5 MG tablet   Commonly known as:  NORVASC   Take 1 tablet (2.5 mg) by mouth  daily   Last time this was given:  2.5 mg on 4/12/2017  9:05 AM                                   BACTRIM DS PO   Take 0.5 tablets by mouth daily as needed (Take 0.5 tablet after intercourse to prevent urinary tract infection.)                                      EFFEXOR XR PO   Take 225 mg by mouth daily (with breakfast) (Patient takes 3 x 75 mg = 225 mg dose)                                         EXCEDRIN PO   Take 1 tablet by mouth every morning (for morning headache)                                   hydrochlorothiazide 25 MG tablet   Commonly known as:  HYDRODIURIL   Take 1 tablet (25 mg) by mouth daily   Last time this was given:  25 mg on 4/11/2017  8:11 AM                                   HYDROcodone-acetaminophen 5-325 MG per tablet   Commonly known as:  NORCO   Take 1-2 tablets by mouth every 4 hours as needed for moderate to severe pain   Last time this was given:  1 tablet on 4/12/2017  4:26 AM                                   NONFORMULARY   Apply 1 drop to eye daily as needed (Dry eyes.) (Compounded eye drop.) ([facility])                                   polyethylene glycol powder   Commonly known as:  MIRALAX   Take 17 g by mouth daily as needed                                   PRAVACHOL PO   Take 40 mg by mouth At Bedtime                                   PRINIVIL PO   Take 40 mg by mouth At Bedtime   Last time this was given:  40 mg on 4/10/2017  8:54 PM                                   senna-docusate 8.6-50 MG per tablet   Commonly known as:  SENOKOT-S;PERICOLACE   Take 1 tablet by mouth 2 times daily   Last time this was given:  1 tablet on 4/12/2017  9:08 AM

## 2017-04-10 NOTE — PROGRESS NOTES
Attempted to get pt up to void and pt vasovagal.  BP 95/58 hr 95.  Pt diaphoretic and clammy.  Laid pt back down in bed.  Pt feeling better.

## 2017-04-10 NOTE — OP NOTE
General Surgery Operative Note    PREOPERATIVE DIAGNOSIS: incisional ventral hernia    POSTOPERATIVE DIAGNOSIS: Incisional ventral hernia    PROCEDURE: Laparoscopic ventral hernia repair with Symbotex mesh    SURGEON:  Dayanara Kendall MD    ASSISTANT:  Jerri James PA-C  The Physician Assistant was medically necessary to assist in prepping, positioning, camera operation, retraction/exposure and closure of port site    ANESTHESIA:  General.    BLOOD LOSS: 10ml    FINDINGS: 1.5cm fascial defect on lower midline incision    INDICATIONS:   Ms. Rodriguez presented with abdominal pain associated with a lower abdominal midline incision from a hysterectomy in the past. She had been seen in 2014 and surgery was recommended however she chose to wait as it was not very symptomatic. It has been more symptomatic lately and after a discussion of the risks, benefits, indications and alternatives she elected to proceed with repair.     DETAILS OF PROCEDURE: The patient was brought to the operating room per Anesthesia, placed in supine position, and intubated without difficulty. A prasad catheter was placed using sterile technique. A Surgical timeout was then performed, verifying the correct surgeon, site, procedure, and patient and all in the room were in agreement.     A 1 cm incision was made in the left upper quadrant.  A 5 mm 0 degree laparoscope was then used in conjunction with the support to obtain abdominal access to the skin incision.  The insufflation was set to 15 mmHg.  A 5 mm 30 degree laparoscope was then inserted into the abdomen and the abdomen surveyed.   We placed a second 5 mm port in the left lower quadrant and a 12 mm port above this in the left lateral abdomen.  There were a significant amount of greater omental adhesions to the undersurface of the abdominal wall in the lower abdomen.   The harmonic device was used to divide omental tissue that was adherent to the undersurface of the abdominal wall. We were then  able to see the fascial defect and omentum was reduced completely. The fascial defect was 1.5cm.  I was able to identify the sigmoid colon which was in close proximity to the fascial defect. The peritoneal attachment to the sigmoid colon was carefully taken down to provide enough space for intraabdominal mesh. Once the omentum was reduced we then primarily repaired the 1.5 cm fascial defect using an 0 Vicryl with a Malik-Henry and close it in a figure-of-eight fashion.  We then placed 9cm Symbotex mesh through the 12 mm port site after having had placed an 0 Vicryl suture in the middle of the mesh.  With the mesh intra-abdominally and a Malik-Henry was placed through the prior fascial repair.  This was used to grasp the suture on the mesh.  This was pulled extracorporeally and tied.  There was excellent coverage of the fascial defect with the mesh.    The mesh was in excellent position and the sutures were tied down.  We then used an absorbable tacking device to tack the mesh circumferentially.   The 12 mm port site fascia was then closed with multiple 0 Vicryl sutures using the Malik-Henry device.  Skin was closed using 4-0 Monocryl.  Local was injected into all the incisions and the incisions were covered with Steri-Strips and Band-Aids.  Instrument and sponge counts were correct at the conclusion of the procedure.  Patient was transferred to the PACU in stable condition.    Dayanara Kendall MD

## 2017-04-10 NOTE — PLAN OF CARE
Problem: Goal Outcome Summary  Goal: Goal Outcome Summary  Outcome: No Change  Pt arrived via cart from [facility] at 1400.  VSS, afebrile.  2LNC-94% .  Lap sites CDI. Pain at a 6, IV dilaudid given.

## 2017-04-10 NOTE — BRIEF OP NOTE
General Surgery Brief Operative Note    Pre-operative diagnosis: INCISIONAL HERNIA   Post-operative diagnosis Same   Procedure: Procedure(s):  LAPAROSCOPIC  INCISIONAL HERNIA REPAIR WITH MESH with lysis of adhesions- Wound Class: I-Clean    Surgeon(s), Assistant(s): Surgeon(s) and Role:     * Dayanara Kendall MD - Primary     * Jerri James PA-C - Assisting   Estimated blood loss: 10 mL   Drains: None   Specimens: * No specimens in log *   Findings: See postop diagnosis   Condition: Stable   Comments:      Jerri James PA-C See dictated operative report for full details

## 2017-04-10 NOTE — PROGRESS NOTES
Admission medication history interview status for the 4/10/2017  admission is complete. See EPIC admission navigator for prior to admission medications     Medication history source reliability:Good     Medication history interview source(s):    Medication history resources (including written lists, pill bottles, clinic record):Patient brought in a med list on day of procedure.    Primary pharmacy.[facility] [place]    Additional medication history information not noted on PTA med list :Called [facility] and [facility] on Effexor strength and dose.    Time spent in this activity: 60 minutes    Prior to Admission medications    Medication Sig Last Dose Taking? Auth Provider   Lisinopril (PRINIVIL PO) Take 40 mg by mouth At Bedtime 4/8/2017 at hs Yes Reported, Patient   Pravastatin Sodium (PRAVACHOL PO) Take 40 mg by mouth At Bedtime 4/8/2017 at hs Yes Reported, Patient   Sulfamethoxazole-Trimethoprim (BACTRIM DS PO) Take 0.5 tablets by mouth daily as needed (Take 0.5 tablet after intercourse to prevent urinary tract infection.) 4/9/2017 at am Yes Reported, Patient   Venlafaxine HCl (EFFEXOR XR PO) Take 225 mg by mouth daily (with breakfast) (Patient takes 3 x 75 mg = 225 mg dose) 4/9/2017 at 0830 Yes Reported, Patient   Aspirin-Acetaminophen-Caffeine (EXCEDRIN PO) Take 1 tablet by mouth every morning (for morning headache) 4/7/2017 at am Yes Reported, Patient   Acetaminophen (TYLENOL PO) Take 325 mg by mouth every morning 4/9/2017 at am Yes Reported, Patient   Fexofenadine HCl (ALLEGRA PO) Take 1 tablet by mouth daily 4/8/2017 at am Yes Reported, Patient   NONFORMULARY Apply 1 drop to eye daily as needed (Dry eyes.) (Compounded eye drop.) ([facility]) 4/10/2017 at 0800 Yes Reported, Patient   amLODIPine (NORVASC) 2.5 MG tablet Take 1 tablet (2.5 mg) by mouth daily 4/9/2017 at 0830 Yes Ame Villalobos MD   hydrochlorothiazide (HYDRODIURIL) 25 MG tablet Take 1 tablet (25 mg)  by mouth daily 4/9/2017 at 0830 Yes Ame Villalobos MD   polyethylene glycol (MIRALAX) powder Take 17 g by mouth daily as needed 4/8/2017 at prn Yes Ame Villalobos MD

## 2017-04-10 NOTE — ANESTHESIA CARE TRANSFER NOTE
Patient: Olivia PEREZ Michael    Procedure(s):  LAPAROSCOPIC  INCISIONAL HERNIA REPAIR WITH MESH - Wound Class: I-Clean    Diagnosis: INCISIONAL HERNIA  Diagnosis Additional Information: No value filed.    Anesthesia Type:   General     Note:  Airway :Face Mask  Patient transferred to:PACU        Vitals: (Last set prior to Anesthesia Care Transfer)    CRNA VITALS  4/10/2017 1138 - 4/10/2017 1217      4/10/2017             Pulse: 84    SpO2: 95 %    Resp Rate (set): 10                Electronically Signed By: CARY Wong CRNA  April 10, 2017  12:17 PM

## 2017-04-10 NOTE — IP AVS SNAPSHOT
[facility] [address] [facility]    [address] [place] [address]    Phone:  [phone]                                       After Visit Summary   4/10/2017    Olivia Rodriguez    MRN: [id number]           After Visit Summary Signature Page     I have received my discharge instructions, and my questions have been answered. I have discussed any challenges I see with this plan with the nurse or doctor.    ..........................................................................................................................................  Patient/Patient Representative Signature      ..........................................................................................................................................  Patient Representative Print Name and Relationship to Patient    ..................................................               ................................................  Date                                            Time    ..........................................................................................................................................  Reviewed by Signature/Title    ...................................................              ..............................................  Date                                                            Time

## 2017-04-11 LAB
ANION GAP SERPL CALCULATED.3IONS-SCNC: 5 MMOL/L (ref 3–14)
ANION GAP SERPL CALCULATED.3IONS-SCNC: 7 MMOL/L (ref 3–14)
ANION GAP SERPL CALCULATED.3IONS-SCNC: 8 MMOL/L (ref 3–14)
BUN SERPL-MCNC: 26 MG/DL (ref 7–30)
BUN SERPL-MCNC: 27 MG/DL (ref 7–30)
BUN SERPL-MCNC: 28 MG/DL (ref 7–30)
CALCIUM SERPL-MCNC: 8.6 MG/DL (ref 8.5–10.1)
CALCIUM SERPL-MCNC: 8.8 MG/DL (ref 8.5–10.1)
CALCIUM SERPL-MCNC: 8.8 MG/DL (ref 8.5–10.1)
CHLORIDE SERPL-SCNC: 102 MMOL/L (ref 94–109)
CHLORIDE SERPL-SCNC: 103 MMOL/L (ref 94–109)
CHLORIDE SERPL-SCNC: 103 MMOL/L (ref 94–109)
CO2 SERPL-SCNC: 26 MMOL/L (ref 20–32)
CO2 SERPL-SCNC: 27 MMOL/L (ref 20–32)
CO2 SERPL-SCNC: 27 MMOL/L (ref 20–32)
CREAT SERPL-MCNC: 1.09 MG/DL (ref 0.52–1.04)
CREAT SERPL-MCNC: 1.19 MG/DL (ref 0.52–1.04)
CREAT SERPL-MCNC: 1.27 MG/DL (ref 0.52–1.04)
ERYTHROCYTE [DISTWIDTH] IN BLOOD BY AUTOMATED COUNT: 15 % (ref 10–15)
GFR SERPL CREATININE-BSD FRML MDRD: 41 ML/MIN/1.7M2
GFR SERPL CREATININE-BSD FRML MDRD: 44 ML/MIN/1.7M2
GFR SERPL CREATININE-BSD FRML MDRD: 49 ML/MIN/1.7M2
GLUCOSE SERPL-MCNC: 115 MG/DL (ref 70–99)
GLUCOSE SERPL-MCNC: 117 MG/DL (ref 70–99)
GLUCOSE SERPL-MCNC: 142 MG/DL (ref 70–99)
HCT VFR BLD AUTO: 26.9 % (ref 35–47)
HGB BLD-MCNC: 7.5 G/DL (ref 11.7–15.7)
HGB BLD-MCNC: 8.8 G/DL (ref 11.7–15.7)
MCH RBC QN AUTO: 29.9 PG (ref 26.5–33)
MCHC RBC AUTO-ENTMCNC: 32.7 G/DL (ref 31.5–36.5)
MCV RBC AUTO: 92 FL (ref 78–100)
PLATELET # BLD AUTO: 290 10E9/L (ref 150–450)
POTASSIUM SERPL-SCNC: 4.4 MMOL/L (ref 3.4–5.3)
POTASSIUM SERPL-SCNC: 5 MMOL/L (ref 3.4–5.3)
POTASSIUM SERPL-SCNC: 5.4 MMOL/L (ref 3.4–5.3)
POTASSIUM SERPL-SCNC: 6.4 MMOL/L (ref 3.4–5.3)
RBC # BLD AUTO: 2.94 10E12/L (ref 3.8–5.2)
SODIUM SERPL-SCNC: 135 MMOL/L (ref 133–144)
SODIUM SERPL-SCNC: 135 MMOL/L (ref 133–144)
SODIUM SERPL-SCNC: 138 MMOL/L (ref 133–144)
WBC # BLD AUTO: 10.8 10E9/L (ref 4–11)

## 2017-04-11 PROCEDURE — 93010 ELECTROCARDIOGRAM REPORT: CPT | Performed by: INTERNAL MEDICINE

## 2017-04-11 PROCEDURE — 36415 COLL VENOUS BLD VENIPUNCTURE: CPT | Performed by: PHYSICIAN ASSISTANT

## 2017-04-11 PROCEDURE — 25000132 ZZH RX MED GY IP 250 OP 250 PS 637: Performed by: PHYSICIAN ASSISTANT

## 2017-04-11 PROCEDURE — 80048 BASIC METABOLIC PNL TOTAL CA: CPT | Performed by: PHYSICIAN ASSISTANT

## 2017-04-11 PROCEDURE — 25000132 ZZH RX MED GY IP 250 OP 250 PS 637: Performed by: SURGERY

## 2017-04-11 PROCEDURE — 40000275 ZZH STATISTIC RCP TIME EA 10 MIN

## 2017-04-11 PROCEDURE — 80048 BASIC METABOLIC PNL TOTAL CA: CPT | Performed by: SURGERY

## 2017-04-11 PROCEDURE — 25000131 ZZH RX MED GY IP 250 OP 636 PS 637: Performed by: SURGERY

## 2017-04-11 PROCEDURE — 99220 ZZC INITIAL OBSERVATION CARE,LEVL III: CPT | Performed by: INTERNAL MEDICINE

## 2017-04-11 PROCEDURE — 99207 ZZC APP CREDIT; MD BILLING SHARED VISIT: CPT | Performed by: PHYSICIAN ASSISTANT

## 2017-04-11 PROCEDURE — 25800025 ZZH RX 258: Performed by: SURGERY

## 2017-04-11 PROCEDURE — 85018 HEMOGLOBIN: CPT | Performed by: SURGERY

## 2017-04-11 PROCEDURE — 36415 COLL VENOUS BLD VENIPUNCTURE: CPT | Performed by: SURGERY

## 2017-04-11 PROCEDURE — 94640 AIRWAY INHALATION TREATMENT: CPT

## 2017-04-11 PROCEDURE — 93005 ELECTROCARDIOGRAM TRACING: CPT

## 2017-04-11 PROCEDURE — 85027 COMPLETE CBC AUTOMATED: CPT | Performed by: SURGERY

## 2017-04-11 PROCEDURE — 99207 ZZC CONSULT E&M CHANGED TO INITIAL LEVEL: CPT | Performed by: INTERNAL MEDICINE

## 2017-04-11 PROCEDURE — 25000125 ZZHC RX 250: Performed by: SURGERY

## 2017-04-11 PROCEDURE — 84132 ASSAY OF SERUM POTASSIUM: CPT | Mod: 91 | Performed by: SURGERY

## 2017-04-11 PROCEDURE — 25000128 H RX IP 250 OP 636: Performed by: PHYSICIAN ASSISTANT

## 2017-04-11 PROCEDURE — 25000128 H RX IP 250 OP 636: Performed by: SURGERY

## 2017-04-11 RX ORDER — HYDROCODONE BITARTRATE AND ACETAMINOPHEN 5; 325 MG/1; MG/1
1-2 TABLET ORAL EVERY 4 HOURS PRN
Qty: 30 TABLET | Refills: 0 | Status: SHIPPED | OUTPATIENT
Start: 2017-04-11 | End: 2017-04-25

## 2017-04-11 RX ORDER — ALBUTEROL SULFATE 0.83 MG/ML
10 SOLUTION RESPIRATORY (INHALATION) ONCE
Status: COMPLETED | OUTPATIENT
Start: 2017-04-11 | End: 2017-04-11

## 2017-04-11 RX ORDER — DEXTROSE MONOHYDRATE 100 MG/ML
INJECTION, SOLUTION INTRAVENOUS CONTINUOUS
Status: ACTIVE | OUTPATIENT
Start: 2017-04-11 | End: 2017-04-11

## 2017-04-11 RX ORDER — SODIUM CHLORIDE 9 MG/ML
INJECTION, SOLUTION INTRAVENOUS CONTINUOUS
Status: DISCONTINUED | OUTPATIENT
Start: 2017-04-11 | End: 2017-04-12

## 2017-04-11 RX ORDER — DEXTROSE MONOHYDRATE 25 G/50ML
25 INJECTION, SOLUTION INTRAVENOUS ONCE
Status: COMPLETED | OUTPATIENT
Start: 2017-04-11 | End: 2017-04-11

## 2017-04-11 RX ADMIN — VENLAFAXINE HYDROCHLORIDE 75 MG: 75 TABLET ORAL at 08:11

## 2017-04-11 RX ADMIN — HYDROCHLOROTHIAZIDE 25 MG: 25 TABLET ORAL at 08:11

## 2017-04-11 RX ADMIN — HYDROCODONE BITARTRATE AND ACETAMINOPHEN 2 TABLET: 5; 325 TABLET ORAL at 07:16

## 2017-04-11 RX ADMIN — AMLODIPINE BESYLATE 2.5 MG: 2.5 TABLET ORAL at 08:11

## 2017-04-11 RX ADMIN — RANITIDINE 150 MG: 150 TABLET ORAL at 08:11

## 2017-04-11 RX ADMIN — DEXTROSE MONOHYDRATE: 100 INJECTION, SOLUTION INTRAVENOUS at 12:04

## 2017-04-11 RX ADMIN — SENNOSIDES AND DOCUSATE SODIUM 1 TABLET: 8.6; 5 TABLET ORAL at 08:10

## 2017-04-11 RX ADMIN — HYDROCODONE BITARTRATE AND ACETAMINOPHEN 1 TABLET: 5; 325 TABLET ORAL at 16:35

## 2017-04-11 RX ADMIN — SODIUM CHLORIDE 250 ML: 9 INJECTION, SOLUTION INTRAVENOUS at 10:49

## 2017-04-11 RX ADMIN — ENOXAPARIN SODIUM 40 MG: 40 INJECTION SUBCUTANEOUS at 10:49

## 2017-04-11 RX ADMIN — DEXTROSE MONOHYDRATE 25 G: 500 INJECTION PARENTERAL at 11:57

## 2017-04-11 RX ADMIN — SODIUM CHLORIDE 7.5 UNITS: 9 INJECTION, SOLUTION INTRAVENOUS at 11:57

## 2017-04-11 RX ADMIN — SENNOSIDES AND DOCUSATE SODIUM 1 TABLET: 8.6; 5 TABLET ORAL at 20:22

## 2017-04-11 RX ADMIN — ALBUTEROL SULFATE 10 MG: 2.5 SOLUTION RESPIRATORY (INHALATION) at 12:05

## 2017-04-11 RX ADMIN — VENLAFAXINE HYDROCHLORIDE 75 MG: 75 TABLET ORAL at 11:55

## 2017-04-11 RX ADMIN — SODIUM CHLORIDE: 9 INJECTION, SOLUTION INTRAVENOUS at 12:03

## 2017-04-11 RX ADMIN — VENLAFAXINE HYDROCHLORIDE 75 MG: 75 TABLET ORAL at 17:31

## 2017-04-11 RX ADMIN — HYDROCODONE BITARTRATE AND ACETAMINOPHEN 1 TABLET: 5; 325 TABLET ORAL at 21:00

## 2017-04-11 RX ADMIN — SODIUM CHLORIDE: 9 INJECTION, SOLUTION INTRAVENOUS at 22:54

## 2017-04-11 RX ADMIN — SODIUM BICARBONATE 50 MEQ: 84 INJECTION, SOLUTION INTRAVENOUS at 12:04

## 2017-04-11 NOTE — PHARMACY
Pharmacy Consult to review all medications for possible side effect of hyperkalemia    Patient was continued on Lisinopril 40 mg at bedtime (PTA med) which can cause hyperkalemia.  Lisinopril was discontinued by the physician at this time.    Patient received Potassium 60 mEq orally on 4/10.  Prior potassium level was on 4/6 which resulted in a potassium level = 3.3.  Potassium replacement protocol is still ordered at this time to be given based on potassium levels.  May consider discontinuing.    Deanne Degroot, KristopherD

## 2017-04-11 NOTE — PLAN OF CARE
Problem: Goal Outcome Summary  Goal: Goal Outcome Summary  Outcome: Improving  Telemetry sinus rhythm/sinus dysrhythmia. Pain controlled with PO pain medication. Tolerating PO , appetite improving. Spouse at bedside and attentive to patient. Anticipate discharge tomorrow.

## 2017-04-11 NOTE — PLAN OF CARE
Problem: Goal Outcome Summary  Goal: Goal Outcome Summary  Outcome: No Change  A&O x4 with forgetfulness. VSS, tachy. 1L of O2 on overnight. Denied pain. Incisions intact with steri strips. Bladder scan 266, output 50 ml. -BS, - flatus, LS clear. A1 with ambulation.

## 2017-04-11 NOTE — PROGRESS NOTES
Attempted to dangle second time. Pt c/o remarkable pain with sitting up and again became pale and diaphoretic.  Placed pt back in supine position, BP stable in 130's, .   Will continue to monitor.

## 2017-04-11 NOTE — PROGRESS NOTES
"General Surgery Progress Note    Subjective: Pt with a few episodes of lightheadedness overnight. Also with tachycardia. Labs checked at 3am revealed hyperkalemia (after potassium replacement) and increased creatinine. She denies nausea. No emesis. Pain controlled with meds.     Objective: /61 (BP Location: Right arm)  Pulse 111  Temp 98  F (36.7  C) (Oral)  Resp 16  Ht 5' 4\" (1.626 m)  Wt 160 lb 4.8 oz (72.7 kg)  LMP 09/01/2000  SpO2 92%  BMI 27.52 kg/m2  Gen: alert, mildly diaphoretic   CV: RRR  Pulm: nonlabored breathing  Abd: distended, incisions c/d/i, tender to palpation near incisions  Ext: WWP    Assessment/Plan:   Olivia Rodriguez  is a 72 year old female POD 1 s/p laparoscopic ventral hernia repair. Increased pain and distention. Unclear etiology of creatinine elevation - will give small fluid bolus and recheck labs.   - advance diet as tolerated  - pain control  - possibly home later today vs tomorrow  - recheck bmp/cbc     Dayanara Kendall MD  [facility]  [phone]      Addendum: repeat labs revealed worsening hyperkalemia. Will shift now. Hospitalist consult for further assistance with management. Creatinine improved. hgb down from pre-op - will recheck in AM. Plan for one more night in hospital.     Dayanara Kendall MD  [facility]  [phone]                "

## 2017-04-11 NOTE — CONSULTS
[facility]    Hospitalist Consultation    Date of Admission:  4/10/2017  Date of Consult (When I saw the patient): 04/11/17    Assessment & Plan   Olivia Rodriguez is a 72 year old female with PMHx of recurrent UTI, HTN, MICHELLE, TMJ, anxiety, and hx or breast cancer who was admitted on 4/10/2017 and is S/P laparoscopic ventral hernia repair. I was asked to see the patient for hyperkalemia and elevated creatinine. Pt tachycardic, all other vitals WNL. Pt currently stable.     S/P laparoscopic ventral hernia repair: Surgery performed on 4/10 by Dr. Kendall. EBL 10 ccs. Pt tolerated well.   -- Defer routine post-operative cares and pain control to primary service   -- Bowel regimen in place while on narcotics; pt has not had BM since surgery   -- Encourage pulmonary toilet; incentive spirometer at bedside   -- CBC and BMP in AM     Hyperkalemia: Potassium on 4/6/17 was 3.3. Unclear if repeat was performed during surgery, but no evidence of repeat potassium in Epic. Potassium replacement protocol had been ordered so patient received 60 mEq. EBL of 10 ccs recorded, hemoglobin 12.5--8.8. Pt also was receiving HCTZ, Lisinopril, Toradol, and Lovenox. Etiology most likely related to iatrogenic administration of potassium in the setting of volume depletion from surgery with ongoing administration of nephrotoxic PTA medications.   -- Pt given 7.5 U insulin per protocol with dextrose 50% injection and dextrose 10% infusion; also given sodium bicarb per protocol   -- 0.9% NS at 100 mL/hr   -- Potassium recheck q4 hours; repeat potassium 5.0  -- BMP recheck at 1600   -- Monitor on telemetry     EVERETTE: Creatinine PTA 0.83. Creatinine this AM 1.27--1.19  -- IVF as above   -- PTA lisinopril, HCTZ on hold   -- D/C Lovenox and toradol for pain control  -- Avoid nephrotoxic agents     Acute blood loss anemia: Hgb 12.5--8.8. EBL 10 ccs. This is in the setting of surgery as above   -- Continue to monitor     HTN: Continue  PTA Amlodipine.  -- Hold PTA Lisinopril and HCTZ     Anxiety: Continue PTA Effexor    DVT Prophylaxis: Pneumatic Compression Devices and Ambulate every shift. Lovenox d/c 4/11.   Code Status: Full Code    Disposition: Expected discharge tomorrow if potassium and creatinine stable.     Mayra Tyler PA-C    This patient was discussed with Dr. Spivey of the Hospitalist Service who agrees with current plans as outlined above.     Reason for Consult   Reason for consult: I was asked by Dr. Kendall to evaluate this patient for hyperkalemia, elevated creatinine.    Primary Care Physician   Ame Villalobos    Chief Complaint   S/P laparoscopic ventral hernia repair    History is obtained from the patient    History of Present Illness   Olivia Rodriguez is a 72 year old female with PMHx of recurrent UTI, HTN, MICHELLE, TMJ, anxiety, and hx or breast cancer who was admitted on 4/10/2017 and is S/P laparoscopic ventral hernia repair. I was asked to see the patient for hyperkalemia and elevated creatinine. Pt tachycardic, all other vitals WNL. Pt currently stable.     Potassium on 4/6/17 was 3.3. Unclear if repeat was performed during surgery, but no evidence of repeat potassium in Epic. Potassium replacement protocol had been ordered so patient received 60 mEq overnight. EBL of 10 ccs recorded, hemoglobin 12.5--8.8. Pt also was receiving HCTZ, Lisinopril, Toradol, and Lovenox. Etiology most likely related to iatrogenic administration of potassium in the setting of volume depletion from surgery with ongoing administration of nephrotoxic PTA medications.    On my interview, pt is tachycardic, but without palpitations, CP, SOB, abdominal pain, N/V, diaphoresis. Pain at incision site. Has not had BM since surgery, states is not passing gas. Ate about half of her lunch.  accompanies her at bedside.     Past Medical History    1. Recurrent UTI on prophylactic Bactrim   2. Hx of breast cancer, right side   3.  Anxiety   4. HTN   5. Iron deficiency anemia   6. TMJ  7. Memory difficulty; scheduled to see Dr. Ledezma    Past Surgical History   I have reviewed this patient's surgical history and updated it with pertinent information if needed.  Past Surgical History:   Procedure Laterality Date     BIOPSY  13    right breast biopsy     BIOPSY  13    right axillary lymph node     BREAST SURGERY  13    right breast partial mastectomy/lumpectomy      SECTION       COLONOSCOPY      She had colonoscopy and endoscopy done on November 15, 2012 for workup of iron deficiency anemia and the results were satisfactory     GYN SURGERY           HEAD & NECK SURGERY      surgery for Trigeminal neuralgia       HYSTERECTOMY TOTAL ABDOMINAL, BILATERAL SALPINGO-OOPHORECTOMY, COMBINED       LAPAROSCOPIC HERNIORRHAPHY INCISIONAL N/A 4/10/2017    Procedure: LAPAROSCOPIC HERNIORRHAPHY INCISIONAL;  Surgeon: Dayanara Kendall MD;  Location: [facility]     MASTECTOMY PARTIAL WITH SENTINEL NODE  2013    Procedure: MASTECTOMY PARTIAL WITH SENTINEL NODE;  RIGHT PARTIAL MASTECTOMY WITH RIGHT AXILLARY SENTINEL NODE BIOPSY;  Surgeon: Kae Padilla MD;  Location: [facility]     ORTHOPEDIC SURGERY      Right TKA and Left TKA     Prior to Admission Medications   Prior to Admission Medications   Prescriptions Last Dose Informant Patient Reported? Taking?   Acetaminophen (TYLENOL PO) 2017 at am Spouse/Significant Other Yes Yes   Sig: Take 325 mg by mouth every morning   Aspirin-Acetaminophen-Caffeine (EXCEDRIN PO) 2017 at am Spouse/Significant Other Yes Yes   Sig: Take 1 tablet by mouth every morning (for morning headache)   Fexofenadine HCl (ALLEGRA PO) 2017 at am Spouse/Significant Other Yes Yes   Sig: Take 1 tablet by mouth daily   Lisinopril (PRINIVIL PO) 2017 at hs Spouse/Significant Other Yes Yes   Sig: Take 40 mg by mouth At Bedtime   NONFORMULARY 4/10/2017 at 0800 Spouse/Significant Other Yes Yes   Sig: Apply  1 drop to eye daily as needed (Dry eyes.) (Compounded eye drop.) ([facility])   Pravastatin Sodium (PRAVACHOL PO) 4/8/2017 at hs Spouse/Significant Other Yes Yes   Sig: Take 40 mg by mouth At Bedtime   Sulfamethoxazole-Trimethoprim (BACTRIM DS PO) 4/9/2017 at am Spouse/Significant Other Yes Yes   Sig: Take 0.5 tablets by mouth daily as needed (Take 0.5 tablet after intercourse to prevent urinary tract infection.)   Venlafaxine HCl (EFFEXOR XR PO) 4/9/2017 at 0830 Spouse/Significant Other Yes Yes   Sig: Take 225 mg by mouth daily (with breakfast) (Patient takes 3 x 75 mg = 225 mg dose)   amLODIPine (NORVASC) 2.5 MG tablet 4/9/2017 at 0830 Spouse/Significant Other No Yes   Sig: Take 1 tablet (2.5 mg) by mouth daily   hydrochlorothiazide (HYDRODIURIL) 25 MG tablet 4/9/2017 at 0830 Spouse/Significant Other No Yes   Sig: Take 1 tablet (25 mg) by mouth daily   polyethylene glycol (MIRALAX) powder 4/8/2017 at prn Spouse/Significant Other No Yes   Sig: Take 17 g by mouth daily as needed      Facility-Administered Medications: None     Allergies   Allergies   Allergen Reactions     Seafood Anaphylaxis     Femara [Letrozole] Rash     Latex Rash     WITH LATEX BANDAIDS     Tamoxifen Rash     Social History   Lives at home with her . Denies tobacco, alcohol, or illicit drug use.     Family History   I have reviewed this patient's family history and updated it with pertinent information if needed.   Family History   Problem Relation Age of Onset     Cardiovascular Father      Alzheimer Disease Mother      Cancer - colorectal No family hx of      Breast Cancer No family hx of        Review of Systems   The 10 point Review of Systems is negative other than noted in the HPI.    Physical Exam   Temp: 98.4  F (36.9  C) Temp src: Oral BP: 132/55 Pulse: 111 Heart Rate: 79 Resp: 16 SpO2: 98 % O2 Device: None (Room air) Oxygen Delivery: 1 LPM  Vital Signs with Ranges  Temp:  [97.8  F (36.6  C)-98.6  F (37  C)] 98.4  F  (36.9  C)  Pulse:  [] 111  Heart Rate:  [] 79  Resp:  [12-16] 16  BP: ()/(43-82) 132/55  SpO2:  [92 %-98 %] 98 %  160 lbs 4.8 oz    CONSTITUTIONAL: Pt sitting at the edge of the bed with nebulizer on, dressed in hospital garb. Appears comfortable. Cooperative with interview. Accompanied by  at bedside.  HEENT: Normocephalic, atraumatic. Negative for conjunctival redness or scleral icterus.  Oral mucosa pink and moist; negative for ulcerations, erythema, or exudates.    CARDIOVASCULAR: Regular rate, tachycardic. No murmurs, rubs, or extra heart sounds appreciated. Pulses +2/4 and regular in upper and lower extremities, bilaterally.   RESPIRATORY: No increased work of breathing.  CTA, bilat; no wheezes, rales, or rhonchi appreciated.  GASTROINTESTINAL:  Abdomen soft, non-distended. Hypoactive. Negative for tenderness to palpation.  No masses or organomegaly noted.  MUSCULOSKELETAL: No gross deformities noted. Normal muscle tone.   HEMATOLOGIC/LYMPHATIC/IMMUNOLOGIC:  Negative for lower extremity edema, bilaterally.  NEUROLOGIC: Alert and oriented to person, place, and time.  strength intact. No focal neuro deficits appreciated. No facial asymmetry.   SKIN: Warm, dry, intact. No jaundice noted. Negative for suspicious lesions, rashes, bruising, open sores or abrasions.     Data   -Data reviewed today: All pertinent laboratory and imaging results from this encounter were reviewed. I personally reviewed EKG which shows sinus tachycardia with RBBB    Recent Labs  Lab 04/11/17  1145 04/11/17  0855 04/11/17  0250 04/10/17  1500 04/06/17  1531   WBC  --  10.8  --   --  6.3   HGB  --  8.8*  --   --  12.5   MCV  --  92  --   --  90   PLT  --  290  --  320 374   NA  --  135 135  --  138   POTASSIUM 5.0 6.4* 5.4*  --  3.3*   CHLORIDE  --  103 102  --  100   CO2  --  27 26  --  31   BUN  --  28 26  --  31*   CR  --  1.19* 1.27* 0.83 0.83   ANIONGAP  --  5 7  --  7   GUS  --  8.8 8.6  --  9.1   GLC  --   115* 142*  --  85       No results found for this or any previous visit (from the past 24 hour(s)).

## 2017-04-12 VITALS
HEIGHT: 64 IN | RESPIRATION RATE: 16 BRPM | SYSTOLIC BLOOD PRESSURE: 137 MMHG | TEMPERATURE: 98.2 F | WEIGHT: 160.3 LBS | OXYGEN SATURATION: 91 % | BODY MASS INDEX: 27.37 KG/M2 | DIASTOLIC BLOOD PRESSURE: 74 MMHG | HEART RATE: 95 BPM

## 2017-04-12 LAB
ANION GAP SERPL CALCULATED.3IONS-SCNC: 6 MMOL/L (ref 3–14)
BUN SERPL-MCNC: 17 MG/DL (ref 7–30)
CALCIUM SERPL-MCNC: 8.5 MG/DL (ref 8.5–10.1)
CHLORIDE SERPL-SCNC: 103 MMOL/L (ref 94–109)
CO2 SERPL-SCNC: 28 MMOL/L (ref 20–32)
CREAT SERPL-MCNC: 0.72 MG/DL (ref 0.52–1.04)
ERYTHROCYTE [DISTWIDTH] IN BLOOD BY AUTOMATED COUNT: 15.2 % (ref 10–15)
GFR SERPL CREATININE-BSD FRML MDRD: 79 ML/MIN/1.7M2
GLUCOSE SERPL-MCNC: 98 MG/DL (ref 70–99)
HCT VFR BLD AUTO: 25.4 % (ref 35–47)
HGB BLD-MCNC: 8.2 G/DL (ref 11.7–15.7)
MCH RBC QN AUTO: 29.9 PG (ref 26.5–33)
MCHC RBC AUTO-ENTMCNC: 32.3 G/DL (ref 31.5–36.5)
MCV RBC AUTO: 93 FL (ref 78–100)
PLATELET # BLD AUTO: 280 10E9/L (ref 150–450)
POTASSIUM SERPL-SCNC: 4.4 MMOL/L (ref 3.4–5.3)
RBC # BLD AUTO: 2.74 10E12/L (ref 3.8–5.2)
SODIUM SERPL-SCNC: 137 MMOL/L (ref 133–144)
WBC # BLD AUTO: 6.3 10E9/L (ref 4–11)

## 2017-04-12 PROCEDURE — 99225 ZZC SUBSEQUENT OBSERVATION CARE,LEVEL II: CPT | Performed by: INTERNAL MEDICINE

## 2017-04-12 PROCEDURE — 85027 COMPLETE CBC AUTOMATED: CPT | Performed by: SURGERY

## 2017-04-12 PROCEDURE — 25000125 ZZHC RX 250: Performed by: PHYSICIAN ASSISTANT

## 2017-04-12 PROCEDURE — 99207 ZZC CDG-CODE CATEGORY CHANGED: CPT | Performed by: INTERNAL MEDICINE

## 2017-04-12 PROCEDURE — 25000132 ZZH RX MED GY IP 250 OP 250 PS 637: Performed by: PHYSICIAN ASSISTANT

## 2017-04-12 PROCEDURE — 80048 BASIC METABOLIC PNL TOTAL CA: CPT | Performed by: SURGERY

## 2017-04-12 PROCEDURE — 25000132 ZZH RX MED GY IP 250 OP 250 PS 637: Performed by: SURGERY

## 2017-04-12 PROCEDURE — 25000128 H RX IP 250 OP 636: Performed by: PHYSICIAN ASSISTANT

## 2017-04-12 PROCEDURE — 36415 COLL VENOUS BLD VENIPUNCTURE: CPT | Performed by: SURGERY

## 2017-04-12 RX ORDER — AMOXICILLIN 250 MG
1 CAPSULE ORAL 2 TIMES DAILY
Qty: 30 TABLET | Refills: 0 | Status: SHIPPED | OUTPATIENT
Start: 2017-04-12 | End: 2019-08-01

## 2017-04-12 RX ORDER — BISACODYL 10 MG
10 SUPPOSITORY, RECTAL RECTAL ONCE
Status: COMPLETED | OUTPATIENT
Start: 2017-04-12 | End: 2017-04-12

## 2017-04-12 RX ADMIN — HYDROCODONE BITARTRATE AND ACETAMINOPHEN 1 TABLET: 5; 325 TABLET ORAL at 04:26

## 2017-04-12 RX ADMIN — BISACODYL 10 MG: 10 SUPPOSITORY RECTAL at 11:33

## 2017-04-12 RX ADMIN — AMLODIPINE BESYLATE 2.5 MG: 2.5 TABLET ORAL at 09:05

## 2017-04-12 RX ADMIN — PROCHLORPERAZINE EDISYLATE 5 MG: 5 INJECTION INTRAMUSCULAR; INTRAVENOUS at 08:16

## 2017-04-12 RX ADMIN — ACETAMINOPHEN 650 MG: 325 TABLET, FILM COATED ORAL at 02:41

## 2017-04-12 RX ADMIN — HYDROCODONE BITARTRATE AND ACETAMINOPHEN 1 TABLET: 5; 325 TABLET ORAL at 16:08

## 2017-04-12 RX ADMIN — ONDANSETRON 4 MG: 4 TABLET, ORALLY DISINTEGRATING ORAL at 04:44

## 2017-04-12 RX ADMIN — VENLAFAXINE HYDROCHLORIDE 75 MG: 75 TABLET ORAL at 09:05

## 2017-04-12 RX ADMIN — RANITIDINE 150 MG: 150 TABLET ORAL at 09:06

## 2017-04-12 RX ADMIN — SENNOSIDES AND DOCUSATE SODIUM 1 TABLET: 8.6; 5 TABLET ORAL at 09:08

## 2017-04-12 RX ADMIN — VENLAFAXINE HYDROCHLORIDE 75 MG: 75 TABLET ORAL at 12:20

## 2017-04-12 NOTE — PROGRESS NOTES
[facility]  GENERAL SURGERY Progress Note      Date : April 12, 2017  Admission Date: April 10, 2017    Agree with below.  Little nausea this am, advised to go slow with diet.   No flatus, mild abd distention, will try Dulcolax supp x 1.  Slight tachycardia possible due to hgb. Hgb 7.5 last night, re-check pending for this am.  Hyperkalemia improved.   UO adequate.  Possibly home later today if hgb stable otherwise tomorrow and ok with medicine.   Appreciate hospitalist assistance with management of this patient.    Jerri James PA-C    Addendum: agree with above. Pt with mild nausea. Abdomen mildly distended. Improving pain. Passing flatus, no bm. Incisions healing well. Hgb decreased last night - believe 2/2 post operative lovenox and toradol combination. Recheck pending this morning. Creatinine continues to improve.   - regular diet, maybe home later if hgb stable and able to tolerate lunch with minimal nausea  - agree with suppository    Dayanara Kendall MD  [facility]  [phone]              Assessment and Plan:     Olivia Rodriguez is a 72-year-old female with history of memory deficit. S/P Procedure(s): laparoscopic ventral hernia repair with Symbotex mesh; performed by Dr. Dayanara Kendall.      Day Post-Op #2  4/10- Initial poor pain control, increased confusion, vasovagal response x2 with activity   4/11- Tachycardic, hyperkalemia 6.4 --5.0-- 4.4     Plan:   - Norco 1-2 tabs for pain control with Dilaudid IV 0.3-0.5mg for breakthrough pain. Transition to oral pain medication alone.   - Ambulation with assist   - Regular diet   - Anticipate discharge to home setting with . Status improving. Hyperkalemia normalized         Interval History:     Pt states that she feels well and expresses no concerns. States that her pain has improved. Appetite diminished. Sleep adequate. No issues with voiding. No flatus per patient. Eager to return home.        Physical Exam:   Temp: 98.1   F (36.7  C) Temp src: Oral BP: 129/69 Pulse: 110 (monitor) Heart Rate: 99 Resp: 18 SpO2: 91 % O2 Device: None (Room air)     Constitutional:  Awakens easily. NAD. Appears comfortable. Alert, oriented to place, day, purpose.     Abdomen: Soft, mild distention, no guarding, rigidity    Wounds: Lap sites x4 Steri-strips, evidence old blood, appropriately tender at incisions.No erythema or drainage.    Extremities: No edema or calf tenderness. Full DP, TP pulses.       Data:   Intake/Output Summary (Last 24 hours) at 04/12/17 0637  Last data filed at 04/12/17 0400   Gross per 24 hour   Intake             2160 ml   Output             1200 ml   Net              960 ml       Recent Labs  Lab 04/11/17  2115 04/11/17  0855 04/06/17  1531   HGB 7.5* 8.8* 12.5     Carole Gee [place] [facility]

## 2017-04-12 NOTE — PLAN OF CARE
Problem: Goal Outcome Summary  Goal: Goal Outcome Summary  Outcome: Adequate for Discharge Date Met:  04/12/17  A/O, but forgetful. AVSS. Up SBA, walked 2x. Denies pain. Tolerating diet. Lap sites CDI. BS active, +flatus, no BM yet (suppository given this am). Plan to discharge this evening. Continuing to monitor.

## 2017-04-12 NOTE — PLAN OF CARE
Problem: Goal Outcome Summary  Goal: Goal Outcome Summary  Outcome: No Change  VSS, A/O ex forgetful, early stage alzheimer's. Tele sinus tach. Lap incisions c ecchymosis, covered c steri strips, CARLO, abd binder in place. Up to bathroom frequently, asst x1. Flatus +, BS active. LS clear. Lost IV site on Rt hand, IV team placed new IV in Lt anticubital at 0400. IV infusing NS@100. C/O nausea at 0400, sublingual zofran given, aromatherapy applied, joceline well. C/O pain in abdomen, prn Norco and tylenol given throughout night, joceline well. Hgb low at 7.4 on 4/11 at 2115, continue to monitor.

## 2017-04-12 NOTE — PROGRESS NOTES
[facility]    Hospitalist Progress Note    Date of Service (when I saw the patient): 04/12/2017    Assessment & Plan   Olivia Rodriguez is a 72 year old female who was admitted on 4/10/2017 with PMHx of recurrent UTI, HTN, MICHELLE, TMJ, anxiety, and hx or breast cancer who was admitted on 4/10/2017 and is S/P laparoscopic ventral hernia repair. Hospitalist asked to see the patient for hyperkalemia and elevated creatinine.      S/P laparoscopic ventral hernia repair: Surgery performed on 4/10 by Dr. Kendall. EBL 10 ccs. Pt tolerated well.   -- Defer routine post-operative cares and pain control to primary service   -- Bowel regimen in place while on narcotics;   -- Encourage pulmonary toilet; incentive spirometer at bedside      Hyperkalemia: Potassium on 4/6/17 was 3.3. Unclear if repeat was performed during surgery, but no evidence of repeat potassium in Epic. Potassium replacement protocol had been ordered so patient received 60 mEq.    -- Pt given 7.5 U insulin per protocol with dextrose 50% injection and dextrose 10% infusion; also given sodium bicarb per protocol   -- K+ now 4.4.       EVERETTE:   -- Now resolved with iv fluids. Likely prerenal.   -- PTA lisinopril, HCTZ on hold. May resume when bp more elevated.    -- Avoid nephrotoxic agents      Acute blood loss anemia: Hgb 12.5--8.2 gm/dl. EBL 10 ccs. This is in the setting of surgery as above   -- Recheck as an outpatient.      HTN: Continue PTA Amlodipine.  -- Hold PTA Lisinopril and HCTZ until bp more elevated. Patient has Sphygmomanometer at home and will record bp's. Restart if bp consistently elevated. They will check with pcp.        Anxiety: Continue PTA Effexor     DVT Prophylaxis: Pneumatic Compression Devices and Ambulate every shift. Lovenox d/c 4/11.   Code Status: Full Code     Disposition: Expected discharge today or tomorrow. .    Edgar Everett MD  Pager:  [phone]  Text Page (7 am to 6 pm)      Interval History   No new  complaints.     -Data reviewed today: I reviewed all new labs and imaging results over the last 24 hours. I personally reviewed no images or EKG's today.    Physical Exam   Temp: 98.2  F (36.8  C) Temp src: Oral BP: 132/73 Pulse: 102 Heart Rate: 101 Resp: 16 SpO2: 92 % O2 Device: None (Room air)    Vitals:    04/10/17 0948   Weight: 72.7 kg (160 lb 4.8 oz)     Vital Signs with Ranges  Temp:  [97.9  F (36.6  C)-98.4  F (36.9  C)] 98.2  F (36.8  C)  Pulse:  [102-110] 102  Heart Rate:  [] 101  Resp:  [16-18] 16  BP: ()/(45-87) 132/73  SpO2:  [90 %-98 %] 92 %  I/O last 3 completed shifts:  In: 3210 [P.O.:1350; I.V.:1610; IV Piggyback:250]  Out: 1800 [Urine:1800]    GENERAL:  Pleasant, sitting up in chair. NAD>   HEAD:  NC/AT   EYES: PERRL  NECK:  Supple    CARDIAC: Regular rate and rhythm.  +S1/S2.  No murmurs, rubs, or gallops.  RESPIRATORY: Breathing unlabored.  Clear to ausculation, no wheezes, rhonchi, or rales.  GI:  Soft, appropriately tender. BS+   LYMPHATICS:  No cyanosis or edema.  Distal pulses palpable.  NEURO: CN II-XII intact.     Medications        ranitidine  150 mg Oral BID     bisacodyl  10 mg Rectal Once     amLODIPine  2.5 mg Oral Daily     venlafaxine  75 mg Oral TID w/meals     sodium chloride (PF)  3 mL Intravenous Q8H     senna-docusate  1 tablet Oral BID       Data     Recent Labs  Lab 04/12/17  0922 04/12/17  0720 04/11/17  2115 04/11/17  1600 04/11/17  1145 04/11/17  0855  04/10/17  1500 04/06/17  1531   WBC 6.3  --   --   --   --  10.8  --   --  6.3   HGB 8.2*  --  7.5*  --   --  8.8*  --   --  12.5   MCV 93  --   --   --   --  92  --   --  90     --   --   --   --  290  --  320 374   NA  --  137  --  138  --  135  < >  --  138   POTASSIUM  --  4.4  --  4.4 5.0 6.4*  < >  --  3.3*   CHLORIDE  --  103  --  103  --  103  < >  --  100   CO2  --  28  --  27  --  27  < >  --  31   BUN  --  17  --  27  --  28  < >  --  31*   CR  --  0.72  --  1.09*  --  1.19*  < > 0.83 0.83    ANIONGAP  --  6  --  8  --  5  < >  --  7   GUS  --  8.5  --  8.8  --  8.8  < >  --  9.1   GLC  --  98  --  117*  --  115*  < >  --  85   < > = values in this interval not displayed.    No results found for this or any previous visit (from the past 24 hour(s)).

## 2017-04-12 NOTE — DISCHARGE SUMMARY
[facility] Discharge Summary    Olivia Rodriguez MRN# [id number]   Age: 72 year old YOB: 1945     Date of Admission:  4/10/2017  Date of Discharge:  4/12/2017  Admitting Provider:  Dayanara Kendall MD  Discharge Provider:  Dayanara Kendall MD  Discharging Service: General Surgery     Primary Provider: Ame Villalobos  Primary Care Physician Phone Number: [phone]          Admission Diagnoses:   Principle Diagnosis: ventral hernia  Secondary Diagnoses: hyperkalemia, elevated creatinine, acute blood loss anemia          Discharge Diagnosis:   - s/p laparoscopic ventral hernia repair  - post operative acute blood loss anemia  - hyperkalemia, elevated creatinine - resolved          Procedures:   Procedure(s): Laparoscopic ventral incisional hernia repair with mesh            Discharge Medications:     Current Discharge Medication List      START taking these medications    Details   senna-docusate (SENOKOT-S;PERICOLACE) 8.6-50 MG per tablet Take 1 tablet by mouth 2 times daily  Qty: 30 tablet, Refills: 0    Associated Diagnoses: Incisional hernia, without obstruction or gangrene      HYDROcodone-acetaminophen (NORCO) 5-325 MG per tablet Take 1-2 tablets by mouth every 4 hours as needed for moderate to severe pain  Qty: 30 tablet, Refills: 0    Associated Diagnoses: Incisional hernia, without obstruction or gangrene         CONTINUE these medications which have NOT CHANGED    Details   Lisinopril (PRINIVIL PO) Take 40 mg by mouth At Bedtime      Pravastatin Sodium (PRAVACHOL PO) Take 40 mg by mouth At Bedtime      Sulfamethoxazole-Trimethoprim (BACTRIM DS PO) Take 0.5 tablets by mouth daily as needed (Take 0.5 tablet after intercourse to prevent urinary tract infection.)      Venlafaxine HCl (EFFEXOR XR PO) Take 225 mg by mouth daily (with breakfast) (Patient takes 3 x 75 mg = 225 mg dose)      Aspirin-Acetaminophen-Caffeine (EXCEDRIN PO) Take 1 tablet by mouth every morning (for morning  headache)      Fexofenadine HCl (ALLEGRA PO) Take 1 tablet by mouth daily      NONFORMULARY Apply 1 drop to eye daily as needed (Dry eyes.) (Compounded eye drop.) ([facility])      amLODIPine (NORVASC) 2.5 MG tablet Take 1 tablet (2.5 mg) by mouth daily  Qty: 90 tablet, Refills: 3    Comments: Profile Rx: patient will contact pharmacy when needed  Associated Diagnoses: Essential hypertension      hydrochlorothiazide (HYDRODIURIL) 25 MG tablet Take 1 tablet (25 mg) by mouth daily  Qty: 90 tablet, Refills: 1    Associated Diagnoses: Essential hypertension      polyethylene glycol (MIRALAX) powder Take 17 g by mouth daily as needed  Qty: 510 g, Refills: 1    Associated Diagnoses: Constipation         STOP taking these medications       Acetaminophen (TYLENOL PO) Comments:   Reason for Stopping:                   Allergies:         Allergies   Allergen Reactions     Seafood Anaphylaxis     Femara [Letrozole] Rash     Latex Rash     WITH LATEX BANDAIDS     Tamoxifen Rash             Brief History of Illness:   Olivia Rodriguez presented for an elective lower abdominal incisional ventral hernia repair.      After discussing the risks, benefits, and possible complications, informed consent was obtained and the patient underwent the above procedure.  There were no complications.  Please see the Operative Report for full details.           Hospital Course:   The procedure went well and EBL was minimal. She was admitted postoperatively for pain control. Her labs were check on POD 1 and revealed elevated potassium following replacement for hypokalemia noted by RN. A follow up potassium was higher. She was given shifting medications and Hospitalist service was consulted. Her potassium normalized with treatment. Her creatinine was also elevated and likely due to hypovolemia and medications. She received toradol for pain control pod 1. Her hgb also decreased from pre operatively much more than expected and is likely due  "to lovenox and toradol in the immediate post operative period. These were discontinued immediately with decreased hgb noted. She had mild nausea on POD 1 this was resolving by POD 2. Her hgb stabilized. She was tolerating a regular diet and passing flatus prior to discharge.    On the date of discharge, the patient was discharged to home in stable condition and afebrile.  She verbalized understanding of all discharge instructions and felt comfortable with the discharge plan.  She was asked to call with any further questions or concerns.         Condition on Discharge:      Discharge condition: Stable   Discharge vitals: Blood pressure 137/74, pulse 95, temperature 98.2  F (36.8  C), temperature source Oral, resp. rate 16, height 5' 4\" (1.626 m), weight 160 lb 4.8 oz (72.7 kg), last menstrual period 09/01/2000, SpO2 91 %, not currently breastfeeding.           Discharge Disposition:   Discharged to home          Discharge Instructions and Follow-Up:      Olivia Rodriguez was asked to follow up with surgical team in 1-2 weeks and with her PCP within 1 week for follow up CBC, BMP. If no bowel movement by Friday, April 14th, she should call the clinic as well.       Dayanara Kendall MD  [facility]          "

## 2017-04-13 LAB — INTERPRETATION ECG - MUSE: NORMAL

## 2017-04-18 ENCOUNTER — OFFICE VISIT (OUTPATIENT)
Dept: FAMILY MEDICINE | Facility: CLINIC | Age: 72
End: 2017-04-18
Payer: COMMERCIAL

## 2017-04-18 VITALS
TEMPERATURE: 98.9 F | BODY MASS INDEX: 26.12 KG/M2 | HEIGHT: 64 IN | WEIGHT: 153 LBS | DIASTOLIC BLOOD PRESSURE: 78 MMHG | HEART RATE: 103 BPM | SYSTOLIC BLOOD PRESSURE: 139 MMHG | OXYGEN SATURATION: 97 %

## 2017-04-18 DIAGNOSIS — I10 ESSENTIAL HYPERTENSION: ICD-10-CM

## 2017-04-18 DIAGNOSIS — Z98.890 S/P REPAIR OF VENTRAL HERNIA: Primary | ICD-10-CM

## 2017-04-18 DIAGNOSIS — D62 ANEMIA DUE TO BLOOD LOSS, ACUTE: ICD-10-CM

## 2017-04-18 DIAGNOSIS — E87.5 HYPERKALEMIA: ICD-10-CM

## 2017-04-18 DIAGNOSIS — Z87.19 S/P REPAIR OF VENTRAL HERNIA: Primary | ICD-10-CM

## 2017-04-18 LAB
ANION GAP SERPL CALCULATED.3IONS-SCNC: 7 MMOL/L (ref 3–14)
BUN SERPL-MCNC: 20 MG/DL (ref 7–30)
CALCIUM SERPL-MCNC: 9.4 MG/DL (ref 8.5–10.1)
CHLORIDE SERPL-SCNC: 101 MMOL/L (ref 94–109)
CO2 SERPL-SCNC: 29 MMOL/L (ref 20–32)
CREAT SERPL-MCNC: 0.88 MG/DL (ref 0.52–1.04)
ERYTHROCYTE [DISTWIDTH] IN BLOOD BY AUTOMATED COUNT: 15.5 % (ref 10–15)
FERRITIN SERPL-MCNC: 176 NG/ML (ref 8–252)
GFR SERPL CREATININE-BSD FRML MDRD: 63 ML/MIN/1.7M2
GLUCOSE SERPL-MCNC: 132 MG/DL (ref 70–99)
HCT VFR BLD AUTO: 32.5 % (ref 35–47)
HGB BLD-MCNC: 10.2 G/DL (ref 11.7–15.7)
MCH RBC QN AUTO: 30.1 PG (ref 26.5–33)
MCHC RBC AUTO-ENTMCNC: 31.4 G/DL (ref 31.5–36.5)
MCV RBC AUTO: 96 FL (ref 78–100)
PLATELET # BLD AUTO: 513 10E9/L (ref 150–450)
POTASSIUM SERPL-SCNC: 3.7 MMOL/L (ref 3.4–5.3)
RBC # BLD AUTO: 3.39 10E12/L (ref 3.8–5.2)
SODIUM SERPL-SCNC: 137 MMOL/L (ref 133–144)
WBC # BLD AUTO: 7.3 10E9/L (ref 4–11)

## 2017-04-18 PROCEDURE — 85027 COMPLETE CBC AUTOMATED: CPT | Performed by: INTERNAL MEDICINE

## 2017-04-18 PROCEDURE — 99214 OFFICE O/P EST MOD 30 MIN: CPT | Performed by: INTERNAL MEDICINE

## 2017-04-18 PROCEDURE — 80048 BASIC METABOLIC PNL TOTAL CA: CPT | Performed by: INTERNAL MEDICINE

## 2017-04-18 PROCEDURE — 82728 ASSAY OF FERRITIN: CPT | Performed by: INTERNAL MEDICINE

## 2017-04-18 PROCEDURE — 36415 COLL VENOUS BLD VENIPUNCTURE: CPT | Performed by: INTERNAL MEDICINE

## 2017-04-18 NOTE — PROGRESS NOTES
SUBJECTIVE:                                                    Olivia Rodriguez is a 72 year old female who presents to clinic today for the following health issues:  She presents with her      Hospital Follow-up Visit:    Hospital/Nursing Home/IP Rehab Facility: [facility]  Date of Admission: 04/10/2017  Date of Discharge: 04/12/2017  Reason(s) for Admission:      - s/p laparoscopic ventral hernia repair  - post operative acute blood loss anemia  - hyperkalemia, elevated creatinine -          Problems taking medications regularly:  None       Medication changes since discharge: None       Problems adhering to non-medication therapy:  None    Summary of hospitalization:  [facility] discharge summary reviewed  Diagnostic Tests/Treatments reviewed.  Follow up needed: with surgeon  Other Healthcare Providers Involved in Patient s Care:         None  Update since discharge: improved.     Post Discharge Medication Reconciliation: discharge medications reconciled and changed, per note/orders (see AVS).  Plan of care communicated with patient and      Coding guidelines for this visit:  Type of Medical   Decision Making Face-to-Face Visit       within 7 Days of discharge Face-to-Face Visit        within 14 days of discharge   Moderate Complexity [id number]   High Complexity [id number]        Patient has been feeling fatigued since her surgery and prefers to stay in bed  She stayed home for [facility], as travelling to see family would have been too exhausting  Pain is under control and she has not had to overuse her pain medication  Intake is good, has been eating and drinking plenty, her  confirms this  Stitches are dissolvable so they do not need to make an appointment for removal      Problem list and histories reviewed & adjusted, as indicated.  Additional history: as documented    Patient Active Problem List   Diagnosis     Major depressive disorder, recurrent episode,  moderate (H)     TMJ (temporomandibular joint syndrome)     Hyperlipidemia LDL goal <130     S/P total knee arthroplasty     S/P HEATHER-BSO (total abdominal hysterectomy and bilateral salpingo-oophorectomy)     Sensation of feeling cold     Health Care Home     Anxiety     Invasive ductal carcinoma of breast (H)     Memory difficulty     Controlled substance agreement signed     Essential hypertension     Coronary artery calcification     Hyperlipidemia LDL goal <100     Lung nodule     Malignant neoplasm of upper-outer quadrant of right female breast (H)     Trigeminal neuralgia     Status post total bilateral knee replacement     Incisional hernia     Past Surgical History:   Procedure Laterality Date     BIOPSY  13    right breast biopsy     BIOPSY  13    right axillary lymph node     BREAST SURGERY  13    right breast partial mastectomy/lumpectomy      SECTION       COLONOSCOPY      She had colonoscopy and endoscopy done on November 15, 2012 for workup of iron deficiency anemia and the results were satisfactory     GYN SURGERY           HEAD & NECK SURGERY      surgery for Trigeminal neuralgia       HYSTERECTOMY TOTAL ABDOMINAL, BILATERAL SALPINGO-OOPHORECTOMY, COMBINED       LAPAROSCOPIC HERNIORRHAPHY INCISIONAL N/A 4/10/2017    Procedure: LAPAROSCOPIC HERNIORRHAPHY INCISIONAL;  Surgeon: Dayanara Kendall MD;  Location: [facility]     MASTECTOMY PARTIAL WITH SENTINEL NODE  2013    Procedure: MASTECTOMY PARTIAL WITH SENTINEL NODE;  RIGHT PARTIAL MASTECTOMY WITH RIGHT AXILLARY SENTINEL NODE BIOPSY;  Surgeon: Kae Padilla MD;  Location: [facility]     ORTHOPEDIC SURGERY      Right TKA and Left TKA       Social History   Substance Use Topics     Smoking status: Never Smoker     Smokeless tobacco: Never Used     Alcohol use 0.6 oz/week     1 Standard drinks or equivalent per week      Comment: 2-3 glasses     Family History   Problem Relation Age of Onset     Cardiovascular Father       Alzheimer Disease Mother      Cancer - colorectal No family hx of      Breast Cancer No family hx of          Current Outpatient Prescriptions   Medication Sig Dispense Refill     senna-docusate (SENOKOT-S;PERICOLACE) 8.6-50 MG per tablet Take 1 tablet by mouth 2 times daily 30 tablet 0     HYDROcodone-acetaminophen (NORCO) 5-325 MG per tablet Take 1-2 tablets by mouth every 4 hours as needed for moderate to severe pain 30 tablet 0     Lisinopril (PRINIVIL PO) Take 40 mg by mouth At Bedtime       Pravastatin Sodium (PRAVACHOL PO) Take 40 mg by mouth At Bedtime       Sulfamethoxazole-Trimethoprim (BACTRIM DS PO) Take 0.5 tablets by mouth daily as needed (Take 0.5 tablet after intercourse to prevent urinary tract infection.)       Venlafaxine HCl (EFFEXOR XR PO) Take 225 mg by mouth daily (with breakfast) (Patient takes 3 x 75 mg = 225 mg dose)       Aspirin-Acetaminophen-Caffeine (EXCEDRIN PO) Take 1 tablet by mouth every morning (for morning headache)       Fexofenadine HCl (ALLEGRA PO) Take 1 tablet by mouth daily       NONFORMULARY Apply 1 drop to eye daily as needed (Dry eyes.) (Compounded eye drop.) ([facility])       amLODIPine (NORVASC) 2.5 MG tablet Take 1 tablet (2.5 mg) by mouth daily 90 tablet 3     hydrochlorothiazide (HYDRODIURIL) 25 MG tablet Take 1 tablet (25 mg) by mouth daily 90 tablet 1     polyethylene glycol (MIRALAX) powder Take 17 g by mouth daily as needed 510 g 1     Allergies   Allergen Reactions     Seafood Anaphylaxis     Femara [Letrozole] Rash     Latex Rash     WITH LATEX BANDAIDS     Tamoxifen Rash       Reviewed and updated as needed this visit by clinical staff       Reviewed and updated as needed this visit by Provider         ROS:  Constitutional, HEENT, cardiovascular, pulmonary, gi and gu systems are negative, except as otherwise noted.    POS for scar from ventral hernia repair      This document serves as a record of the services and decisions personally performed and  "made by Ame Villalobos MD. It was created on her behalf by Beni Estevez, a trained medical scribe. The creation of this document is based on the provider's statements to the medical scribe.    Priscilla Estevez 10:02 AM, April 18, 2017    OBJECTIVE:                                                    /78 (BP Location: Left arm, Patient Position: Chair, Cuff Size: Adult Regular)  Pulse 103  Temp 98.9  F (37.2  C) (Oral)  Ht 1.626 m (5' 4\")  Wt 69.4 kg (153 lb)  LMP 09/01/2000  SpO2 97%  BMI 26.26 kg/m2  Body mass index is 26.26 kg/(m^2).  GENERAL APPEARANCE: healthy, alert and no distress  SKIN: Examined post-op scar from ventral hernia repair, healing nicely with no signs of infection  PSYCH: mentation appears normal and affect normal/bright       ASSESSMENT/PLAN:                                                      Olivia was seen today for hospital f/u.    Diagnoses and all orders for this visit:    S/P repair of ventral hernia  Repair went well and patient is recovering well    Anemia due to blood loss, acute  Due to recent surgery.  Will continue to monitor  Will make sure that her hemoglobin is improving since surgery with labs today  -     CBC with platelets  -     Ferritin    Hyperkalemia;  Noted in hospital but improving  Will continue to monitor    Essential hypertension  Will resume her HCZ  Discussed the importance of keeping BP under good control to reduce the risk of heart attack and stroke.   Advised to continue to monitor bp once a week  at home and bring those readings on next visit.  Notify us if bp readings consistently stay greater than 140/90 mmHg  Discussed the importance of physical activity and a healthy diet  -     Basic metabolic panel      Follow up in one month  Patient was advised to seek sooner medical attention if there is any new or worsening symptoms    The information in this document, created by the medical scribe for me, accurately reflects the services I " personally performed and the decisions made by me. I have reviewed and approved this document for accuracy prior to leaving the patient care area.  Ame Villalobos MD  10:03 AM, 04/18/17    Ame Villalobos MD  [facility]

## 2017-04-18 NOTE — NURSING NOTE
"Chief Complaint   Patient presents with     Hospital F/U       Initial /84 (BP Location: Left arm, Cuff Size: Adult Large)  Pulse 103  Temp 98.9  F (37.2  C) (Oral)  Ht 5' 4\" (1.626 m)  Wt 153 lb (69.4 kg)  LMP 09/01/2000  SpO2 97%  BMI 26.26 kg/m2 Estimated body mass index is 26.26 kg/(m^2) as calculated from the following:    Height as of this encounter: 5' 4\" (1.626 m).    Weight as of this encounter: 153 lb (69.4 kg).  Medication Reconciliation: complete.    Evelia Cristina CMA      "

## 2017-04-18 NOTE — PATIENT INSTRUCTIONS
Labs today  Follow up in one month  Resume HCTZ.  Seek sooner medical attention if there is any worsening of symptoms or problems.

## 2017-04-18 NOTE — MR AVS SNAPSHOT
After Visit Summary   4/18/2017    Olivia Rodriguez    MRN: [id number]           Patient Information     Date Of Birth          1945        Visit Information        Provider Department      4/18/2017 9:30 AM Ame Villalobos MD [facility]        Today's Diagnoses     S/P repair of ventral hernia    -  1    Anemia due to blood loss, acute        Hyperkalemia        Essential hypertension          Care Instructions    Labs today  Follow up in one month  Resume HCTZ.  Seek sooner medical attention if there is any worsening of symptoms or problems.          Follow-ups after your visit        Your next 10 appointments already scheduled     Apr 25, 2017 10:00 AM CDT   Post Op with Dayanara Kendall MD   [facility]    [address] [facility]  [facility] [id number] [phone]            Jun 21, 2017  3:45 PM CDT   Office Visit with Hilary Ledezma,    [facility]    [address] [id number] [phone]           Bring a current list of meds and any records pertaining to this visit.  For Physicals, please bring immunization records and any forms needing to be filled out.  Please arrive 10 minutes early to complete paperwork.              Who to contact     If you have questions or need follow up information about today's clinic visit or your schedule please contact [facility] directly at [phone].  Normal or non-critical lab and imaging results will be communicated to you by MyChart, letter or phone within 4 business days after the clinic has received the results. If you do not hear from us within 7 days, please contact the clinic through MyChart or phone. If you have a critical or abnormal lab result, we will notify you by phone as soon as possible.  Submit refill requests through [employer] or call your pharmacy and they will forward the refill request to us. Please  "allow 3 business days for your refill to be completed.          Additional Information About Your Visit        MyChart Information     CardiaLen gives you secure access to your electronic health record. If you see a primary care provider, you can also send messages to your care team and make appointments. If you have questions, please call your primary care clinic.  If you do not have a primary care provider, please call [phone] and they will assist you.        Care EveryWhere ID     This is your Care EveryWhere ID. This could be used by other organizations to access your [facility] medical records  [id number]        Your Vitals Were     Pulse Temperature Height Last Period Pulse Oximetry BMI (Body Mass Index)    103 98.9  F (37.2  C) (Oral) 5' 4\" (1.626 m) 09/01/2000 97% 26.26 kg/m2       Blood Pressure from Last 3 Encounters:   04/18/17 139/78   04/12/17 137/74   04/06/17 138/82    Weight from Last 3 Encounters:   04/18/17 153 lb (69.4 kg)   04/10/17 160 lb 4.8 oz (72.7 kg)   04/06/17 157 lb (71.2 kg)              We Performed the Following     Basic metabolic panel     CBC with platelets     Ferritin        Primary Care Provider Office Phone # Fax #    Ame Villalobos [phone]       [facility]    [address]  [place]                [place] [address]        Thank you!     Thank you for choosing [facility]  for your care. Our goal is always to provide you with excellent care. Hearing back from our patients is one way we can continue to improve our services. Please take a few minutes to complete the written survey that you may receive in the mail after your visit with us. Thank you!             Your Updated Medication List - Protect others around you: Learn how to safely use, store and throw away your medicines at www.disposemymeds.org.          This list is accurate as of: 4/18/17 10:03 AM.  Always use your most recent med list.                   Brand Name " Dispense Instructions for use    ALLEGRA PO      Take 1 tablet by mouth daily       amLODIPine 2.5 MG tablet    NORVASC    90 tablet    Take 1 tablet (2.5 mg) by mouth daily       BACTRIM DS PO      Take 0.5 tablets by mouth daily as needed (Take 0.5 tablet after intercourse to prevent urinary tract infection.)       EFFEXOR XR PO      Take 225 mg by mouth daily (with breakfast) (Patient takes 3 x 75 mg = 225 mg dose)       EXCEDRIN PO      Take 1 tablet by mouth every morning (for morning headache)       hydrochlorothiazide 25 MG tablet    HYDRODIURIL    90 tablet    Take 1 tablet (25 mg) by mouth daily       HYDROcodone-acetaminophen 5-325 MG per tablet    NORCO    30 tablet    Take 1-2 tablets by mouth every 4 hours as needed for moderate to severe pain       NONFORMULARY      Apply 1 drop to eye daily as needed (Dry eyes.) (Compounded eye drop.) ([facility])       polyethylene glycol powder    MIRALAX    510 g    Take 17 g by mouth daily as needed       PRAVACHOL PO      Take 40 mg by mouth At Bedtime       PRINIVIL PO      Take 40 mg by mouth At Bedtime       senna-docusate 8.6-50 MG per tablet    SENOKOT-S;PERICOLACE    30 tablet    Take 1 tablet by mouth 2 times daily

## 2017-04-18 NOTE — LETTER
[facility]  [address] [place] [address] [address] [facility]  [address]  [place]  [address]  Tel: [phone]    April 19, 2017    Olivia CHRIS Michael  [address]   [place] [address]        Dear Ms. Rodriguez,    This is to inform you regarding your test result.    Sodium and potassium level is normal.  Ferritin which is iron stores in the body is normal.  Hemoglobin is low but improving.  platelets are elevated   Will recheck in one month    If you have any further questions or problems, please contact our office.      Sincerely,    Ame Villalobos MD/ANAND          Enclosure: Lab Results  Results for orders placed or performed in visit on 04/18/17   CBC with platelets   Result Value Ref Range    WBC 7.3 4.0 - 11.0 10e9/L    RBC Count 3.39 (L) 3.8 - 5.2 10e12/L    Hemoglobin 10.2 (L) 11.7 - 15.7 g/dL    Hematocrit 32.5 (L) 35.0 - 47.0 %    MCV 96 78 - 100 fl    MCH 30.1 26.5 - 33.0 pg    MCHC 31.4 (L) 31.5 - 36.5 g/dL    RDW 15.5 (H) 10.0 - 15.0 %    Platelet Count 513 (H) 150 - 450 10e9/L   Ferritin   Result Value Ref Range    Ferritin 176 8 - 252 ng/mL   Basic metabolic panel   Result Value Ref Range    Sodium 137 133 - 144 mmol/L    Potassium 3.7 3.4 - 5.3 mmol/L    Chloride 101 94 - 109 mmol/L    Carbon Dioxide 29 20 - 32 mmol/L    Anion Gap 7 3 - 14 mmol/L    Glucose 132 (H) 70 - 99 mg/dL    Urea Nitrogen 20 7 - 30 mg/dL    Creatinine 0.88 0.52 - 1.04 mg/dL    GFR Estimate 63 >60 mL/min/1.7m2    GFR Estimate If Black 76 >60 mL/min/1.7m2    Calcium 9.4 8.5 - 10.1 mg/dL

## 2017-04-19 NOTE — PROGRESS NOTES
This is to inform you regarding your test result.    Sodium and potassium level is normal.  Ferritin which is iron stores in the body is normal.  Hemoglobin is low but improving.  platelets are elevated   Will recheck in one month    Dr.Nasima Wilfredo MD,FACP

## 2017-04-25 ENCOUNTER — OFFICE VISIT (OUTPATIENT)
Dept: SURGERY | Facility: CLINIC | Age: 72
End: 2017-04-25
Payer: COMMERCIAL

## 2017-04-25 DIAGNOSIS — Z09 SURGERY FOLLOW-UP EXAMINATION: Primary | ICD-10-CM

## 2017-04-25 PROCEDURE — 99024 POSTOP FOLLOW-UP VISIT: CPT | Performed by: SURGERY

## 2017-04-25 NOTE — MR AVS SNAPSHOT
After Visit Summary   4/25/2017    Olivia Rodriguez    MRN: [id number]           Patient Information     Date Of Birth          1945        Visit Information        Provider Department      4/25/2017 10:00 AM Dayanara Kendall MD [facility] [place] [facility]      Today's Diagnoses     Surgery follow-up examination    -  1       Follow-ups after your visit        Your next 10 appointments already scheduled     Jun 21, 2017  3:45 PM CDT   Office Visit with Hilary Ledezma DO   [facility]    [address] [facility] [address] [phone]           Bring a current list of meds and any records pertaining to this visit.  For Physicals, please bring immunization records and any forms needing to be filled out.  Please arrive 10 minutes early to complete paperwork.              Who to contact     If you have questions or need follow up information about today's clinic visit or your schedule please contact [facility] [place] directly at [phone].  Normal or non-critical lab and imaging results will be communicated to you by Preedohart, letter or phone within 4 business days after the clinic has received the results. If you do not hear from us within 7 days, please contact the clinic through KIDOZt or phone. If you have a critical or abnormal lab result, we will notify you by phone as soon as possible.  Submit refill requests through [employer] or call your pharmacy and they will forward the refill request to us. Please allow 3 business days for your refill to be completed.          Additional Information About Your Visit        MyChart Information     [employer] gives you secure access to your electronic health record. If you see a primary care provider, you can also send messages to your care team and make appointments. If you have questions, please call your primary care clinic.  If you do not have a primary  care provider, please call [phone] and they will assist you.        Care EveryWhere ID     This is your Care EveryWhere ID. This could be used by other organizations to access your [facility] medical records  [id number]        Your Vitals Were     Last Period                   09/01/2000            Blood Pressure from Last 3 Encounters:   04/18/17 139/78   04/12/17 137/74   04/06/17 138/82    Weight from Last 3 Encounters:   04/18/17 153 lb (69.4 kg)   04/10/17 160 lb 4.8 oz (72.7 kg)   04/06/17 157 lb (71.2 kg)              Today, you had the following     No orders found for display       Primary Care Provider Office Phone # Fax #    Ame Villalobos [phone]       [facility]    [address]  [place]                [place] [address]        Thank you!     Thank you for choosing [facility]  for your care. Our goal is always to provide you with excellent care. Hearing back from our patients is one way we can continue to improve our services. Please take a few minutes to complete the written survey that you may receive in the mail after your visit with us. Thank you!             Your Updated Medication List - Protect others around you: Learn how to safely use, store and throw away your medicines at www.disposemymeds.org.          This list is accurate as of: 4/25/17 10:33 AM.  Always use your most recent med list.                   Brand Name Dispense Instructions for use    ALLEGRA PO      Take 1 tablet by mouth daily       amLODIPine 2.5 MG tablet    NORVASC    90 tablet    Take 1 tablet (2.5 mg) by mouth daily       BACTRIM DS PO      Take 0.5 tablets by mouth daily as needed (Take 0.5 tablet after intercourse to prevent urinary tract infection.)       EFFEXOR XR PO      Take 225 mg by mouth daily (with breakfast) (Patient takes 3 x 75 mg = 225 mg dose)       EXCEDRIN PO      Take 1 tablet by mouth every morning (for morning headache)       hydrochlorothiazide  25 MG tablet    HYDRODIURIL    90 tablet    Take 1 tablet (25 mg) by mouth daily       NONFORMULARY      Apply 1 drop to eye daily as needed (Dry eyes.) (Compounded eye drop.) ([facility])       polyethylene glycol powder    MIRALAX    510 g    Take 17 g by mouth daily as needed       PRAVACHOL PO      Take 40 mg by mouth At Bedtime       PRINIVIL PO      Take 40 mg by mouth At Bedtime       senna-docusate 8.6-50 MG per tablet    SENOKOT-S;PERICOLACE    30 tablet    Take 1 tablet by mouth 2 times daily

## 2017-04-25 NOTE — LETTER
2017    Surgery Postoperative Note    RE:  Olivia Rodriguez-:  41     S: Ms. Rodriguez is doing very well following her laparoscopic ventral hernia repair on 2017. She had it remained admitted for a few days postoperatively due to acute blood loss anemia as well as elevated creatinine. She has seen Dr. Villalobos her recent labs revealed a hemoglobin of 10 and a normal creatinine. She has been feeling well and has not had pain. An abdominal binder for extra support.     Abdomen: incisions healing well no erythema or induration surrounding laparoscopic incisions.,      A/P  Olivia Rodriguez is recovering from a Laparoscopic ventral Hernia Repair. I advised her to slowly return to regular activity. I expect she will make a complete recovery without issues. We reviewed her pathology today as well.      Thank you for the opportunity to help in her care.     Dayanara Kendall  [facility]  [phone]

## 2017-04-25 NOTE — PROGRESS NOTES
Surgery Postoperative Note    S: Ms. Rodriguez is doing very well following her laparoscopic ventral hernia repair on 4/25/2017.  She had it remained admitted for a few days postoperatively due to acute blood loss anemia as well as elevated creatinine.  She has seen Dr. Villalobos her recent labs revealed a hemoglobin of 10 and a normal creatinine.  She has been feeling well and has not had pain.  An abdominal binder for extra support.    Abdomen: incisions healing well no erythema or induration surrounding laparoscopic incisions.,     A/P  Olivia Rodriguez is recovering from a Laparoscopic ventral Hernia Repair. I advised her to slowly return to regular activity. I expect she will make a complete recovery without issues. We reviewed her pathology today as well.     Thank you for the opportunity to help in her care.    Dayanara Kendall  [facility]  [phone]    Please route or send letter to:  Primary Care Provider (PCP) and Referring Provider

## 2017-05-17 DIAGNOSIS — N39.0 RECURRENT UTI: Primary | ICD-10-CM

## 2017-05-17 DIAGNOSIS — N39.0 UTI (URINARY TRACT INFECTION), UNCOMPLICATED: ICD-10-CM

## 2017-05-17 RX ORDER — CIPROFLOXACIN 250 MG/1
TABLET, FILM COATED ORAL
Qty: 14 TABLET | Refills: 0 | OUTPATIENT
Start: 2017-05-17

## 2017-05-17 RX ORDER — SULFAMETHOXAZOLE/TRIMETHOPRIM 800-160 MG
0.5 TABLET ORAL DAILY PRN
Qty: 30 TABLET | Refills: 1 | Status: SHIPPED | OUTPATIENT
Start: 2017-05-17 | End: 2017-08-09

## 2017-05-17 NOTE — TELEPHONE ENCOUNTER
Spoke with patient on the phone  She states that she normally takes antibiotic following intercourse with her  to prevent UTI.  I see that Bactrim is listed as HISTORICAL, but the request coming from the pharmacy is for CIPRO.  Patient was unsure if the antibiotics that she has been using has been CIPRO or Bactrim.   She does say that she takes 1/2 tablet following intercourse to prevent UTI.    PCP: Please advise on which medication would be most appropriate. Whether it is Cipro or Bactrim.   Needs refill sent to pended pharmacy.     Brissa Jaime RN

## 2017-05-17 NOTE — TELEPHONE ENCOUNTER
ciprofloxacin (CIPRO) 250 MG tablet (Discontinued) 14 tablet 0 12/26/2016          Last Written Prescription Date: 12/26/2016  Last Fill Quantity: 14,  # refills: 0   Last Office Visit with [facility], [facility] or [facility] prescribing provider: 04/18/2017                                         Next 5 appointments (look out 90 days)     Jun 21, 2017  3:45 PM CDT   Office Visit with Hilary Ledezma,    [facility]    [address] [facility] [id number] [phone]

## 2017-05-23 ENCOUNTER — TRANSFERRED RECORDS (OUTPATIENT)
Dept: HEALTH INFORMATION MANAGEMENT | Facility: CLINIC | Age: 72
End: 2017-05-23

## 2017-06-21 ENCOUNTER — OFFICE VISIT (OUTPATIENT)
Dept: FAMILY MEDICINE | Facility: CLINIC | Age: 72
End: 2017-06-21
Payer: COMMERCIAL

## 2017-06-21 VITALS
SYSTOLIC BLOOD PRESSURE: 108 MMHG | TEMPERATURE: 98 F | BODY MASS INDEX: 28.15 KG/M2 | HEART RATE: 68 BPM | DIASTOLIC BLOOD PRESSURE: 66 MMHG | HEIGHT: 64 IN | WEIGHT: 164.9 LBS | OXYGEN SATURATION: 97 %

## 2017-06-21 DIAGNOSIS — R41.89 COGNITIVE IMPAIRMENT: Primary | ICD-10-CM

## 2017-06-21 PROCEDURE — 99215 OFFICE O/P EST HI 40 MIN: CPT | Performed by: INTERNAL MEDICINE

## 2017-06-21 NOTE — MR AVS SNAPSHOT
"              After Visit Summary   6/21/2017    Olivia Rodriguez    MRN: [id number]           Patient Information     Date Of Birth          1945        Visit Information        Provider Department      6/21/2017 3:45 PM Hilary Ledezma DO [facility]        Today's Diagnoses     Cognitive impairment    -  1      Care Instructions    Referral for occupational therapy for cognitive assessment to round out this evaluation and we'll go from there          Follow-ups after your visit        Additional Services     OCCUPATIONAL THERAPY REFERRAL       *This therapy referral will be filtered to a centralized scheduling office at [facility] and the patient will receive a call to schedule an appointment at a [facility] location most convenient for them. *     [facility] provides Occupational Therapy evaluation and treatment and many specialty services across the [facility] system.  If requesting a specialty program, please choose from the list below.    If you have not heard from the scheduling office within 2 business days, please call [phone] for all locations, with the exception of [place], please call [phone].     Treatment: Evaluation & Treatment  Special Instructions/Modalities: CPT  Special Programs: Cognition Assessment     Please be aware that coverage of these services is subject to the terms and limitations of your health insurance plan.  Call member services at your health plan with any benefit or coverage questions.      **Note to Provider:  If you are referring outside of [facility] for the therapy appointment, please list the name of the location in the \"special instructions\" above, print the referral and give to the patient to schedule the appointment.                  Who to contact     If you have questions or need follow up information about today's clinic visit or your schedule please contact [facility] directly at " "[phone].  Normal or non-critical lab and imaging results will be communicated to you by MyChart, letter or phone within 4 business days after the clinic has received the results. If you do not hear from us within 7 days, please contact the clinic through [employer] or phone. If you have a critical or abnormal lab result, we will notify you by phone as soon as possible.  Submit refill requests through [employer] or call your pharmacy and they will forward the refill request to us. Please allow 3 business days for your refill to be completed.          Additional Information About Your Visit        [employer] Information     [employer] gives you secure access to your electronic health record. If you see a primary care provider, you can also send messages to your care team and make appointments. If you have questions, please call your primary care clinic.  If you do not have a primary care provider, please call [phone] and they will assist you.        Care EveryWhere ID     This is your Care EveryWhere ID. This could be used by other organizations to access your [facility] medical records  [id number]        Your Vitals Were     Pulse Temperature Height Last Period Pulse Oximetry Breastfeeding?    68 98  F (36.7  C) (Oral) 5' 4\" (1.626 m) 09/01/2000 97% No    BMI (Body Mass Index)                   28.31 kg/m2            Blood Pressure from Last 3 Encounters:   06/21/17 108/66   04/18/17 139/78   04/12/17 137/74    Weight from Last 3 Encounters:   06/21/17 164 lb 14.4 oz (74.8 kg)   04/18/17 153 lb (69.4 kg)   04/10/17 160 lb 4.8 oz (72.7 kg)              We Performed the Following     OCCUPATIONAL THERAPY REFERRAL        Primary Care Provider Office Phone # Fax #    Ame Villalobos [phone]       [facility]    [address]  [facility] [address]        Equal Access to Services     JOCY POSADA AH: Hadii hussein Sykes, waguyda franco, qaybta juventino lopez, " quinton moffett stephen perez'aan ah. So [facility] [phone].    ATENCIÓN: Si habla srinath, tiene a perla disposición servicios gratuitos de asistencia lingüística. Kalyn brown [phone].    We comply with applicable federal civil rights laws and [place] laws. We do not discriminate on the basis of race, color, national origin, age, disability sex, sexual orientation or gender identity.            Thank you!     Thank you for choosing [facility]  for your care. Our goal is always to provide you with excellent care. Hearing back from our patients is one way we can continue to improve our services. Please take a few minutes to complete the written survey that you may receive in the mail after your visit with us. Thank you!             Your Updated Medication List - Protect others around you: Learn how to safely use, store and throw away your medicines at www.disposemymeds.org.          This list is accurate as of: 6/21/17  4:51 PM.  Always use your most recent med list.                   Brand Name Dispense Instructions for use Diagnosis    ALLEGRA PO      Take 1 tablet by mouth daily        amLODIPine 2.5 MG tablet    NORVASC    90 tablet    Take 1 tablet (2.5 mg) by mouth daily    Essential hypertension       EFFEXOR XR PO      Take 225 mg by mouth daily (with breakfast) (Patient takes 3 x 75 mg = 225 mg dose)        EXCEDRIN PO      Take 1 tablet by mouth every morning (for morning headache)        hydrochlorothiazide 25 MG tablet    HYDRODIURIL    90 tablet    Take 1 tablet (25 mg) by mouth daily    Essential hypertension       NONFORMULARY      Apply 1 drop to eye daily as needed (Dry eyes.) (Compounded eye drop.) ([facility])        polyethylene glycol powder    MIRALAX    510 g    Take 17 g by mouth daily as needed    Constipation       PRAVACHOL PO      Take 40 mg by mouth At Bedtime        PREVAGEN 10 MG Caps   Generic drug:  Apoaequorin      Take 1 capsule by mouth daily         PRINIVIL PO      Take 40 mg by mouth At Bedtime        senna-docusate 8.6-50 MG per tablet    SENOKOT-S;PERICOLACE    30 tablet    Take 1 tablet by mouth 2 times daily    Incisional hernia, without obstruction or gangrene       sulfamethoxazole-trimethoprim 800-160 MG per tablet    BACTRIM DS    30 tablet    Take 0.5 tablets by mouth daily as needed (Take 0.5 tablet after intercourse to prevent urinary tract infection.)    Recurrent UTI

## 2017-06-21 NOTE — PROGRESS NOTES
"MEMORY EVALUATION CLINIC - INITIAL EVALUATION    HPI: Olivia is a 72 YoF who presents to the clinic today for memory evaluation. She is accompanied by her  Kevin. Her PCP is Dr. Villalobos. Noted MCI diagnosis about 1.5-2.0 years ago by Dr. Villalobos. She has multiple areas of cognitive concern today as noted below.    She has a hard time remembering \"everything\".  She has trouble looking at the calendar and organizing the details.  She struggles to keep on track but does not miss duties.   Frequently loses her place while reading and this frustrates her.   Hard time with people's names, even people who are close to her, but remembers \"improtant\" names.  She has a hard time with knowing what day of the week it is.    She notes Kevin is \"my brain\" and takes good care of her. Notes as long as she has Alejandro she is fine. Denies feeling frustrated or anxious and again says \"as long as I have Alejandro\". She frequently turns to Alejandro for prompting and asks him many questions.      Impairment has been over the last two years, but notes after leaving the hospital her memory loss has become more severe.   She has had 3 surgeries (ex: hernia, jaw) in the past 6 months and were unsure if this is furthering cognitive impairment.  Recently had rhizotomy for trigeminal neuralgia. When she came home she did not recognize their home and she thought Kevin had moved her before figuring it out.   Children are aware of cognitive changes as well.     I found notes from May 2014 of occupational therapy also discussing cognitive impairment (so that was 3 years ago); symptoms actually seemed prominent at that time. She was referred to occupational therapy really for lymphedema therapy after her breast cancer but cognition was also evaluated too. Had SLUMS though which was ok at 26/30.  Per occupational therapy notes at that time Olivia said, \"My mother had Alzheimer's and hoping it's the medication that is screwing with my brain...after " "each surgery I didn't remember very well...My short-term memory is really bad.\"   Pt appears to rely on her spouse d/t memory deficits.   Pt was very pleasant & cooperative, however also very tangential and appeared to have difficulty with focus on topic of education provided.      Social Hx (Employment/Schooling): She grew up in [place]. Her family is Jehovah's witness and she went to [employer] where she majored in . She only worked in the field for a few years d/t leaving to take care of her family. 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 They lived in [place] for 7 years d/t Kevin's job. They have 5 children and 6 grandchildren together. She reads frequently. She and Kevin have been together for 54 years.     PMH: Reviewed. Most notable for: Depression/anxiety, chronic pain with trigeminal neuralgia, breast cancer, HTN, HLP  Is there a h/o delirium, CVA/TIA, TBI, substances, parkinsonism?: None; denies tremor.   Family Hx: Mother had Alzheimer's. Father had a stoke and accompanied memory concerns.      ADLs: Independent   Driving: No longer drives, she d/c in 2003 when they moved from [place] back to the [place]. Does not plan on driving in the future.   Finances: Kevin generally has always taken care of finances except when they lived in [place].   Shopping/housekeeping/meal prep: Independent, but has a hard time finding recipes. Denies leaving a burner on, or missing ingredients and Kevin agrees with her.   Medications: Partnered together for their morning meds, and may forget evening meds. Kevin has to remind her what day it is. Taking Prevagen for past few months, but hasn't noticed any change.    Home situation: Independent living with spouse. They had questions about moving to long term care and memory care in the future.    Support: [place].   Behaviors/Hallucinations/Delusions: Not discussed today.     REVIEW OF SYSTEMS: Detailed as above     OBJECTIVE: /66 (BP Location: Right " "arm, Patient Position: Chair, Cuff Size: Adult Regular)  Pulse 68  Temp 98  F (36.7  C) (Oral)  Ht 5' 4\" (1.626 m)  Wt 164 lb 14.4 oz (74.8 kg)  LMP 09/01/2000  SpO2 97%  Breastfeeding? No  BMI 28.31 kg/m2   EXAM:  Alert, pleasant, NAD, casually dressed without odor  Questionable left hand resting tremor   Looked to  frequently to answer questions  States she is happy and she does look cheerful during much of the visit though is anxious for the clinic visit to be completed  MoCA: 11/30 (2, 2, 2, 1, 0, 1, 0, 1, 0, 2). Struggled with most sections of the testing. Attention was reasonable with numbers forward/back and \"A\"s but couldn't complete serial 7s (but attention was preserved for spelling \"world\" backward).      INVESTIGATIONS: Thyroid and B12 normal per last labs on 03/03/17.   MRI brain Feb 2017 in investigation of her trigeminal neuralgia:  IMPRESSION:   1. No evidence for abnormality of any portion of the 5th cranial nerve on either side.  2. Diffuse cerebral volume loss and cerebral white matter changes consistent with sequela of chronic small vessel ischemic disease.      ASSESSMENT/PLAN: Olivia Rodriguez is a pleasant 72yoF here today for initial consultation re: progressive cognitive impairment. MoCA was quite impaired but seemed to pay reasonable attention so likely it was accurate and less likely delirium. She does have comorbidities including h/o depression/anxiety, MCI and trigeminal neuralgia with recent surgeries, as well as family h/o Alzheimer's disease in her mother also. Though she and Kevin think cognitive impairment has been most pronounced for the past 2 years, notes 3 years ago also comment on changes. Will refer to additional occupational therapy CPT functional assessment. Currently seems to have good support from her . May discuss medications, etc, at next office visit.    Patient Instructions   Referral for occupational therapy for cognitive assessment to round out this " evaluation and we'll go from there      Thank you for this consultation!    MDM: 60 minutes spent with patient/, over 50% time counseling, coordinating care and explaining about nature of the patient's conditions and performing MoCA evaluation.    Hilary Ledezma, DO  Internal Medicine and Geriatrics   [facility]

## 2017-06-21 NOTE — NURSING NOTE
"Chief Complaint   Patient presents with     memory care       Initial /66 (BP Location: Right arm, Patient Position: Chair, Cuff Size: Adult Regular)  Pulse 68  Temp 98  F (36.7  C) (Oral)  Ht 5' 4\" (1.626 m)  Wt 164 lb 14.4 oz (74.8 kg)  LMP 09/01/2000  SpO2 97%  Breastfeeding? No  BMI 28.31 kg/m2 Estimated body mass index is 28.31 kg/(m^2) as calculated from the following:    Height as of this encounter: 5' 4\" (1.626 m).    Weight as of this encounter: 164 lb 14.4 oz (74.8 kg).  Medication Reconciliation: complete     Matthew Kenney CMA     "

## 2017-06-21 NOTE — PATIENT INSTRUCTIONS
Referral for occupational therapy for cognitive assessment to round out this evaluation and we'll go from there

## 2017-06-26 ENCOUNTER — HOSPITAL ENCOUNTER (OUTPATIENT)
Dept: OCCUPATIONAL THERAPY | Facility: CLINIC | Age: 72
Setting detail: THERAPIES SERIES
End: 2017-06-26
Attending: INTERNAL MEDICINE
Payer: COMMERCIAL

## 2017-06-26 DIAGNOSIS — I10 ESSENTIAL HYPERTENSION: ICD-10-CM

## 2017-06-26 PROCEDURE — G9169 MEMORY GOAL STATUS: HCPCS | Mod: GO,CJ

## 2017-06-26 PROCEDURE — 96125 COGNITIVE TEST BY HC PRO: CPT | Mod: GO

## 2017-06-26 PROCEDURE — G9168 MEMORY CURRENT STATUS: HCPCS | Mod: GO,CK

## 2017-06-26 PROCEDURE — 40000125 ZZHC STATISTIC OT OUTPT VISIT

## 2017-06-26 PROCEDURE — 97166 OT EVAL MOD COMPLEX 45 MIN: CPT | Mod: GO

## 2017-06-26 NOTE — TELEPHONE ENCOUNTER
hydrochlorothiazide (HYDRODIURIL) 25 MG tablet        Last Written Prescription Date: 12/21/2016  Last Fill Quantity: 90, # refills: 1  Last Office Visit with [facility], [facility] or [facility] prescribing provider: 4/18/2017       Potassium   Date Value Ref Range Status   04/18/2017 3.7 3.4 - 5.3 mmol/L Final     Creatinine   Date Value Ref Range Status   04/18/2017 0.88 0.52 - 1.04 mg/dL Final     BP Readings from Last 3 Encounters:   06/21/17 108/66   04/18/17 139/78   04/12/17 137/74

## 2017-06-26 NOTE — PROGRESS NOTES
"Cognitive Performance Test    SUMMARY OF TEST:    The Cognitive Performance Test (CPT) is a standardized performance-based assessment to measure working memory/executive function processing capacities that underlie functional performance. Subtasks include common basic and instrumental activities of daily living (ADL/IADL) which are rated based on the manner in which patients respond to task demands of varying complexity. The total CPT score describes a level of functioning that indicates how information is processed, implications for functional activities, potential safety risks and a recommended level of supervision or assist based on cognitive function. The highest total score on this test is in the range of 5.6 to 5.8.    DATE OF TESTIN17     RESULTS OF TESTING:                                                                                         CPT Subtest Results    MEDBOX: 4.5/6 SHOP/GLOVES: 4.0/6 PHONE: 3.5/6   WASH:  4.5/5 TRAVEL: 3.0/6 TOAST: 4.0/5   DRESS: 5.0/5   TOTAL CPT SCORE:  28.5/39     Average CPT Score  4.0/5.6    INTERPRETATION OF TEST RESULTS:    Based on the Cognitive Performance Test, this patient scored at CPT Level 4.0.  See CPT Levels reference below.    Summary of functional cognitive status:   Needed repeated instructions and specific verbal cueing for all subtests. Pt required multiple specific cues for proper med management/direction following task, cues for money management (counting money, paying with correct amount) with shopping task. Pt unable to follow map and/or written instructions for map/navigation task - required max verbal cueing for finding destination, moves quickly, unaware of surroundings in environment for clues. Pt with noted less difficulty with ADL based tasks of dressing and washing - did initially select aftershave for soap, but was able to self correct. Pt easily flustered with tasks, tending to rush through. Repeatedly stating \"I'm nervous.\" "     Factors affecting performance:  No additional problems noted    Recommendations:    Assist for ADL/IADL:  Medication management and all IADL tasks   Supervision for ADL/IADL:  ADL  Supervision in living settin hour                                                       TIME ADMINISTERING TEST: 72    TIME FOR INTERPRETATION AND PREPARATION OF REPORT: 30    TOTAL TIME: 102 minutes      CPT Levels Reference:    Patient's Average CPT Score:  4.0                                                                                                                                                  Individual scores range along a continuum as outlined below.  In addition to cognitive status, other factors may affect safety in a home environment.  Please refer to specific recommendations for this patient.    ___5.6-5.8  Normal functioning (absence of cognitive-functional disability).  Independent in managing personal affairs, monitors and directs own behavior.  Uses complex information to carry out daily activities with safety and accuracy.    Proficient with instrumental activities of daily living (IADL) and learning new activity.  Problems are anticipated, errors are avoided, and consequences of actions are considered.      ___5.0   Mild cognitive-functional disability; deficits in working memory and executive thought processes. Difficulty using complex information. Problems may be observed with recent memory, judgment, reasoning and planning ahead. May be impulsive or have difficulty anticipating consequences.  Safety:  May require assistance to plan ahead; or to manage complex medication schedules, appointments or finances.  Hazardous activities may need to be monitored or limited.  ADL:  Mild functional decline.  Able to complete basic self-care and routine household tasks.  May have difficulty with complex daily tasks such as reading, writing, meal preparation, shopping or driving.   Learns through hands on teaching.  "Self-centered behavior or difficulty considering the needs of others may be seen related to trouble seeing the  whole picture\". Can appear disorganized or uninhibited.    ___4.5  Mild to moderate cognitive-functional disability. Significant deficits in working memory and executive thought processes. Judgment, reasoning and planning show obvious impairment.  Distractible with inability to shift attention/actions given competing stimuli.  Difficulty with problem solving and managing details. Complex daily tasks performed with inconsistency, difficulty, or error.     Safety:  Medications should be monitored, stove use may require supervision, and driving ability may be affected.  Impaired safety awareness with inability to anticipate potential problems.  May not recognize or respond to emergent situations. Requires frequent check-in support.   ADL:  Mild difficulty with simple everyday self-care tasks. Benefits from structured, routine activity.  Will likely need reminders to complete tasks outside of the routine. Requires assistance with planning and IADL tasks like shopping and finances. Learns concrete tasks through repetition, but performance may not generalize. Tends to be impulsive with poor insight. Self centered behavior or inability to consider the needs of others is common.    _x__4.0  Moderate cognitive-functional disability; abstract to concrete thought processes. Working memory and executive function impairments are obvious. Difficulty with planning and problem solving.  Behavior is goal-directed, but unable to follow multi-step directions, is easily distracted, and may not recognize mistakes.  Inability to anticipate hazards or understand precautions.  Safety:  Recommend 24-hour supervision for safety. Supervision needed for medication management and for hazardous activities. May not be able to follow a restricted diet. Can get lost in unfamiliar surroundings. Generally, persons functioning at level 4 " should not be driving.   ADL:  Some decline in quality or frequency of ADL.  [place] enhanced by use of a routine, simple concrete directions, and caregiver set-up of needed items. Complex tasks such as money or home management typically requires assistance.  Relies heavily on vision to guide behavior; will ignore objects/hazards not in plain sight and can be distracted by irrelevant objects. Often has poor insight.  Able to carry out social conversation and may verbally  cover  for deficits leading caregivers to believe they are capable of functioning independently.       ___3.5  Moderate cognitive-functional disability; increased cues needed for task completion. Aware of concrete task steps but needs prompting or cues to initiate and complete simple tasks. Attention span is limited, simple directions may need to be repeated, and re-focus to a topic or task may be required.  Safety:  24-hour supervision required for safety and for assistance with daily tasks. Assistance required with medications, and access to medication should be limited. Meals, nutrition and dietary restrictions need to be monitored.  All hazardous activities should be restricted or supervised. Should not drive. Prone to wandering and can become lost.  ADL:  Moderate functional decline. Familiar tasks usually requires set-up of supplies and directions to complete steps. May need objects handed to them for task initiation. Function best with a set schedule in familiar surroundings with familiar people. All complex tasks must be done by others. Vocabulary is diminished and speech often unfocused.

## 2017-06-26 NOTE — PROGRESS NOTES
" 06/26/17 1110   Quick Adds   Type of Visit Initial Outpatient Occupational Therapy Evaluation   General Information   Start Of Care Date 06/26/17   Referring Physician Hilary Ledezma, DO   Orders Evaluate and treat as indicated   Other Orders (CPT)   Orders Date 06/21/17   Medical Diagnosis Cognitive impairment R41.89   Onset of Illness/Injury or Date of Surgery 04/10/17   Additional Occupational Profile Info/Pertinent History of Current Problem Per chart: Olivia is a 72 YoF who presents to the clinic today for memory evaluation. She is accompanied by her  Kevin. Her PCP is Dr. Villalobos. Noted [facility] 1.5 years ago. She has a hard time remembering \"everything\". She has trouble looking at the calendar and organizing the details. She notes Kevin is \"her brain\" and takes good care of her. She struggles to keep on track but does not miss duties. Frequently loses her place while reading and this frustrates her. Impairment has been over the last two years, but notes after leaving the hospital her memory loss has become more severe.    Role/Living Environment   Current Community Support Family/friend caregiver   Patient role/Employment history Retired   Community/Avocational Activities Pt enjoys reading.   Current Living Environment Apartment/condo   Number of Stairs to Enter Home 0   Primary Bathroom Location/Comments main   Primary Bathroom Set Up/Equipment Tub/Shower combo;Tub grab bar   Prior Level - Transfers Independent   Prior Level - Ambulation Independent   Prior Level - ADLS Independent   Prior Responsibilities - IADL Meal Preparation;Housekeeping;Laundry;Shopping   Prior Level Comments Pt receives assist from spouse for many IADL tasks   Role/Living Environment Comments Kevin assists with medication management and completes money management.    Patient/family Goals Statement Improve memory   Pain   Patient currently in pain No   Fall Risk Screen   Fall screen completed by OT   Have you fallen 2 or more times " "in the last year? No   Have you fallen and had an injury in the past year? No   Cognitive Status Examination   Orientation Person;Place   Level of Consciousness Alert;Confused   Follows Commands and Answers Questions 75% of the time   Personal Safety and Judgment Impaired   Memory Impaired   Attention Reports problems attending;Selective attention impaired, difficulty ignoring irrelevant stimuli;Sustained attention impaired   Cognitive Comment CPT completed per MD order - See CPT results for details   Visual Perception   Visual Perception Wears glasses   Sensation   Sensation Comments Pt denies numbness/tingling in BUEs   Range of Motion (ROM)   ROM Comments WFL   Strength   Strength Comments WFL   Hand Strength   Hand Dominance Right   Hand Strength Comments WFL   Coordination   Upper Extremity Coordination No deficits were identified   Functional Mobility   Ambulation Independent   Transfer Skills   Transfer Comments Independent   Transfer Skill   Level of Tama: Transfers independent   Upper Body Dressing   Level of Tama: Dress Upper Body independent   Lower Body Dressing   Level of Tama: Dress Lower Body independent   Toileting   Level of Tama: Toilet independent   Grooming   Level of Tama: Grooming independent   Activity Tolerance   Activity Tolerance Pt reports she \"fatigues easily\" with tasks   Planned Therapy Interventions   Planned Therapy Interventions ADL training;Cognitive skills;Self care/Home management   Adult OT Eval Goals   OT Eval Goals (Adult) 1;2;3;4   OT Goal 1   Goal Identifier Memory compensation strategies   Goal Description Patient will verbalize understanding of memory compensation strategies and activities to increase cognitive function through brain exercises for improved memory and cognitive skills for increased ADL/IADL independence.   Target Date 08/18/17   OT Goal 2   Goal Identifier Time management strategies   Goal Description Patient to " utilize memory tools (planner, calendar, etc.) effectively and independently for increased ability to manage her time, appointments, daily schedule, etc.    Target Date 08/18/17   OT Goal 3   Goal Identifier CPT   Goal Description Patient and family to verbalize understanding of Cognitive Performance Test results and identify 3 strategies to increase patient's safety and independence in the home setting.   Target Date 08/18/17   OT Goal 4   Goal Identifier EC/WS   Goal Description Patient to verbalize 3 strategies for energy conservation/work simplification and fatigue management for improved independence with ADL/IADLs at home and in the community, and increase FACIT - Fatigue score by 4 points for increased activity level.   Target Date 08/18/17   Clinical Impression   Criteria for Skilled Therapeutic Interventions Met Yes, treatment indicated   OT Diagnosis Impaired ADLs, IADLs   Influenced by the following impairments Impaired safety, cognition   Assessment of Occupational Performance 3-5 Performance Deficits   Identified Performance Deficits Homemaking, self cares, IADLs   Clinical Decision Making (Complexity) Moderate complexity   Rehab Potential good, to achieve stated therapy goals   Therapy Frequency 2x/wk   Predicted Duration of Therapy Intervention (days/wks) 8 wks   Risks and Benefits of Treatment have been explained. Yes   Patient, Family & other staff in agreement with plan of care Yes   Education Assessment   Barriers To Learning Cognitive   Preferred Learning Style Reading;Demonstration   Total Evaluation Time   Total Evaluation Time 72       Pt was seen for evaluation only and failed to schedule further OT appointments following CPT.  Please re-order OT if pt able to participate.

## 2017-06-27 RX ORDER — HYDROCHLOROTHIAZIDE 25 MG/1
TABLET ORAL
Qty: 90 TABLET | Refills: 3 | Status: SHIPPED | OUTPATIENT
Start: 2017-06-27 | End: 2018-09-28

## 2017-07-09 PROBLEM — R41.89 COGNITIVE IMPAIRMENT: Status: ACTIVE | Noted: 2017-07-09

## 2017-09-21 ENCOUNTER — ALLIED HEALTH/NURSE VISIT (OUTPATIENT)
Dept: NURSING | Facility: CLINIC | Age: 72
End: 2017-09-21
Payer: COMMERCIAL

## 2017-09-21 DIAGNOSIS — Z23 NEED FOR PROPHYLACTIC VACCINATION AND INOCULATION AGAINST INFLUENZA: Primary | ICD-10-CM

## 2017-09-21 PROCEDURE — 99207 ZZC NO CHARGE NURSE ONLY: CPT

## 2017-09-21 PROCEDURE — G0008 ADMIN INFLUENZA VIRUS VAC: HCPCS

## 2017-09-21 PROCEDURE — 90662 IIV NO PRSV INCREASED AG IM: CPT

## 2017-09-21 NOTE — PROGRESS NOTES
Injectable Influenza Immunization Documentation    1.  Is the person to be vaccinated sick today?   No    2. Does the person to be vaccinated have an allergy to a component   of the vaccine?   No    3. Has the person to be vaccinated ever had a serious reaction   to influenza vaccine in the past?   No    4. Has the person to be vaccinated ever had Guillain-Barré syndrome?   No    Form completed by rayo hernandez

## 2017-09-21 NOTE — MR AVS SNAPSHOT
After Visit Summary   9/21/2017    Olivia Rodriguez    MRN: [id number]           Patient Information     Date Of Birth          1945        Visit Information        Provider Department      9/21/2017 11:00 AM [place] NURSE [facility] [place]        Today's Diagnoses     Need for prophylactic vaccination and inoculation against influenza    -  1       Follow-ups after your visit        Who to contact     If you have questions or need follow up information about today's clinic visit or your schedule please contact [facility] [place] directly at [phone].  Normal or non-critical lab and imaging results will be communicated to you by [employer], letter or phone within 4 business days after the clinic has received the results. If you do not hear from us within 7 days, please contact the clinic through [employer] or phone. If you have a critical or abnormal lab result, we will notify you by phone as soon as possible.  Submit refill requests through [employer] or call your pharmacy and they will forward the refill request to us. Please allow 3 business days for your refill to be completed.          Additional Information About Your Visit        MyChart Information     [employer] gives you secure access to your electronic health record. If you see a primary care provider, you can also send messages to your care team and make appointments. If you have questions, please call your primary care clinic.  If you do not have a primary care provider, please call [phone] and they will assist you.        Care EveryWhere ID     This is your Care EveryWhere ID. This could be used by other organizations to access your [place] medical records  [id number]        Your Vitals Were     Last Period                   09/01/2000            Blood Pressure from Last 3 Encounters:   06/21/17 108/66   04/18/17 139/78   04/12/17 137/74    Weight from Last 3 Encounters:   06/21/17 164 lb 14.4 oz (74.8 kg)   04/18/17 153 lb  (69.4 kg)   04/10/17 160 lb 4.8 oz (72.7 kg)              We Performed the Following     ADMIN INFLUENZA (For MEDICARE Patients ONLY) []     FLU VACCINE, INCREASED ANTIGEN, PRESV FREE, AGE 65+ [08552]        Primary Care Provider Office Phone # Fax #    Ame MONDRAGON MD Wilfredo [phone] [address]  [facility] [address]        Equal Access to Services     [facility] : Hadii aad ku hadasho Sodominguezali, waaxda luqadaha, qaybta kaalmada adeegyada, waxay idiin hayaan adeeg kharash la'aan . So [facility] [phone].    ATENCIÓN: Si habla español, tiene a perla disposición servicios gratuitos de asistencia lingüística. [facility] [phone].    We comply with applicable federal civil rights laws and [place] laws. We do not discriminate on the basis of race, color, national origin, age, disability sex, sexual orientation or gender identity.            Thank you!     Thank you for choosing [facility]  for your care. Our goal is always to provide you with excellent care. Hearing back from our patients is one way we can continue to improve our services. Please take a few minutes to complete the written survey that you may receive in the mail after your visit with us. Thank you!             Your Updated Medication List - Protect others around you: Learn how to safely use, store and throw away your medicines at www.disposemymeds.org.          This list is accurate as of: 9/21/17 11:58 AM.  Always use your most recent med list.                   Brand Name Dispense Instructions for use Diagnosis    ALLEGRA PO      Take 1 tablet by mouth daily        amLODIPine 2.5 MG tablet    NORVASC    90 tablet    Take 1 tablet (2.5 mg) by mouth daily    Essential hypertension       EFFEXOR XR PO      Take 225 mg by mouth daily (with breakfast) (Patient takes 3 x 75 mg = 225 mg dose)        EXCEDRIN PO      Take 1 tablet by mouth every morning (for morning headache)         hydrochlorothiazide 25 MG tablet    HYDRODIURIL    90 tablet    TAKE 1 TABLET DAILY    Essential hypertension       NONFORMULARY      Apply 1 drop to eye daily as needed (Dry eyes.) (Compounded eye drop.) ([facility])        polyethylene glycol powder    MIRALAX    510 g    Take 17 g by mouth daily as needed    Constipation       PRAVACHOL PO      Take 40 mg by mouth At Bedtime        PREVAGEN 10 MG Caps   Generic drug:  Apoaequorin      Take 1 capsule by mouth daily        PRINIVIL PO      Take 40 mg by mouth At Bedtime        senna-docusate 8.6-50 MG per tablet    SENOKOT-S;PERICOLACE    30 tablet    Take 1 tablet by mouth 2 times daily    Incisional hernia, without obstruction or gangrene       sulfamethoxazole-trimethoprim 800-160 MG per tablet    BACTRIM DS/SEPTRA DS    30 tablet    TAKE HALF TABLET BY MOUTH DAILY AS NEEDED AFTER INTERCOURSE TO PREVENT UTI    Recurrent UTI

## 2017-10-24 ENCOUNTER — TELEPHONE (OUTPATIENT)
Dept: FAMILY MEDICINE | Facility: CLINIC | Age: 72
End: 2017-10-24

## 2017-10-24 ENCOUNTER — OFFICE VISIT (OUTPATIENT)
Dept: FAMILY MEDICINE | Facility: CLINIC | Age: 72
End: 2017-10-24
Payer: COMMERCIAL

## 2017-10-24 VITALS
HEIGHT: 64 IN | WEIGHT: 176 LBS | SYSTOLIC BLOOD PRESSURE: 136 MMHG | BODY MASS INDEX: 30.05 KG/M2 | OXYGEN SATURATION: 96 % | TEMPERATURE: 99.2 F | DIASTOLIC BLOOD PRESSURE: 80 MMHG | HEART RATE: 106 BPM

## 2017-10-24 DIAGNOSIS — R41.89 COGNITIVE IMPAIRMENT: ICD-10-CM

## 2017-10-24 DIAGNOSIS — R30.0 DYSURIA: Primary | ICD-10-CM

## 2017-10-24 DIAGNOSIS — F33.1 MAJOR DEPRESSIVE DISORDER, RECURRENT EPISODE, MODERATE (H): ICD-10-CM

## 2017-10-24 DIAGNOSIS — Z13.1 SCREENING FOR DIABETES MELLITUS: ICD-10-CM

## 2017-10-24 DIAGNOSIS — R41.89 COGNITIVE IMPAIRMENT: Primary | ICD-10-CM

## 2017-10-24 DIAGNOSIS — R10.84 ABDOMINAL PAIN, GENERALIZED: ICD-10-CM

## 2017-10-24 LAB
ALBUMIN UR-MCNC: NEGATIVE MG/DL
APPEARANCE UR: CLEAR
BASOPHILS # BLD AUTO: 0 10E9/L (ref 0–0.2)
BASOPHILS NFR BLD AUTO: 0.5 %
BILIRUB UR QL STRIP: NEGATIVE
COLOR UR AUTO: YELLOW
DIFFERENTIAL METHOD BLD: NORMAL
EOSINOPHIL # BLD AUTO: 0.1 10E9/L (ref 0–0.7)
EOSINOPHIL NFR BLD AUTO: 1.2 %
ERYTHROCYTE [DISTWIDTH] IN BLOOD BY AUTOMATED COUNT: 13.6 % (ref 10–15)
GLUCOSE UR STRIP-MCNC: NEGATIVE MG/DL
HBA1C MFR BLD: 5.3 % (ref 4.3–6)
HCT VFR BLD AUTO: 40 % (ref 35–47)
HGB BLD-MCNC: 13.3 G/DL (ref 11.7–15.7)
HGB UR QL STRIP: NEGATIVE
KETONES UR STRIP-MCNC: ABNORMAL MG/DL
LEUKOCYTE ESTERASE UR QL STRIP: ABNORMAL
LYMPHOCYTES # BLD AUTO: 1.6 10E9/L (ref 0.8–5.3)
LYMPHOCYTES NFR BLD AUTO: 24.2 %
MCH RBC QN AUTO: 31.4 PG (ref 26.5–33)
MCHC RBC AUTO-ENTMCNC: 33.3 G/DL (ref 31.5–36.5)
MCV RBC AUTO: 94 FL (ref 78–100)
MONOCYTES # BLD AUTO: 0.6 10E9/L (ref 0–1.3)
MONOCYTES NFR BLD AUTO: 9.6 %
NEUTROPHILS # BLD AUTO: 4.2 10E9/L (ref 1.6–8.3)
NEUTROPHILS NFR BLD AUTO: 64.5 %
NITRATE UR QL: NEGATIVE
NON-SQ EPI CELLS #/AREA URNS LPF: ABNORMAL /LPF
PH UR STRIP: 6.5 PH (ref 5–7)
PLATELET # BLD AUTO: 367 10E9/L (ref 150–450)
RBC # BLD AUTO: 4.24 10E12/L (ref 3.8–5.2)
RBC #/AREA URNS AUTO: ABNORMAL /HPF
SOURCE: ABNORMAL
SP GR UR STRIP: 1.01 (ref 1–1.03)
UROBILINOGEN UR STRIP-ACNC: 0.2 EU/DL (ref 0.2–1)
WBC # BLD AUTO: 6.5 10E9/L (ref 4–11)
WBC #/AREA URNS AUTO: ABNORMAL /HPF

## 2017-10-24 PROCEDURE — 99214 OFFICE O/P EST MOD 30 MIN: CPT | Performed by: INTERNAL MEDICINE

## 2017-10-24 PROCEDURE — 80053 COMPREHEN METABOLIC PANEL: CPT | Performed by: INTERNAL MEDICINE

## 2017-10-24 PROCEDURE — 85025 COMPLETE CBC W/AUTO DIFF WBC: CPT | Performed by: INTERNAL MEDICINE

## 2017-10-24 PROCEDURE — 83036 HEMOGLOBIN GLYCOSYLATED A1C: CPT | Performed by: INTERNAL MEDICINE

## 2017-10-24 PROCEDURE — 81001 URINALYSIS AUTO W/SCOPE: CPT | Performed by: INTERNAL MEDICINE

## 2017-10-24 PROCEDURE — 36415 COLL VENOUS BLD VENIPUNCTURE: CPT | Performed by: INTERNAL MEDICINE

## 2017-10-24 RX ORDER — CIPROFLOXACIN 250 MG/1
250 TABLET, FILM COATED ORAL 2 TIMES DAILY
Qty: 6 TABLET | Refills: 0 | Status: SHIPPED | OUTPATIENT
Start: 2017-10-24 | End: 2018-01-04

## 2017-10-24 ASSESSMENT — PATIENT HEALTH QUESTIONNAIRE - PHQ9: SUM OF ALL RESPONSES TO PHQ QUESTIONS 1-9: 0

## 2017-10-24 NOTE — NURSING NOTE
"Chief Complaint   Patient presents with     UTI     Abdominal Pain       Initial /80 (BP Location: Right arm, Cuff Size: Adult Large)  Pulse 106  Temp 99.2  F (37.3  C) (Oral)  Ht 5' 4\" (1.626 m)  Wt 176 lb (79.8 kg)  LMP 09/01/2000  SpO2 96%  Breastfeeding? No  BMI 30.21 kg/m2 Estimated body mass index is 30.21 kg/(m^2) as calculated from the following:    Height as of this encounter: 5' 4\" (1.626 m).    Weight as of this encounter: 176 lb (79.8 kg).  Medication Reconciliation: complete.  ELODIA Zabala      "

## 2017-10-24 NOTE — TELEPHONE ENCOUNTER
sofia Allen, reporting Dr. Forrester (psychiatrist) ph# [phone], fax# [phone]  Pt can be reached [phone]

## 2017-10-24 NOTE — MR AVS SNAPSHOT
After Visit Summary   10/24/2017    Olivia Rodriguez    MRN: [id number]           Patient Information     Date Of Birth          1945        Visit Information        Provider Department      10/24/2017 2:00 PM Ame Villalobos MD [facility]        Today's Diagnoses     Dysuria    -  1    Abdominal pain, generalized        Cognitive impairment        Screening for diabetes mellitus          Care Instructions    Hold bactrim while on cipro for 3 days.  Take miralax for constipation.  If symptoms does not improve then let me know and we can order CT scan  Labs today  Seek sooner medical attention if there is any worsening of symptoms or problems.            Follow-ups after your visit        Who to contact     If you have questions or need follow up information about today's clinic visit or your schedule please contact [facility] directly at [phone].  Normal or non-critical lab and imaging results will be communicated to you by [employer], letter or phone within 4 business days after the clinic has received the results. If you do not hear from us within 7 days, please contact the clinic through [employer] or phone. If you have a critical or abnormal lab result, we will notify you by phone as soon as possible.  Submit refill requests through [employer] or call your pharmacy and they will forward the refill request to us. Please allow 3 business days for your refill to be completed.          Additional Information About Your Visit        MyChart Information     [employer] gives you secure access to your electronic health record. If you see a primary care provider, you can also send messages to your care team and make appointments. If you have questions, please call your primary care clinic.  If you do not have a primary care provider, please call [phone] and they will assist you.        Care EveryWhere ID     This is your Care EveryWhere ID. This could be used by other  "organizations to access your [place] medical records  [id number]        Your Vitals Were     Pulse Temperature Height Last Period Pulse Oximetry Breastfeeding?    106 99.2  F (37.3  C) (Oral) 5' 4\" (1.626 m) 09/01/2000 96% No    BMI (Body Mass Index)                   30.21 kg/m2            Blood Pressure from Last 3 Encounters:   10/24/17 136/80   06/21/17 108/66   04/18/17 139/78    Weight from Last 3 Encounters:   10/24/17 176 lb (79.8 kg)   06/21/17 164 lb 14.4 oz (74.8 kg)   04/18/17 153 lb (69.4 kg)              We Performed the Following     CBC with platelets and differential     Comprehensive metabolic panel     Hemoglobin A1c     PAF COMPLETED     UA with Microscopic reflex to Culture          Today's Medication Changes          These changes are accurate as of: 10/24/17  2:46 PM.  If you have any questions, ask your nurse or doctor.               Start taking these medicines.        Dose/Directions    ciprofloxacin 250 MG tablet   Commonly known as:  CIPRO   Used for:  Dysuria   Started by:  Ame Villalobos MD        Dose:  250 mg   Take 1 tablet (250 mg) by mouth 2 times daily   Quantity:  6 tablet   Refills:  0         These medicines have changed or have updated prescriptions.        Dose/Directions    senna-docusate 8.6-50 MG per tablet   Commonly known as:  SENOKOT-S;PERICOLACE   This may have changed:    - when to take this  - reasons to take this   Used for:  Incisional hernia, without obstruction or gangrene        Dose:  1 tablet   Take 1 tablet by mouth 2 times daily   Quantity:  30 tablet   Refills:  0            Where to get your medicines      These medications were sent to [id number] - [place] - [address]  [address] [place] [address]     Phone:  [phone]     ciprofloxacin 250 MG tablet                Primary Care Provider Office Phone # Fax #    Ame Villalobos [phone] [address]  [place] [address]      "   Equal Access to Services     [facility]: Hadii aad ku hadsarahmatthew Soaguila, waaxda luqadaha, qaybta kaalmasharlene lopez, quinton call. So [facility] [phone].    ATENCIÓN: Si habla español, tiene a perla disposición servicios gratuitos de asistencia lingüística. Llame al [phone].    We comply with applicable federal civil rights laws and [place] laws. We do not discriminate on the basis of race, color, national origin, age, disability, sex, sexual orientation, or gender identity.            Thank you!     Thank you for choosing [facility]  for your care. Our goal is always to provide you with excellent care. Hearing back from our patients is one way we can continue to improve our services. Please take a few minutes to complete the written survey that you may receive in the mail after your visit with us. Thank you!             Your Updated Medication List - Protect others around you: Learn how to safely use, store and throw away your medicines at www.disposemymeds.org.          This list is accurate as of: 10/24/17  2:46 PM.  Always use your most recent med list.                   Brand Name Dispense Instructions for use Diagnosis    ALLEGRA PO      Take 1 tablet by mouth daily        amLODIPine 2.5 MG tablet    NORVASC    90 tablet    Take 1 tablet (2.5 mg) by mouth daily    Essential hypertension       ciprofloxacin 250 MG tablet    CIPRO    6 tablet    Take 1 tablet (250 mg) by mouth 2 times daily    Dysuria       EFFEXOR XR PO      Take 300 mg by mouth daily (with breakfast) (Patient takes 3 x 75 mg = 225 mg dose)        EXCEDRIN PO      Take 1 tablet by mouth every morning (for morning headache)        hydrochlorothiazide 25 MG tablet    HYDRODIURIL    90 tablet    TAKE 1 TABLET DAILY    Essential hypertension       NONFORMULARY      Apply 1 drop to eye daily as needed (Dry eyes.) (Compounded eye drop.) ([facility])        polyethylene glycol powder    MIRALAX     510 g    Take 17 g by mouth daily as needed    Constipation       PRAVACHOL PO      Take 40 mg by mouth At Bedtime        PREVAGEN 10 MG Caps   Generic drug:  Apoaequorin      Take 1 capsule by mouth daily        PRINIVIL PO      Take 40 mg by mouth At Bedtime        senna-docusate 8.6-50 MG per tablet    SENOKOT-S;PERICOLACE    30 tablet    Take 1 tablet by mouth 2 times daily    Incisional hernia, without obstruction or gangrene       sulfamethoxazole-trimethoprim 800-160 MG per tablet    BACTRIM DS/SEPTRA DS    30 tablet    TAKE HALF TABLET BY MOUTH DAILY AS NEEDED AFTER INTERCOURSE TO PREVENT UTI    Recurrent UTI

## 2017-10-24 NOTE — PATIENT INSTRUCTIONS
Hold bactrim while on cipro for 3 days.  Take miralax for constipation.  If symptoms does not improve then let me know and we can order CT scan  Labs today  Schedule physical at her convenience  Seek sooner medical attention if there is any worsening of symptoms or problems.

## 2017-10-24 NOTE — PROGRESS NOTES
SUBJECTIVE:   Olivia Rodriguez is a 72 year old female who presents to clinic today for the following health issues:    This patient is accompanied in the office by her .  History is limited due to mild cognitive impairment  Her  helped with HPI    Abdominal Pain      Duration: started 3 weeks ago     Description (location/character/radiation): sides of abdomen        Associated flank pain: None    Intensity:  moderate    Accompanying signs and symptoms:        Fever/Chills: no        Gas/Bloating: no        Nausea/vomitting: no        Diarrhea: no        Dysuria or Hematuria: YES    History (previous similar pain/trauma/previous testing): Hernia surgery in May 2017    Precipitating or alleviating factors:       Pain worse with eating/BM/urination: no       Pain relieved by BM: no     Therapies tried and outcome: None    LMP:  not applicable    Patient received radiation on her stomach during childhood for a skin birthmark  Today comes in complaining of generalized pain on upper stomach  Reports frequent constipation that responds to Miralax but she doesn't take it on regular basis     Has hx of repaired incisional hernia     Memory  Diagnosed with mild cognitive impairment   Was seen by Dr Ledezma who referred her to OT  Both, patient and her , did not feel satisfied with the evaluation   Her psychiatrist will retire but has referred her to Dr. Forrester who is a psychiatrist that also specializes in memory and they seem very excited about this       URINARY TRACT SYMPTOMS      Duration: few weeks     Description  frequency    Intensity:  moderate    Accompanying signs and symptoms:  Fever/chills: no   Flank pain no   Nausea and vomiting: no   Vaginal symptoms: none  Abdominal/Pelvic Pain: YES- abdominal pain     History  History of frequent UTI's: YES  History of kidney stones: no   Sexually Active: YES  Possibility of pregnancy: No    Precipitating or alleviating factors: None    Therapies tried and  outcome: none   Outcome: n/a    Takes prophylactic Bactrim after sexual intercourse     Problem list and histories reviewed & adjusted, as indicated.  Additional history: as documented    Patient Active Problem List   Diagnosis     Major depressive disorder, recurrent episode, moderate (H)     TMJ (temporomandibular joint syndrome)     Hyperlipidemia LDL goal <130     S/P total knee arthroplasty     S/P HEATHER-BSO (total abdominal hysterectomy and bilateral salpingo-oophorectomy)     Sensation of feeling cold     Health Care Home     Anxiety     Invasive ductal carcinoma of breast (H)     Memory difficulty     Controlled substance agreement signed     Essential hypertension     Coronary artery calcification     Hyperlipidemia LDL goal <100     Lung nodule     Malignant neoplasm of upper-outer quadrant of right female breast (H)     Trigeminal neuralgia     Status post total bilateral knee replacement     Incisional hernia     Cognitive impairment     Past Surgical History:   Procedure Laterality Date     BIOPSY  13    right breast biopsy     BIOPSY  13    right axillary lymph node     BREAST SURGERY  13    right breast partial mastectomy/lumpectomy      SECTION       COLONOSCOPY      She had colonoscopy and endoscopy done on November 15, 2012 for workup of iron deficiency anemia and the results were satisfactory     GYN SURGERY           HEAD & NECK SURGERY      surgery for Trigeminal neuralgia       HYSTERECTOMY TOTAL ABDOMINAL, BILATERAL SALPINGO-OOPHORECTOMY, COMBINED       LAPAROSCOPIC HERNIORRHAPHY INCISIONAL N/A 4/10/2017    Procedure: LAPAROSCOPIC HERNIORRHAPHY INCISIONAL;  Surgeon: Dayanara Kendall MD;  Location: [facility]     MASTECTOMY PARTIAL WITH SENTINEL NODE  2013    Procedure: MASTECTOMY PARTIAL WITH SENTINEL NODE;  RIGHT PARTIAL MASTECTOMY WITH RIGHT AXILLARY SENTINEL NODE BIOPSY;  Surgeon: Kae Padilla MD;  Location: [facility]     ORTHOPEDIC SURGERY      Right TKA  and Left TKA       Social History   Substance Use Topics     Smoking status: Never Smoker     Smokeless tobacco: Never Used     Alcohol use 0.6 oz/week     1 Standard drinks or equivalent per week      Comment: 2-3 glasses     Family History   Problem Relation Age of Onset     Cardiovascular Father      Alzheimer Disease Mother      Cancer - colorectal No family hx of      Breast Cancer No family hx of          Current Outpatient Prescriptions   Medication Sig Dispense Refill     sulfamethoxazole-trimethoprim (BACTRIM DS/SEPTRA DS) 800-160 MG per tablet TAKE HALF TABLET BY MOUTH DAILY AS NEEDED AFTER INTERCOURSE TO PREVENT UTI 30 tablet 1     hydrochlorothiazide (HYDRODIURIL) 25 MG tablet TAKE 1 TABLET DAILY 90 tablet 3     Apoaequorin (PREVAGEN) 10 MG CAPS Take 1 capsule by mouth daily       senna-docusate (SENOKOT-S;PERICOLACE) 8.6-50 MG per tablet Take 1 tablet by mouth 2 times daily (Patient taking differently: Take 1 tablet by mouth as needed ) 30 tablet 0     Lisinopril (PRINIVIL PO) Take 40 mg by mouth At Bedtime       Pravastatin Sodium (PRAVACHOL PO) Take 40 mg by mouth At Bedtime       Venlafaxine HCl (EFFEXOR XR PO) Take 300 mg by mouth daily (with breakfast) (Patient takes 3 x 75 mg = 225 mg dose)        Aspirin-Acetaminophen-Caffeine (EXCEDRIN PO) Take 1 tablet by mouth every morning (for morning headache)       Fexofenadine HCl (ALLEGRA PO) Take 1 tablet by mouth daily       NONFORMULARY Apply 1 drop to eye daily as needed (Dry eyes.) (Compounded eye drop.) ([facility])       amLODIPine (NORVASC) 2.5 MG tablet Take 1 tablet (2.5 mg) by mouth daily 90 tablet 3     polyethylene glycol (MIRALAX) powder Take 17 g by mouth daily as needed 510 g 1     Allergies   Allergen Reactions     Seafood Anaphylaxis     Femara [Letrozole] Rash     Latex Rash     WITH LATEX BANDAIDS     Tamoxifen Rash     Labs reviewed in EPIC      Reviewed and updated as needed this visit by clinical staff     Reviewed and  "updated as needed this visit by Provider         ROS:  Constitutional, HEENT, cardiovascular, pulmonary, gi and gu systems are negative, except as otherwise noted.    This document serves as a record of the services and decisions personally performed and made by Ame Villalobos MD. It was created on her behalf by Kay Perdomo, a trained medical scribe. The creation of this document is based the provider's statements to the medical scribe.    Priscilla Perdomo 2:43 PM, October 24, 2017    OBJECTIVE:                                                    /80 (BP Location: Right arm, Cuff Size: Adult Large)  Pulse 106  Temp 99.2  F (37.3  C) (Oral)  Ht 5' 4\" (1.626 m)  Wt 176 lb (79.8 kg)  LMP 09/01/2000  SpO2 96%  Breastfeeding? No  BMI 30.21 kg/m2  Body mass index is 30.21 kg/(m^2).    GENERAL APPEARANCE: healthy, alert and no distress  EYES: Eyes grossly normal to inspection, PERRL and conjunctivae and sclerae normal  HENT: ear canals and TM's normal and nose and mouth without ulcers or lesions  NECK: no adenopathy  RESP: lungs clear to auscultation - no rales, rhonchi or wheezes  CV: regular rates and rhythm, normal S1 S2, no S3  ABDOMEN: soft, nontender, without hepatosplenomegaly or masses and bowel sounds normal  PSYCH: affect normal/bright, difficulty with word finding and remembering certain details       ASSESSMENT/PLAN:                                                    Olivia was seen today for uti and abdominal pain.    Diagnoses and all orders for this visit:    Dysuria  Mild positive test  Will treat given her symptoms of urinary frequency   Educated about this medication  She will hold prophylactic Bactrim while on Cipro  Update me on how you are doing  -     UA with Microscopic reflex to Culture  -     ciprofloxacin (CIPRO) 250 MG tablet; Take 1 tablet (250 mg) by mouth 2 times daily    Abdominal pain, generalized  Might be related to constipation  Exam was normal   Advised to take " "Miralax daily   If no improvement she should contact me to consider Abd CT given possible hx of \"abdominal radiation\"   -     CBC with platelets and differential  -     Comprehensive metabolic panel    Cognitive impairment is getting worse.  Was not please with occupational therapy evaluation  Her psychiatrist will retired but referred her to Dr. Forrester who is a psychiatrist that also specializes in memory and they seem very excited about this   She is considering moving to memory care facility with her  in the next few years     Screening for diabetes mellitus  -     Hemoglobin A1c  Will check just in case due to hx of urinary frequency    Other orders  -     PAF COMPLETED      Patient Instructions   Hold bactrim while on cipro for 3 days.  Take miralax for constipation.  If symptoms does not improve then let me know and we can order CT scan  Labs today  Seek sooner medical attention if there is any worsening of symptoms or problems.      The information in this document, created by the medical scribe for me, accurately reflects the services I personally performed and the decisions made by me. I have reviewed and approved this document for accuracy prior to leaving the patient care area.  Ame Villalobos MD  2:43 PM, 10/24/17  Ame Villalobos MD  [facility]  "

## 2017-10-25 LAB
ALBUMIN SERPL-MCNC: 4.2 G/DL (ref 3.4–5)
ALP SERPL-CCNC: 88 U/L (ref 40–150)
ALT SERPL W P-5'-P-CCNC: 39 U/L (ref 0–50)
ANION GAP SERPL CALCULATED.3IONS-SCNC: 8 MMOL/L (ref 3–14)
AST SERPL W P-5'-P-CCNC: 23 U/L (ref 0–45)
BILIRUB SERPL-MCNC: 0.2 MG/DL (ref 0.2–1.3)
BUN SERPL-MCNC: 27 MG/DL (ref 7–30)
CALCIUM SERPL-MCNC: 9.5 MG/DL (ref 8.5–10.1)
CHLORIDE SERPL-SCNC: 100 MMOL/L (ref 94–109)
CO2 SERPL-SCNC: 29 MMOL/L (ref 20–32)
CREAT SERPL-MCNC: 1.34 MG/DL (ref 0.52–1.04)
GFR SERPL CREATININE-BSD FRML MDRD: 39 ML/MIN/1.7M2
GLUCOSE SERPL-MCNC: 101 MG/DL (ref 70–99)
POTASSIUM SERPL-SCNC: 5.1 MMOL/L (ref 3.4–5.3)
PROT SERPL-MCNC: 7.5 G/DL (ref 6.8–8.8)
SODIUM SERPL-SCNC: 137 MMOL/L (ref 133–144)

## 2017-10-25 NOTE — PROGRESS NOTES
Gladys Baker,    This is to inform you regarding your test result.    Creatinine which is kidney function is elevated and GFR is low  You have chronic kidney disease  We will continue to monitor  Good control of blood pressure is important  Liver enzymes are normal  CBC result which includes white count Hemoglobin and  Platelet Counts is normal.   HbA1c which is average glucose during last 3 months is normal.          Sincerely,      Dr.Nasima Wilfredo MD,FACP

## 2017-11-08 ENCOUNTER — MEDICAL CORRESPONDENCE (OUTPATIENT)
Dept: HEALTH INFORMATION MANAGEMENT | Facility: CLINIC | Age: 72
End: 2017-11-08

## 2017-11-08 ENCOUNTER — TRANSFERRED RECORDS (OUTPATIENT)
Dept: HEALTH INFORMATION MANAGEMENT | Facility: CLINIC | Age: 72
End: 2017-11-08

## 2017-11-10 DIAGNOSIS — F03.90 SENILE DEMENTIA, UNCOMPLICATED (H): Primary | ICD-10-CM

## 2017-11-10 LAB
ERYTHROCYTE [DISTWIDTH] IN BLOOD BY AUTOMATED COUNT: 13 % (ref 10–15)
ERYTHROCYTE [SEDIMENTATION RATE] IN BLOOD BY WESTERGREN METHOD: 8 MM/H (ref 0–30)
FOLATE SERPL-MCNC: 19 NG/ML
HCT VFR BLD AUTO: 41.4 % (ref 35–47)
HGB BLD-MCNC: 13.4 G/DL (ref 11.7–15.7)
MCH RBC QN AUTO: 30.7 PG (ref 26.5–33)
MCHC RBC AUTO-ENTMCNC: 32.4 G/DL (ref 31.5–36.5)
MCV RBC AUTO: 95 FL (ref 78–100)
PLATELET # BLD AUTO: 346 10E9/L (ref 150–450)
RBC # BLD AUTO: 4.37 10E12/L (ref 3.8–5.2)
VIT B12 SERPL-MCNC: 771 PG/ML (ref 193–986)
WBC # BLD AUTO: 5.4 10E9/L (ref 4–11)

## 2017-11-10 PROCEDURE — 82746 ASSAY OF FOLIC ACID SERUM: CPT | Performed by: PSYCHIATRY & NEUROLOGY

## 2017-11-10 PROCEDURE — 85027 COMPLETE CBC AUTOMATED: CPT | Performed by: PSYCHIATRY & NEUROLOGY

## 2017-11-10 PROCEDURE — 83735 ASSAY OF MAGNESIUM: CPT | Performed by: PSYCHIATRY & NEUROLOGY

## 2017-11-10 PROCEDURE — 86780 TREPONEMA PALLIDUM: CPT | Performed by: PSYCHIATRY & NEUROLOGY

## 2017-11-10 PROCEDURE — 84443 ASSAY THYROID STIM HORMONE: CPT | Performed by: PSYCHIATRY & NEUROLOGY

## 2017-11-10 PROCEDURE — 80048 BASIC METABOLIC PNL TOTAL CA: CPT | Performed by: PSYCHIATRY & NEUROLOGY

## 2017-11-10 PROCEDURE — 85652 RBC SED RATE AUTOMATED: CPT | Performed by: PSYCHIATRY & NEUROLOGY

## 2017-11-10 PROCEDURE — 82306 VITAMIN D 25 HYDROXY: CPT | Performed by: PSYCHIATRY & NEUROLOGY

## 2017-11-10 PROCEDURE — 84100 ASSAY OF PHOSPHORUS: CPT | Performed by: PSYCHIATRY & NEUROLOGY

## 2017-11-10 PROCEDURE — 36415 COLL VENOUS BLD VENIPUNCTURE: CPT | Performed by: PSYCHIATRY & NEUROLOGY

## 2017-11-10 PROCEDURE — 86618 LYME DISEASE ANTIBODY: CPT | Performed by: PSYCHIATRY & NEUROLOGY

## 2017-11-10 PROCEDURE — 82607 VITAMIN B-12: CPT | Performed by: PSYCHIATRY & NEUROLOGY

## 2017-11-11 LAB
ANION GAP SERPL CALCULATED.3IONS-SCNC: 8 MMOL/L (ref 3–14)
B BURGDOR IGG+IGM SER QL: 0.17 (ref 0–0.89)
BUN SERPL-MCNC: 25 MG/DL (ref 7–30)
CALCIUM SERPL-MCNC: 9.6 MG/DL (ref 8.5–10.1)
CHLORIDE SERPL-SCNC: 100 MMOL/L (ref 94–109)
CO2 SERPL-SCNC: 27 MMOL/L (ref 20–32)
CREAT SERPL-MCNC: 0.83 MG/DL (ref 0.52–1.04)
DEPRECATED CALCIDIOL+CALCIFEROL SERPL-MC: 51 UG/L (ref 20–75)
GFR SERPL CREATININE-BSD FRML MDRD: 67 ML/MIN/1.7M2
GLUCOSE SERPL-MCNC: 97 MG/DL (ref 70–99)
MAGNESIUM SERPL-MCNC: 2.3 MG/DL (ref 1.6–2.3)
PHOSPHATE SERPL-MCNC: 2.9 MG/DL (ref 2.5–4.5)
POTASSIUM SERPL-SCNC: 4.1 MMOL/L (ref 3.4–5.3)
SODIUM SERPL-SCNC: 135 MMOL/L (ref 133–144)
T PALLIDUM IGG+IGM SER QL: NEGATIVE
TSH SERPL DL<=0.005 MIU/L-ACNC: 2.51 MU/L (ref 0.4–4)

## 2017-11-21 ENCOUNTER — TRANSFERRED RECORDS (OUTPATIENT)
Dept: HEALTH INFORMATION MANAGEMENT | Facility: CLINIC | Age: 72
End: 2017-11-21

## 2017-12-11 DIAGNOSIS — E78.5 HYPERLIPIDEMIA LDL GOAL <100: ICD-10-CM

## 2017-12-13 NOTE — TELEPHONE ENCOUNTER
It won't let me sign to her [facility] pharmacy. Please clarify which pharmacy. Thanks

## 2017-12-13 NOTE — TELEPHONE ENCOUNTER
Routing refill request to provider for review/approval because:  Medication is reported/historical    Pravastatin     Please review and authorize if appropriate,     Thank you,   Meredith PEÑALOZA RN

## 2017-12-19 ENCOUNTER — DOCUMENTATION ONLY (OUTPATIENT)
Dept: OTHER | Facility: CLINIC | Age: 72
End: 2017-12-19

## 2017-12-19 PROBLEM — Z71.89 ADVANCED DIRECTIVES, COUNSELING/DISCUSSION: Chronic | Status: ACTIVE | Noted: 2017-12-19

## 2018-01-02 DIAGNOSIS — R30.0 DYSURIA: ICD-10-CM

## 2018-01-02 NOTE — TELEPHONE ENCOUNTER
Requested Prescriptions   Pending Prescriptions Disp Refills     ciprofloxacin (CIPRO) 250 MG tablet [Pharmacy Med Name: Ciprofloxacin HCl Oral Tablet 250 MG] 6 tablet 0     Sig: Take 1 tablet (250 mg) by mouth 2 times daily    There is no refill protocol information for this order        ciprofloxacin (CIPRO) 250 MG tablet      Last Written Prescription Date:  10/24/17  Last Fill Quantity: 6 tablet,   # refills: 0  Last Office Visit: 10/24/17  Future Office visit:       Routing refill request to provider for review/approval because:  Drug not on the [facility], [facility] or [facility] refill protocol or controlled substance

## 2018-01-03 RX ORDER — CIPROFLOXACIN 250 MG/1
TABLET, FILM COATED ORAL
Qty: 6 TABLET | Refills: 0
Start: 2018-01-03

## 2018-01-03 NOTE — TELEPHONE ENCOUNTER
"Request for Cipro rec'd from pharmacy.     Called patient, spoke on speaker phone with patient and .     Getting up every hour or more to urinate in the night. It is \"destroying her sleep, she cannot sleep.\"     Denies burning, flank pain, fevers/chills, hematuria.     Per /patient: Dx with memory issues, has been forgetting to take 1/2 of Bactrim after intercourse for the \"last few months\"     Suggested Urgent Care as team is full, but patient requested to be seem tomorrow morning. Appointment scheduled for 0930.     Instructed patient to drink plenty of fluids and to call/come to UC if symptoms worsen (ie fever develops).     Meredith SULLIVAN RN      "

## 2018-01-04 ENCOUNTER — OFFICE VISIT (OUTPATIENT)
Dept: FAMILY MEDICINE | Facility: CLINIC | Age: 73
End: 2018-01-04
Payer: COMMERCIAL

## 2018-01-04 VITALS
WEIGHT: 175 LBS | TEMPERATURE: 98.1 F | OXYGEN SATURATION: 97 % | BODY MASS INDEX: 29.88 KG/M2 | HEIGHT: 64 IN | HEART RATE: 107 BPM | DIASTOLIC BLOOD PRESSURE: 93 MMHG | SYSTOLIC BLOOD PRESSURE: 158 MMHG

## 2018-01-04 DIAGNOSIS — E78.5 HYPERLIPIDEMIA LDL GOAL <100: ICD-10-CM

## 2018-01-04 DIAGNOSIS — R30.0 DYSURIA: Primary | ICD-10-CM

## 2018-01-04 LAB
ALBUMIN UR-MCNC: 100 MG/DL
APPEARANCE UR: CLEAR
BACTERIA #/AREA URNS HPF: ABNORMAL /HPF
BILIRUB UR QL STRIP: NEGATIVE
COLOR UR AUTO: YELLOW
GLUCOSE UR STRIP-MCNC: NEGATIVE MG/DL
HGB UR QL STRIP: ABNORMAL
HYALINE CASTS #/AREA URNS LPF: ABNORMAL /LPF
KETONES UR STRIP-MCNC: ABNORMAL MG/DL
LEUKOCYTE ESTERASE UR QL STRIP: NEGATIVE
MUCOUS THREADS #/AREA URNS LPF: PRESENT /LPF
NITRATE UR QL: NEGATIVE
NON-SQ EPI CELLS #/AREA URNS LPF: ABNORMAL /LPF
PH UR STRIP: 5.5 PH (ref 5–7)
RBC #/AREA URNS AUTO: ABNORMAL /HPF
SOURCE: ABNORMAL
SP GR UR STRIP: 1.02 (ref 1–1.03)
UROBILINOGEN UR STRIP-ACNC: 0.2 EU/DL (ref 0.2–1)
WBC #/AREA URNS AUTO: ABNORMAL /HPF

## 2018-01-04 PROCEDURE — 81001 URINALYSIS AUTO W/SCOPE: CPT | Performed by: NURSE PRACTITIONER

## 2018-01-04 PROCEDURE — 99213 OFFICE O/P EST LOW 20 MIN: CPT | Performed by: NURSE PRACTITIONER

## 2018-01-04 RX ORDER — DONEPEZIL HYDROCHLORIDE 5 MG/1
5 TABLET, FILM COATED ORAL DAILY
COMMUNITY
Start: 2017-12-13 | End: 2018-06-29

## 2018-01-04 RX ORDER — ALPRAZOLAM 0.25 MG
0.25 TABLET ORAL DAILY
COMMUNITY
Start: 2017-12-11 | End: 2018-06-29

## 2018-01-04 RX ORDER — PRAVASTATIN SODIUM 40 MG
40 TABLET ORAL AT BEDTIME
Qty: 90 TABLET | Refills: 3 | Status: SHIPPED | OUTPATIENT
Start: 2018-01-04 | End: 2018-09-28

## 2018-01-04 NOTE — NURSING NOTE
"Chief Complaint   Patient presents with     Urinary Problem       Initial BP (!) 158/93 (BP Location: Right arm, Cuff Size: Adult Large)  Pulse 107  Temp 98.1  F (36.7  C) (Oral)  Ht 5' 4\" (1.626 m)  Wt 175 lb (79.4 kg)  LMP 09/01/2000  SpO2 97%  BMI 30.04 kg/m2 Estimated body mass index is 30.04 kg/(m^2) as calculated from the following:    Height as of this encounter: 5' 4\" (1.626 m).    Weight as of this encounter: 175 lb (79.4 kg).  Medication Reconciliation: complete      Evelia Cristina CMA      "

## 2018-01-04 NOTE — PROGRESS NOTES
HPI      SUBJECTIVE:   Olivia Rodriguez is a 72 year old female who presents to clinic today for the following health issues:      URINARY TRACT SYMPTOMS      Duration: Over one week    Description  frequency and urgency    Intensity:  moderate    Accompanying signs and symptoms:  Fever/chills: no   Flank pain no   Nausea and vomiting: no   Vaginal symptoms: none  Abdominal/Pelvic Pain: no     History  History of frequent UTI's: no   History of kidney stones: no   Sexually Active: Yes  Possibility of pregnancy: No    Precipitating or alleviating factors: None    Therapies tried and outcome: none   Outcome: NA    Here with    Urinary frequency    Has missed some doses of postcoital abx which could be the cause of her symptoms       Past Medical History:   Diagnosis Date     Anxiety      Arthritis      Cancer (H) Aug 2013    right breast     Hypertension      Iron deficiency anaemia     Had endoscopy and colonoscopy done in 2012      Memory loss     pt states worse with last surgery      TMJ arthralgia      Past Surgical History:   Procedure Laterality Date     BIOPSY  13    right breast biopsy     BIOPSY  13    right axillary lymph node     BREAST SURGERY  13    right breast partial mastectomy/lumpectomy      SECTION       COLONOSCOPY      She had colonoscopy and endoscopy done on November 15, 2012 for workup of iron deficiency anemia and the results were satisfactory     GYN SURGERY           HEAD & NECK SURGERY      surgery for Trigeminal neuralgia       HYSTERECTOMY TOTAL ABDOMINAL, BILATERAL SALPINGO-OOPHORECTOMY, COMBINED       LAPAROSCOPIC HERNIORRHAPHY INCISIONAL N/A 4/10/2017    Procedure: LAPAROSCOPIC HERNIORRHAPHY INCISIONAL;  Surgeon: Dayanara Kendall MD;  Location: [facility]     MASTECTOMY PARTIAL WITH SENTINEL NODE  2013    Procedure: MASTECTOMY PARTIAL WITH SENTINEL NODE;  RIGHT PARTIAL MASTECTOMY WITH RIGHT AXILLARY SENTINEL NODE BIOPSY;  Surgeon:  Kae Padilla MD;  Location: [facility]     ORTHOPEDIC SURGERY      Right TKA and Left TKA     Social History   Substance Use Topics     Smoking status: Never Smoker     Smokeless tobacco: Never Used     Alcohol use 0.6 oz/week     1 Standard drinks or equivalent per week      Comment: 2-3 glasses     Current Outpatient Prescriptions   Medication Sig Dispense Refill     sulfamethoxazole-trimethoprim (BACTRIM DS/SEPTRA DS) 800-160 MG per tablet TAKE HALF TABLET BY MOUTH DAILY AS NEEDED AFTER INTERCOURSE TO PREVENT UTI 30 tablet 1     hydrochlorothiazide (HYDRODIURIL) 25 MG tablet TAKE 1 TABLET DAILY 90 tablet 3     Apoaequorin (PREVAGEN) 10 MG CAPS Take 1 capsule by mouth daily       senna-docusate (SENOKOT-S;PERICOLACE) 8.6-50 MG per tablet Take 1 tablet by mouth 2 times daily (Patient taking differently: Take 1 tablet by mouth as needed ) 30 tablet 0     Lisinopril (PRINIVIL PO) Take 40 mg by mouth At Bedtime       Pravastatin Sodium (PRAVACHOL PO) Take 40 mg by mouth At Bedtime       Venlafaxine HCl (EFFEXOR XR PO) Take 300 mg by mouth daily (with breakfast) (Patient takes 3 x 75 mg = 225 mg dose)        Aspirin-Acetaminophen-Caffeine (EXCEDRIN PO) Take 1 tablet by mouth every morning (for morning headache)       Fexofenadine HCl (ALLEGRA PO) Take 1 tablet by mouth daily       NONFORMULARY Apply 1 drop to eye daily as needed (Dry eyes.) (Compounded eye drop.) ([facility])       amLODIPine (NORVASC) 2.5 MG tablet Take 1 tablet (2.5 mg) by mouth daily 90 tablet 3     polyethylene glycol (MIRALAX) powder Take 17 g by mouth daily as needed 510 g 1     donepezil (ARICEPT) 5 MG tablet Take 5 mg by mouth daily       ALPRAZolam (XANAX) 0.25 MG tablet Take 0.25 mg by mouth daily       Allergies   Allergen Reactions     Seafood Anaphylaxis     Femara [Letrozole] Rash     Latex Rash     WITH LATEX BANDAIDS     Tamoxifen Rash       Reviewed and updated as needed this visit by clinical staff and  "provider      ROS  Detailed as above       BP (!) 158/93 (BP Location: Right arm, Cuff Size: Adult Large)  Pulse 107  Temp 98.1  F (36.7  C) (Oral)  Ht 5' 4\" (1.626 m)  Wt 175 lb (79.4 kg)  LMP 09/01/2000  SpO2 97%  BMI 30.04 kg/m2      Physical Exam   Constitutional: She is well-developed, well-nourished, and in no distress.   HENT:   Head: Normocephalic.   Pulmonary/Chest: Effort normal.   Neurological: She is alert.   Psychiatric: Mood and affect normal.   Vitals reviewed.        Assessment and Plan:       ICD-10-CM    1. Dysuria R30.0 UA reflex to Microscopic and Culture     Urine Microscopic   2. Hyperlipidemia LDL goal <100 E78.5 pravastatin (PRAVACHOL) 40 MG tablet       Urinary frequency with a negative UA today for infeciton   She is dehydrated with ketones   Protein and hyaline casts are present. Recommend repeat in about 1 month   She should push fluids   Continue with postcoital abx       CARY Bailey, CNP  [facility]    "

## 2018-01-04 NOTE — MR AVS SNAPSHOT
"              After Visit Summary   1/4/2018    Olivia Rodriguez    MRN: [id number]           Patient Information     Date Of Birth          1945        Visit Information        Provider Department      1/4/2018 9:30 AM Emperatriz Porter APRN CNP [facility]        Today's Diagnoses     Dysuria    -  1    Hyperlipidemia LDL goal <100           Follow-ups after your visit        Who to contact     If you have questions or need follow up information about today's clinic visit or your schedule please contact [facility] directly at [phone].  Normal or non-critical lab and imaging results will be communicated to you by [place], letter or phone within 4 business days after the clinic has received the results. If you do not hear from us within 7 days, please contact the clinic through [place] or phone. If you have a critical or abnormal lab result, we will notify you by phone as soon as possible.  Submit refill requests through [facility] or call your pharmacy and they will forward the refill request to us. Please allow 3 business days for your refill to be completed.          Additional Information About Your Visit        MyChart Information     [facility] gives you secure access to your electronic health record. If you see a primary care provider, you can also send messages to your care team and make appointments. If you have questions, please call your primary care clinic.  If you do not have a primary care provider, please call [phone] and they will assist you.        Care EveryWhere ID     This is your Care EveryWhere ID. This could be used by other organizations to access your [place] medical records  [id number]        Your Vitals Were     Pulse Temperature Height Last Period Pulse Oximetry BMI (Body Mass Index)    107 98.1  F (36.7  C) (Oral) 5' 4\" (1.626 m) 09/01/2000 97% 30.04 kg/m2       Blood Pressure from Last 3 Encounters:   01/04/18 (!) 158/93   10/24/17 136/80   06/21/17 " 108/66    Weight from Last 3 Encounters:   01/04/18 175 lb (79.4 kg)   10/24/17 176 lb (79.8 kg)   06/21/17 164 lb 14.4 oz (74.8 kg)              We Performed the Following     UA reflex to Microscopic and Culture     Urine Microscopic          Today's Medication Changes          These changes are accurate as of: 1/4/18 11:59 PM.  If you have any questions, ask your nurse or doctor.               These medicines have changed or have updated prescriptions.        Dose/Directions    pravastatin 40 MG tablet   Commonly known as:  PRAVACHOL   This may have changed:  medication strength   Used for:  Hyperlipidemia LDL goal <100   Changed by:  Emperatriz Porter APRN CNP        Dose:  40 mg   Take 1 tablet (40 mg) by mouth At Bedtime   Quantity:  90 tablet   Refills:  3       senna-docusate 8.6-50 MG per tablet   Commonly known as:  SENOKOT-S;PERICOLACE   This may have changed:    - when to take this  - reasons to take this   Used for:  Incisional hernia, without obstruction or gangrene        Dose:  1 tablet   Take 1 tablet by mouth 2 times daily   Quantity:  30 tablet   Refills:  0            Where to get your medicines      These medications were sent to [facility] - [address] [address]     Phone:  [phone]     pravastatin 40 MG tablet                Primary Care Provider Office Phone # Fax #    Ame MONDRAGON MD Wilfredo [phone] [address] [facility] [address]  [facility] [address]        Equal Access to Services     CASI POSADA AH: Hadii aad ku hadasho Soaguila, waaxda luqadaha, qaybta kaalmada adeegyada, waxay zuhair davila . So [facility] [phone].    ATENCIÓN: Si habla español, tiene a perla disposición servicios gratuitos de asistencia lingüística. Llame al [phone].    We comply with applicable federal civil rights laws and [place] laws. We do not discriminate on the basis of race,  color, national origin, age, disability, sex, sexual orientation, or gender identity.            Thank you!     Thank you for choosing [facility]  for your care. Our goal is always to provide you with excellent care. Hearing back from our patients is one way we can continue to improve our services. Please take a few minutes to complete the written survey that you may receive in the mail after your visit with us. Thank you!             Your Updated Medication List - Protect others around you: Learn how to safely use, store and throw away your medicines at www.disposemymeds.org.          This list is accurate as of: 1/4/18 11:59 PM.  Always use your most recent med list.                   Brand Name Dispense Instructions for use Diagnosis    ALLEGRA PO      Take 1 tablet by mouth daily        ALPRAZolam 0.25 MG tablet    XANAX     Take 0.25 mg by mouth daily        amLODIPine 2.5 MG tablet    NORVASC    90 tablet    Take 1 tablet (2.5 mg) by mouth daily    Essential hypertension       donepezil 5 MG tablet    ARIcept     Take 5 mg by mouth daily        EFFEXOR XR PO      Take 300 mg by mouth daily (with breakfast) (Patient takes 3 x 75 mg = 225 mg dose)        EXCEDRIN PO      Take 1 tablet by mouth every morning (for morning headache)        hydrochlorothiazide 25 MG tablet    HYDRODIURIL    90 tablet    TAKE 1 TABLET DAILY    Essential hypertension       NONFORMULARY      Apply 1 drop to eye daily as needed (Dry eyes.) (Compounded eye drop.) ([facility])        polyethylene glycol powder    MIRALAX    510 g    Take 17 g by mouth daily as needed    Constipation       pravastatin 40 MG tablet    PRAVACHOL    90 tablet    Take 1 tablet (40 mg) by mouth At Bedtime    Hyperlipidemia LDL goal <100       PREVAGEN 10 MG Caps   Generic drug:  Apoaequorin      Take 1 capsule by mouth daily        PRINIVIL PO      Take 40 mg by mouth At Bedtime        senna-docusate 8.6-50 MG per tablet     SENOKOT-S;PERICOLACE    30 tablet    Take 1 tablet by mouth 2 times daily    Incisional hernia, without obstruction or gangrene       sulfamethoxazole-trimethoprim 800-160 MG per tablet    BACTRIM DS/SEPTRA DS    30 tablet    TAKE HALF TABLET BY MOUTH DAILY AS NEEDED AFTER INTERCOURSE TO PREVENT UTI    Recurrent UTI

## 2018-01-11 RX ORDER — PRAVASTATIN SODIUM 40 MG
40 TABLET ORAL DAILY
Qty: 90 TABLET | Refills: 1 | COMMUNITY
Start: 2018-01-11

## 2018-01-29 ENCOUNTER — TRANSFERRED RECORDS (OUTPATIENT)
Dept: HEALTH INFORMATION MANAGEMENT | Facility: CLINIC | Age: 73
End: 2018-01-29

## 2018-02-01 ENCOUNTER — HOSPITAL ENCOUNTER (OUTPATIENT)
Dept: MAMMOGRAPHY | Facility: CLINIC | Age: 73
End: 2018-02-01
Attending: INTERNAL MEDICINE
Payer: COMMERCIAL

## 2018-02-01 ENCOUNTER — HOSPITAL ENCOUNTER (OUTPATIENT)
Dept: MAMMOGRAPHY | Facility: CLINIC | Age: 73
Discharge: HOME | End: 2018-02-01
Attending: INTERNAL MEDICINE | Admitting: INTERNAL MEDICINE
Payer: COMMERCIAL

## 2018-02-01 DIAGNOSIS — N64.4 BREAST PAIN, RIGHT: ICD-10-CM

## 2018-02-01 PROCEDURE — 76642 ULTRASOUND BREAST LIMITED: CPT | Mod: RT

## 2018-02-01 PROCEDURE — G0279 TOMOSYNTHESIS, MAMMO: HCPCS

## 2018-05-04 ENCOUNTER — OFFICE VISIT (OUTPATIENT)
Dept: FAMILY MEDICINE | Facility: CLINIC | Age: 73
End: 2018-05-04
Payer: COMMERCIAL

## 2018-05-04 ENCOUNTER — TELEPHONE (OUTPATIENT)
Dept: FAMILY MEDICINE | Facility: CLINIC | Age: 73
End: 2018-05-04

## 2018-05-04 VITALS
SYSTOLIC BLOOD PRESSURE: 156 MMHG | TEMPERATURE: 98.7 F | DIASTOLIC BLOOD PRESSURE: 93 MMHG | WEIGHT: 167 LBS | HEART RATE: 99 BPM | OXYGEN SATURATION: 97 % | BODY MASS INDEX: 28.51 KG/M2 | HEIGHT: 64 IN

## 2018-05-04 DIAGNOSIS — F33.1 MAJOR DEPRESSIVE DISORDER, RECURRENT EPISODE, MODERATE (H): Primary | ICD-10-CM

## 2018-05-04 DIAGNOSIS — R41.89 COGNITIVE IMPAIRMENT: ICD-10-CM

## 2018-05-04 PROCEDURE — 99214 OFFICE O/P EST MOD 30 MIN: CPT | Performed by: NURSE PRACTITIONER

## 2018-05-04 RX ORDER — ESCITALOPRAM OXALATE 10 MG/1
5 TABLET ORAL DAILY
Qty: 45 TABLET | Refills: 1 | Status: SHIPPED | OUTPATIENT
Start: 2018-05-04 | End: 2018-09-28

## 2018-05-04 NOTE — PATIENT INSTRUCTIONS
We will start a low dose of Lexapro, another antidepressant.     The 24-7 Alzheimer's Association Hotline is: 1-989.443.3411.   We are located in the [facility].   The Alzheimer's Association helps anyone with memory loss, even if they don't have Alzheimer's Disease.     Alzheimer's Association Local Chapter   [phone] (phone)   [address] [place] [address]. [address] [place] [address]     Take a look at programs they may have. When you call ask for a care consultation.

## 2018-05-04 NOTE — PROGRESS NOTES
"HPI      SUBJECTIVE:   Olivia Rodriguez is a 73 year old female who presents to clinic today for the following health issues:      Depression Followup    Status since last visit: Worsened     See PHQ-9 for current symptoms.  Other associated symptoms: None    Complicating factors:   Significant life event:  No   Current substance abuse:  None  Anxiety or Panic symptoms:  Anxiety    PHQ-9 3/27/2017 10/24/2017 2018   Total Score 3 0 14   Q9: Suicide Ideation Not at all Not at all Not at all       PHQ-9  English  PHQ-9   Any Language  Suicide Assessment Five-step Evaluation and Treatment (SAFE-T)    Majority of history gathered from  d/t memory loss.     \"can't cope.\" \"life is too hard\"  Has gone to the [place] memory clinic. Started Aricept about 6 months ago   Has had boughts of depression all her life   Increased effexor about 8 months ago   Has an appt  1pm with Dr Ledezma for memory clinic   Sleeping great   Not interested in eating but will eat   Not doing much during the day and stays at home    is very active and has to cut back to take care of her. They have a great relationship       Past Medical History:   Diagnosis Date     Anxiety      Arthritis      Cancer (H) Aug 2013    right breast     Hypertension      Iron deficiency anaemia     Had endoscopy and colonoscopy done in 2012      Memory loss     pt states worse with last surgery      TMJ arthralgia      Family History   Problem Relation Age of Onset     Cardiovascular Father      Alzheimer Disease Mother      Cancer - colorectal No family hx of      Breast Cancer No family hx of      Past Surgical History:   Procedure Laterality Date     BIOPSY  13    right breast biopsy     BIOPSY  13    right axillary lymph node     BREAST SURGERY  13    right breast partial mastectomy/lumpectomy      SECTION       COLONOSCOPY      She had colonoscopy and endoscopy done on November 15, 2012 for workup of iron " deficiency anemia and the results were satisfactory     GYN SURGERY           HEAD & NECK SURGERY      surgery for Trigeminal neuralgia       HYSTERECTOMY TOTAL ABDOMINAL, BILATERAL SALPINGO-OOPHORECTOMY, COMBINED       LAPAROSCOPIC HERNIORRHAPHY INCISIONAL N/A 4/10/2017    Procedure: LAPAROSCOPIC HERNIORRHAPHY INCISIONAL;  Surgeon: Dayanara Kendall MD;  Location: [facility]     MASTECTOMY PARTIAL WITH SENTINEL NODE  2013    Procedure: MASTECTOMY PARTIAL WITH SENTINEL NODE;  RIGHT PARTIAL MASTECTOMY WITH RIGHT AXILLARY SENTINEL NODE BIOPSY;  Surgeon: Kae Padilla MD;  Location: [facility]     ORTHOPEDIC SURGERY      Right TKA and Left TKA     Social History   Substance Use Topics     Smoking status: Never Smoker     Smokeless tobacco: Never Used     Alcohol use 0.6 oz/week     1 Standard drinks or equivalent per week      Comment: 2-3 glasses     Current Outpatient Prescriptions   Medication Sig Dispense Refill     ALPRAZolam (XANAX) 0.25 MG tablet Take 0.25 mg by mouth daily       amLODIPine (NORVASC) 2.5 MG tablet Take 1 tablet (2.5 mg) by mouth daily 90 tablet 3     Apoaequorin (PREVAGEN) 10 MG CAPS Take 1 capsule by mouth daily       Aspirin-Acetaminophen-Caffeine (EXCEDRIN PO) Take 1 tablet by mouth every morning (for morning headache)       donepezil (ARICEPT) 5 MG tablet Take 5 mg by mouth daily       escitalopram (LEXAPRO) 10 MG tablet Take 0.5 tablets (5 mg) by mouth daily 45 tablet 1     Fexofenadine HCl (ALLEGRA PO) Take 1 tablet by mouth daily       hydrochlorothiazide (HYDRODIURIL) 25 MG tablet TAKE 1 TABLET DAILY 90 tablet 3     Lisinopril (PRINIVIL PO) Take 40 mg by mouth At Bedtime       NONFORMULARY Apply 1 drop to eye daily as needed (Dry eyes.) (Compounded eye drop.) ([facility])       polyethylene glycol (MIRALAX) powder Take 17 g by mouth daily as needed 510 g 1     pravastatin (PRAVACHOL) 40 MG tablet Take 1 tablet (40 mg) by mouth At Bedtime 90 tablet 3     senna-docusate  "(SENOKOT-S;PERICOLACE) 8.6-50 MG per tablet Take 1 tablet by mouth 2 times daily (Patient taking differently: Take 1 tablet by mouth as needed ) 30 tablet 0     sulfamethoxazole-trimethoprim (BACTRIM DS/SEPTRA DS) 800-160 MG per tablet TAKE HALF TABLET BY MOUTH DAILY AS NEEDED AFTER INTERCOURSE TO PREVENT UTI 30 tablet 1     Venlafaxine HCl (EFFEXOR XR PO) Take 300 mg by mouth daily (with breakfast) (Patient takes 3 x 75 mg = 225 mg dose)        Allergies   Allergen Reactions     Seafood Anaphylaxis     Femara [Letrozole] Rash     Latex Rash     WITH LATEX BANDAIDS     Tamoxifen Rash       Reviewed and updated as needed this visit by clinical staff and provider      Review of Systems   Unable to perform ROS: Dementia           BP (!) 156/93 (BP Location: Right arm, Cuff Size: Adult Large)  Pulse 99  Temp 98.7  F (37.1  C) (Oral)  Ht 5' 4\" (1.626 m)  Wt 167 lb (75.8 kg)  LMP 09/01/2000  SpO2 97%  BMI 28.67 kg/m2      Physical Exam   Constitutional: She is well-developed, well-nourished, and in no distress.   Neurological: She is alert.   Psychiatric: She exhibits a depressed mood. She has a flat affect.   Forgetful    Vitals reviewed.      Assessment and Plan:       ICD-10-CM    1. Major depressive disorder, recurrent episode, moderate (H) F33.1 escitalopram (LEXAPRO) 10 MG tablet   2. Cognitive impairment R41.89        Worsened depression in setting of alzheimer's type history. Has f/u appt with memory clinic next month. We will start low dose lexapro to see if that helps. Also recommend she f/u with alzheimer's association. I think she would benefit from daily activities as she hasn't been doing much.       The total visit time was 25 minutes more  than 50% was spent in counseling and coordination of care as discussed above.     Emperatriz Porter, CARY, CNP  [facility]    "

## 2018-05-04 NOTE — MR AVS SNAPSHOT
After Visit Summary   5/4/2018    Olivia Rodriguez    MRN: [id number]           Patient Information     Date Of Birth          1945        Visit Information        Provider Department      5/4/2018 11:00 AM Emperatriz Porter APRN CNP [facility]        Today's Diagnoses     Major depressive disorder, recurrent episode, moderate (H)    -  1    Cognitive impairment          Care Instructions    We will start a low dose of Lexapro, another antidepressant.     The 24-7 Alzheimer's Association Hotline is: 1-145.346.8514.   We are located in the [facility].   The Alzheimer's Association helps anyone with memory loss, even if they don't have Alzheimer's Disease.     Alzheimer's Association Local Chapter   [phone] (phone)   [address]   [place] [address]     Take a look at programs they may have. When you call ask for a care consultation.               Follow-ups after your visit        Your next 10 appointments already scheduled     Jun 29, 2018  1:00 PM CDT   Office Visit with Hilary Ledezma,    [facility] ([facility])    [address] [address] [phone]           Bring a current list of meds and any records pertaining to this visit. For Physicals, please bring immunization records and any forms needing to be filled out. Please arrive 10 minutes early to complete paperwork.              Who to contact     If you have questions or need follow up information about today's clinic visit or your schedule please contact [facility] directly at [phone].  Normal or non-critical lab and imaging results will be communicated to you by MyChart, letter or phone within 4 business days after the clinic has received the results. If you do not hear from us within 7 days, please contact the clinic through MyChart or phone. If you have a critical or abnormal lab result, we will notify you by  "phone as soon as possible.  Submit refill requests through [employer] or call your pharmacy and they will forward the refill request to us. Please allow 3 business days for your refill to be completed.          Additional Information About Your Visit        [employer] Information     [employer] gives you secure access to your electronic health record. If you see a primary care provider, you can also send messages to your care team and make appointments. If you have questions, please call your primary care clinic.  If you do not have a primary care provider, please call [phone] and they will assist you.        Care EveryWhere ID     This is your Care EveryWhere ID. This could be used by other organizations to access your [facility] medical records  [id number]        Your Vitals Were     Pulse Temperature Height Last Period Pulse Oximetry BMI (Body Mass Index)    99 98.7  F (37.1  C) (Oral) 5' 4\" (1.626 m) 09/01/2000 97% 28.67 kg/m2       Blood Pressure from Last 3 Encounters:   05/04/18 (!) 156/93   01/04/18 (!) 158/93   10/24/17 136/80    Weight from Last 3 Encounters:   05/04/18 167 lb (75.8 kg)   01/04/18 175 lb (79.4 kg)   10/24/17 176 lb (79.8 kg)              Today, you had the following     No orders found for display         Today's Medication Changes          These changes are accurate as of 5/4/18 11:45 AM.  If you have any questions, ask your nurse or doctor.               Start taking these medicines.        Dose/Directions    escitalopram 10 MG tablet   Commonly known as:  LEXAPRO   Used for:  Major depressive disorder, recurrent episode, moderate (H)   Started by:  Emperatriz Porter APRN CNP        Dose:  5 mg   Take 0.5 tablets (5 mg) by mouth daily   Quantity:  45 tablet   Refills:  1         These medicines have changed or have updated prescriptions.        Dose/Directions    senna-docusate 8.6-50 MG per tablet   Commonly known as:  SENOKOT-S;PERICOLACE   This may have changed:    - when to take " this  - reasons to take this   Used for:  Incisional hernia, without obstruction or gangrene        Dose:  1 tablet   Take 1 tablet by mouth 2 times daily   Quantity:  30 tablet   Refills:  0            Where to get your medicines      These medications were sent to [id number] - [place], [place] - [address]  [address] [place] [address]     Phone:  [phone]     escitalopram 10 MG tablet                Primary Care Provider Office Phone # Fax #    Ame Villalobos [phone] [address]  [place]                [place] [address]        Equal Access to Services     [facility]: Hadii aad ku hadasho Onel, waaxda luqadaha, qaybta kaalmada adeizabellayada, quinton davila . [facility] [phone].    ATENCIÓN: Si habla español, tiene a perla disposición servicios gratuitos de asistencia lingüística. [facility] [phone].    We comply with applicable federal civil rights laws and [place] laws. We do not discriminate on the basis of race, color, national origin, age, disability, sex, sexual orientation, or gender identity.            Thank you!     Thank you for choosing [facility]  for your care. Our goal is always to provide you with excellent care. Hearing back from our patients is one way we can continue to improve our services. Please take a few minutes to complete the written survey that you may receive in the mail after your visit with us. Thank you!             Your Updated Medication List - Protect others around you: Learn how to safely use, store and throw away your medicines at www.disposemymeds.org.          This list is accurate as of 5/4/18 11:45 AM.  Always use your most recent med list.                   Brand Name Dispense Instructions for use Diagnosis    ALLEGRA PO      Take 1 tablet by mouth daily        ALPRAZolam 0.25 MG tablet    XANAX     Take 0.25 mg by mouth daily        amLODIPine 2.5 MG tablet    NORVASC    90 tablet     Take 1 tablet (2.5 mg) by mouth daily    Essential hypertension       donepezil 5 MG tablet    ARIcept     Take 5 mg by mouth daily        EFFEXOR XR PO      Take 300 mg by mouth daily (with breakfast) (Patient takes 3 x 75 mg = 225 mg dose)        escitalopram 10 MG tablet    LEXAPRO    45 tablet    Take 0.5 tablets (5 mg) by mouth daily    Major depressive disorder, recurrent episode, moderate (H)       EXCEDRIN PO      Take 1 tablet by mouth every morning (for morning headache)        hydrochlorothiazide 25 MG tablet    HYDRODIURIL    90 tablet    TAKE 1 TABLET DAILY    Essential hypertension       NONFORMULARY      Apply 1 drop to eye daily as needed (Dry eyes.) (Compounded eye drop.) ([facility])        polyethylene glycol powder    MIRALAX    510 g    Take 17 g by mouth daily as needed    Constipation       pravastatin 40 MG tablet    PRAVACHOL    90 tablet    Take 1 tablet (40 mg) by mouth At Bedtime    Hyperlipidemia LDL goal <100       PREVAGEN 10 MG Caps   Generic drug:  Apoaequorin      Take 1 capsule by mouth daily        PRINIVIL PO      Take 40 mg by mouth At Bedtime        senna-docusate 8.6-50 MG per tablet    SENOKOT-S;PERICOLACE    30 tablet    Take 1 tablet by mouth 2 times daily    Incisional hernia, without obstruction or gangrene       sulfamethoxazole-trimethoprim 800-160 MG per tablet    BACTRIM DS/SEPTRA DS    30 tablet    TAKE HALF TABLET BY MOUTH DAILY AS NEEDED AFTER INTERCOURSE TO PREVENT UTI    Recurrent UTI

## 2018-05-04 NOTE — TELEPHONE ENCOUNTER
Reason for Call:  Medication or medication refill:    Do you use a [facility]?  Name of the pharmacy and phone number for the current request:       [id number] - [place], [place] - [address]    Name of the medication requested: Escitalopram    Other request: Pt's  calling in stating he's at the [facility] and they said they never received a new Rx for pt. Would like it to be resent and to get a call when it has been.    Can we leave a detailed message on this number? YES    Phone number patient can be reached at: Cell number on file:    Telephone Information:   Mobile [phone]       Best Time: any    Call taken on 5/4/2018 at 12:48 PM by Romy Ruano

## 2018-05-05 ASSESSMENT — PATIENT HEALTH QUESTIONNAIRE - PHQ9: SUM OF ALL RESPONSES TO PHQ QUESTIONS 1-9: 14

## 2018-05-21 ENCOUNTER — TRANSFERRED RECORDS (OUTPATIENT)
Dept: HEALTH INFORMATION MANAGEMENT | Facility: CLINIC | Age: 73
End: 2018-05-21

## 2018-06-25 NOTE — PROGRESS NOTES
"MEMORY EVALUATION CLINIC - FOLLOWUP    INTERVAL HISTORY: Olivia is here with  Kevin of 53 years for annual follow up of her dementia. PCP is Dr. Villalobos. A year ago her MoCA score was 11/30. Her psychiatrist Dr. Sethi retired so she did get referred to [facility] which specializes in geriatric psychiatry and she was Rx Donepezil since Dec 2017 through their clinic. Prior to this, last summer after I met with her, Olivia had occupational therapy CPT and Olivia was really distraught and Olivia says it destroyed her self-confidence but Kevin didn't go back with her to see how she was doing on the test. She felt it was down-grading to be asked about \"what to wear if it rains and how to set up pills\". Unfortunately some of the struggles she had likely reflected the severity of her cognitive impairment. Additionally, she is a retired occupational therapist herself and Kevin says she had \"graduated with honors\". Her CPT score was 4.0/5.6 in June 2017.    Weight maybe 5# less since starting Donepezil. No n/v/d or lightheadedness. No noticeable positive changes with Donepezil though per either of them that they have noticed.    Since then Lexapro was also started in May and it really did help her which is great and is willing to stay on it. Has been on Effexor for several years and tolerating well and has been increased over the past year to 300 mg daily as well.    Kevin recently was dx with prostate cancer and needed prostatectomy and daughter Jeanette stayed with Olivia while he recovered. They have 5 children total.       TCO for sore shoulder and physical therapy started as well as an Advil for temporary use and will go off of that soon as pain is better now.     Kevin says that he realizes that she was previously dx as MCI \"and now she is more moderate\" per [facility]. He is aware of worsening memory that will continue over time and that Donepezil can be a tool to potentially help slow progression. He says she " "looks to him a lot for help. She says \"we have a good life and we laugh a lot\". Says she straightens the house and reads if by herself (which is rare that she is by herself - at most a couple hours when he is spending time with his brothers); he used to go sailing but doesn't anymore b/c he'd be gone too many hours away from her at a time. She says she likes to cook but she doesn't know what she likes to cook. Kevin clarifies that they cook together now and she doesn't do so alone.     ADLs: Independent  Driving: None  Finances:   Shopping/housekeeping/meal prep: Olivia and Kevin together  Medications: Kevin sets up and monitors her pills  Home situation: Condo with   Support:  and 5 kids  Behaviors/Hallucinations/Delusions: H/o long-term depression      REVIEW OF SYSTEMS: Detailed as above       OBJECTIVE:  /68 (BP Location: Right arm, Patient Position: Chair, Cuff Size: Adult Large)  Pulse 92  Temp 98  F (36.7  C) (Tympanic)  Ht 5' 4\" (1.626 m)  Wt 168 lb 1.6 oz (76.2 kg)  LMP 09/01/2000  BMI 28.85 kg/m2  Alert, pleasant, NAD, casually dressed, no odor  No tremor  Does look to  a lot for input  MoCA 8/30 (1, 0, 2, 1, 0, 2, 1, 1, 0, 0). Was able to make a [place] for the clock face but incorrect numbering/hands. 0/3 animal naming. Did well with numbers forward/backwards and A's. Perfect score on language (sentence repetition and amazingly able to name 15 \"F\" words of great variety). 1/2 abstraction. 0/5 delayed recall. 0/6 orientation (even though it was a hot July day today she thought it was \"cold outside and November\" as well as even though we are in [place] and she has lived in [place] for several years she came up with \"[place]\" which is closer to where she grew up).     Wt Readings from Last 4 Encounters:   06/29/18 168 lb 1.6 oz (76.2 kg)   05/04/18 167 lb (75.8 kg)   01/04/18 175 lb (79.4 kg)   10/24/17 176 lb (79.8 kg)         ASSESSMENT/PLAN: Olivia is here in follow-up of " her dementia, likely of the Alzheimer's type with comorbid long-standing depression as well as family h/o Alzheimer's disease in her mother. Mood improved with Lexapro added to Effexor. She does have some insight into her dementia and referenced her mom's dementia as I discussed hers today. Continue Donepezil as no s/e but truly is hard to see if its benefited her in any way since starting it in Dec 2017. However, there may be some benefits that are hard to see and per studies can potentially help delay NH placement by over 6 months. Her MoCA is 3 points lower this year than last year which is not unexpected. She has a lot of support from Kevin and kids can help as needed and he denies sense of burn-out. They decline extra support at this time or resources, though I worry a bit about the (admittedly rare) few hours she is alone from time to time. Try to continue to keep mind as stimulated as possible. They'd prefer not to drive out to [facility] and would like to keep all care here in the [facility] system with myself and PCP Dr. Villalobos. I'm sure she would feel comfortable Rx Effexor and Lexapro, as would I, given her positive response.     Patient Instructions   Continue current medications as is    I'm glad your mood seems to be brighter with the Lexapro (Escitalopram)    I'm fine continuing to follow long-term    Follow up in about 4-6 months        MDM: >45 minutes spent with patient/, over 50% time counseling, coordinating care and explaining about nature of the patient's conditions.    Hilary Ledezma, DO  Internal Medicine and Geriatrics  [facility]

## 2018-06-29 ENCOUNTER — OFFICE VISIT (OUTPATIENT)
Dept: FAMILY MEDICINE | Facility: CLINIC | Age: 73
End: 2018-06-29
Payer: COMMERCIAL

## 2018-06-29 VITALS
HEIGHT: 64 IN | DIASTOLIC BLOOD PRESSURE: 68 MMHG | WEIGHT: 168.1 LBS | TEMPERATURE: 98 F | SYSTOLIC BLOOD PRESSURE: 124 MMHG | HEART RATE: 92 BPM | BODY MASS INDEX: 28.7 KG/M2

## 2018-06-29 DIAGNOSIS — F03.90 DEMENTIA WITHOUT BEHAVIORAL DISTURBANCE, UNSPECIFIED DEMENTIA TYPE: Primary | ICD-10-CM

## 2018-06-29 PROCEDURE — 99215 OFFICE O/P EST HI 40 MIN: CPT | Performed by: INTERNAL MEDICINE

## 2018-06-29 RX ORDER — DONEPEZIL HYDROCHLORIDE 10 MG/1
10 TABLET, FILM COATED ORAL DAILY
Qty: 90 TABLET | Refills: 3 | Status: SHIPPED | OUTPATIENT
Start: 2018-06-29 | End: 2019-07-24

## 2018-06-29 NOTE — PATIENT INSTRUCTIONS
Continue current medications as is    I'm glad your mood seems to be brighter with the Lexapro (Escitalopram)    I'm fine continuing to follow long-term    Follow up in about 4-6 months

## 2018-06-29 NOTE — NURSING NOTE
"Chief Complaint   Patient presents with     Memory Care Follow up        Initial /68 (BP Location: Right arm, Patient Position: Chair, Cuff Size: Adult Large)  Pulse 92  Temp 98  F (36.7  C) (Tympanic)  Ht 5' 4\" (1.626 m)  Wt 168 lb 1.6 oz (76.2 kg)  LMP 09/01/2000  BMI 28.85 kg/m2 Estimated body mass index is 28.85 kg/(m^2) as calculated from the following:    Height as of this encounter: 5' 4\" (1.626 m).    Weight as of this encounter: 168 lb 1.6 oz (76.2 kg)..    BP completed using cuff size: large  MEDICATIONS REVIEWED  SOCIAL AND FAMILY HX REVIEWED  Trinidad Gutierrez CMA  "

## 2018-06-29 NOTE — LETTER
My Depression Action Plan  Name: Olivia Rodriguez   Date of Birth 1945  Date: 6/29/2018    My doctor: Ame Villalobos   My clinic: [place] [address] [facility] [address] [phone]          GREEN    ZONE   Good Control    What it looks like:     Things are going generally well. You have normal up s and down s. You may even feel depressed from time to time, but bad moods usually last less than a day.   What you need to do:  1. Continue to care for yourself (see self care plan)  2. Check your depression survival kit and update it as needed  3. Follow your physician s recommendations including any medication.  4. Do not stop taking medication unless you consult with your physician first.           YELLOW         ZONE Getting Worse    What it looks like:     Depression is starting to interfere with your life.     It may be hard to get out of bed; you may be starting to isolate yourself from others.    Symptoms of depression are starting to last most all day and this has happened for several days.     You may have suicidal thoughts but they are not constant.   What you need to do:     1. Call your care team, your response to treatment will improve if you keep your care team informed of your progress. Yellow periods are signs an adjustment may need to be made.     2. Continue your self-care, even if you have to fake it!    3. Talk to someone in your support network    4. Open up your depression survival kit           RED    ZONE Medical Alert - Get Help    What it looks like:     Depression is seriously interfering with your life.     You may experience these or other symptoms: You can t get out of bed most days, can t work or engage in other necessary activities, you have trouble taking care of basic hygiene, or basic responsibilities, thoughts of suicide or death that will not go away, self-injurious behavior.     What you need to do:  1. Call your care team and request a  same-day appointment. If they are not available (weekends or after hours) call your local crisis line, emergency room or 911.            Depression Self Care Plan / Survival Kit    Self-Care for Depression  Here s the deal. Your body and mind are really not as separate as most people think.  What you do and think affects how you feel and how you feel influences what you do and think. This means if you do things that people who feel good do, it will help you feel better.  Sometimes this is all it takes.  There is also a place for medication and therapy depending on how severe your depression is, so be sure to consult with your medical provider and/ or Behavioral Health Consultant if your symptoms are worsening or not improving.     In order to better manage my stress, I will:    Exercise  Get some form of exercise, every day. This will help reduce pain and release endorphins, the  feel good  chemicals in your brain. This is almost as good as taking antidepressants!  This is not the same as joining a gym and then never going! (they count on that by the way ) It can be as simple as just going for a walk or doing some gardening, anything that will get you moving.      Hygiene   Maintain good hygiene (Get out of bed in the morning, Make your bed, Brush your teeth, Take a shower, and Get dressed like you were going to work, even if you are unemployed).  If your clothes don't fit try to get ones that do.    Diet  I will strive to eat foods that are good for me, drink plenty of water, and avoid excessive sugar, caffeine, alcohol, and other mood-altering substances.  Some foods that are helpful in depression are: complex carbohydrates, B vitamins, flaxseed, fish or fish oil, fresh fruits and vegetables.    Psychotherapy  I agree to participate in Individual Therapy (if recommended).    Medication  If prescribed medications, I agree to take them.  Missing doses can result in serious side effects.  I understand that drinking  alcohol, or other illicit drug use, may cause potential side effects.  I will not stop my medication abruptly without first discussing it with my provider.    Staying Connected With Others  I will stay in touch with my friends, family members, and my primary care provider/team.    Use your imagination  Be creative.  We all have a creative side; it doesn t matter if it s oil painting, sand castles, or mud pies! This will also kick up the endorphins.    Witness Beauty  (AKA stop and smell the roses) Take a look outside, even in mid-winter. Notice colors, textures. Watch the squirrels and birds.     Service to others  Be of service to others.  There is always someone else in need.  By helping others we can  get out of ourselves  and remember the really important things.  This also provides opportunities for practicing all the other parts of the program.    Humor  Laugh and be silly!  Adjust your TV habits for less news and crime-drama and more comedy.    Control your stress  Try breathing deep, massage therapy, biofeedback, and meditation. Find time to relax each day.     My support system    Clinic Contact:  Phone number:    Contact 1:  Phone number:    Contact 2:  Phone number:    Protestant/:  Phone number:    Therapist:  Phone number:    Local crisis center:    Phone number:    Other community support:  Phone number:

## 2018-06-29 NOTE — Clinical Note
FYI, they may want you or I to take over her Effexor as they don't want to drive out to [facility] anymore. I told them this should be fine as it is working well for her along with Lexapro. Her dementia is quite pronounced now other than seems to have intact language skills. Thanks!

## 2018-06-29 NOTE — MR AVS SNAPSHOT
After Visit Summary   6/29/2018    Olivia Rodriguez    MRN: [id number]           Patient Information     Date Of Birth          1945        Visit Information        Provider Department      6/29/2018 1:00 PM Hilary Ledezma DO [facility]        Today's Diagnoses     Dementia without behavioral disturbance, unspecified dementia type    -  1      Care Instructions    Continue current medications as is    I'm glad your mood seems to be brighter with the Lexapro (Escitalopram)    I'm fine continuing to follow long-term    Follow up in about 4-6 months            Follow-ups after your visit        Who to contact     If you have questions or need follow up information about today's clinic visit or your schedule please contact [facility] directly at [phone].  Normal or non-critical lab and imaging results will be communicated to you by MyChart, letter or phone within 4 business days after the clinic has received the results. If you do not hear from us within 7 days, please contact the clinic through MyChart or phone. If you have a critical or abnormal lab result, we will notify you by phone as soon as possible.  Submit refill requests through [employer] or call your pharmacy and they will forward the refill request to us. Please allow 3 business days for your refill to be completed.          Additional Information About Your Visit        MyChart Information     [employer] gives you secure access to your electronic health record. If you see a primary care provider, you can also send messages to your care team and make appointments. If you have questions, please call your primary care clinic.  If you do not have a primary care provider, please call [phone] and they will assist you.        Care EveryWhere ID     This is your Care EveryWhere ID. This could be used by other organizations to access your [facility] medical records  [id number]        Your Vitals Were     Pulse  "Temperature Height Last Period BMI (Body Mass Index)       92 98  F (36.7  C) (Tympanic) 5' 4\" (1.626 m) 09/01/2000 28.85 kg/m2        Blood Pressure from Last 3 Encounters:   06/29/18 124/68   05/04/18 (!) 156/93   01/04/18 (!) 158/93    Weight from Last 3 Encounters:   06/29/18 168 lb 1.6 oz (76.2 kg)   05/04/18 167 lb (75.8 kg)   01/04/18 175 lb (79.4 kg)              We Performed the Following     DEPRESSION ACTION PLAN (DAP)          Today's Medication Changes          These changes are accurate as of 6/29/18  2:10 PM.  If you have any questions, ask your nurse or doctor.               These medicines have changed or have updated prescriptions.        Dose/Directions    donepezil 10 MG tablet   Commonly known as:  ARICEPT   This may have changed:    - medication strength  - Another medication with the same name was removed. Continue taking this medication, and follow the directions you see here.   Used for:  Dementia without behavioral disturbance, unspecified dementia type   Changed by:  Hilary Ledezma DO        Dose:  10 mg   Take 1 tablet (10 mg) by mouth daily   Quantity:  90 tablet   Refills:  3       senna-docusate 8.6-50 MG per tablet   Commonly known as:  SENOKOT-S;PERICOLACE   This may have changed:    - when to take this  - reasons to take this   Used for:  Incisional hernia, without obstruction or gangrene        Dose:  1 tablet   Take 1 tablet by mouth 2 times daily   Quantity:  30 tablet   Refills:  0            Where to get your medicines      These medications were sent to [id number] - [place], [place] - [address]  [address] [place] [address]     Phone:  [phone]     donepezil 10 MG tablet                Primary Care Provider Office Phone # Fax #    Ame Villalobos [phone] [address]  [place] [address]        Equal Access to Services     JOCY POSADA AH: Hadii aad sonia Sykes, adrian gamez, meg jauregui " quinton lopezizabella mancillaaan ah. Nahomi [facility] [phone].    ATENCIÓN: Si ceela srinath, tiene a perla disposición servicios gratuitos de asistencia lingüística. Kalyn al [phone].    We comply with applicable federal civil rights laws and [place] laws. We do not discriminate on the basis of race, color, national origin, age, disability, sex, sexual orientation, or gender identity.            Thank you!     Thank you for choosing [facility]  for your care. Our goal is always to provide you with excellent care. Hearing back from our patients is one way we can continue to improve our services. Please take a few minutes to complete the written survey that you may receive in the mail after your visit with us. Thank you!             Your Updated Medication List - Protect others around you: Learn how to safely use, store and throw away your medicines at www.disposemymeds.org.          This list is accurate as of 6/29/18  2:10 PM.  Always use your most recent med list.                   Brand Name Dispense Instructions for use Diagnosis    ALLEGRA PO      Take 1 tablet by mouth daily        amLODIPine 2.5 MG tablet    NORVASC    90 tablet    Take 1 tablet (2.5 mg) by mouth daily    Essential hypertension       donepezil 10 MG tablet    ARICEPT    90 tablet    Take 1 tablet (10 mg) by mouth daily    Dementia without behavioral disturbance, unspecified dementia type       EFFEXOR XR PO      Take 300 mg by mouth daily (with breakfast) (Patient takes 2 x 150 mg)        escitalopram 10 MG tablet    LEXAPRO    45 tablet    Take 0.5 tablets (5 mg) by mouth daily    Major depressive disorder, recurrent episode, moderate (H)       EXCEDRIN PO      Take 1 tablet by mouth every morning (for morning headache)        hydrochlorothiazide 25 MG tablet    HYDRODIURIL    90 tablet    TAKE 1 TABLET DAILY    Essential hypertension       NONFORMULARY      Apply 1 drop to eye daily as needed (Dry eyes.)  (Compounded eye drop.) ([facility])        polyethylene glycol powder    MIRALAX    510 g    Take 17 g by mouth daily as needed    Constipation       pravastatin 40 MG tablet    PRAVACHOL    90 tablet    Take 1 tablet (40 mg) by mouth At Bedtime    Hyperlipidemia LDL goal <100       PRINIVIL PO      Take 40 mg by mouth At Bedtime        senna-docusate 8.6-50 MG per tablet    SENOKOT-S;PERICOLACE    30 tablet    Take 1 tablet by mouth 2 times daily    Incisional hernia, without obstruction or gangrene       sulfamethoxazole-trimethoprim 800-160 MG per tablet    BACTRIM DS/SEPTRA DS    30 tablet    TAKE HALF TABLET BY MOUTH DAILY AS NEEDED AFTER INTERCOURSE TO PREVENT UTI    Recurrent UTI

## 2018-07-07 PROBLEM — F03.90 DEMENTIA WITHOUT BEHAVIORAL DISTURBANCE, UNSPECIFIED DEMENTIA TYPE: Status: ACTIVE | Noted: 2018-07-07

## 2018-07-07 PROBLEM — F03.90 DEMENTIA WITHOUT BEHAVIORAL DISTURBANCE, UNSPECIFIED DEMENTIA TYPE: Chronic | Status: ACTIVE | Noted: 2018-07-07

## 2018-07-07 PROBLEM — R41.89 COGNITIVE IMPAIRMENT: Status: RESOLVED | Noted: 2017-07-09 | Resolved: 2018-07-07

## 2018-07-18 ENCOUNTER — MYC MEDICAL ADVICE (OUTPATIENT)
Dept: FAMILY MEDICINE | Facility: CLINIC | Age: 73
End: 2018-07-18

## 2018-09-28 ENCOUNTER — OFFICE VISIT (OUTPATIENT)
Dept: FAMILY MEDICINE | Facility: CLINIC | Age: 73
End: 2018-09-28
Payer: COMMERCIAL

## 2018-09-28 VITALS
DIASTOLIC BLOOD PRESSURE: 70 MMHG | WEIGHT: 166.6 LBS | SYSTOLIC BLOOD PRESSURE: 130 MMHG | HEIGHT: 64 IN | TEMPERATURE: 98 F | BODY MASS INDEX: 28.44 KG/M2 | HEART RATE: 84 BPM | OXYGEN SATURATION: 97 %

## 2018-09-28 DIAGNOSIS — Z79.899 MEDICATION MANAGEMENT: ICD-10-CM

## 2018-09-28 DIAGNOSIS — Z13.0 SCREENING FOR DEFICIENCY ANEMIA: ICD-10-CM

## 2018-09-28 DIAGNOSIS — Z00.00 ROUTINE HISTORY AND PHYSICAL EXAMINATION OF ADULT: Primary | ICD-10-CM

## 2018-09-28 DIAGNOSIS — Z85.3 PERSONAL HISTORY OF MALIGNANT NEOPLASM OF BREAST: ICD-10-CM

## 2018-09-28 DIAGNOSIS — H61.22 IMPACTED CERUMEN OF LEFT EAR: ICD-10-CM

## 2018-09-28 DIAGNOSIS — F33.1 MAJOR DEPRESSIVE DISORDER, RECURRENT EPISODE, MODERATE (H): ICD-10-CM

## 2018-09-28 DIAGNOSIS — F03.90 DEMENTIA WITHOUT BEHAVIORAL DISTURBANCE, UNSPECIFIED DEMENTIA TYPE: Chronic | ICD-10-CM

## 2018-09-28 DIAGNOSIS — I10 ESSENTIAL HYPERTENSION: ICD-10-CM

## 2018-09-28 DIAGNOSIS — Z23 NEED FOR PROPHYLACTIC VACCINATION AND INOCULATION AGAINST INFLUENZA: ICD-10-CM

## 2018-09-28 DIAGNOSIS — E78.2 MIXED HYPERLIPIDEMIA: ICD-10-CM

## 2018-09-28 DIAGNOSIS — Z87.440 H/O RECURRENT URINARY TRACT INFECTION: ICD-10-CM

## 2018-09-28 DIAGNOSIS — E78.5 HYPERLIPIDEMIA LDL GOAL <100: ICD-10-CM

## 2018-09-28 DIAGNOSIS — Z13.29 SCREENING FOR THYROID DISORDER: ICD-10-CM

## 2018-09-28 PROCEDURE — 69209 REMOVE IMPACTED EAR WAX UNI: CPT | Mod: LT | Performed by: INTERNAL MEDICINE

## 2018-09-28 PROCEDURE — 90662 IIV NO PRSV INCREASED AG IM: CPT | Performed by: INTERNAL MEDICINE

## 2018-09-28 PROCEDURE — G0439 PPPS, SUBSEQ VISIT: HCPCS | Performed by: INTERNAL MEDICINE

## 2018-09-28 PROCEDURE — 99213 OFFICE O/P EST LOW 20 MIN: CPT | Mod: 25 | Performed by: INTERNAL MEDICINE

## 2018-09-28 PROCEDURE — G0008 ADMIN INFLUENZA VIRUS VAC: HCPCS | Performed by: INTERNAL MEDICINE

## 2018-09-28 RX ORDER — HYDROCHLOROTHIAZIDE 25 MG/1
25 TABLET ORAL DAILY
Qty: 90 TABLET | Refills: 3 | Status: SHIPPED | OUTPATIENT
Start: 2018-09-28 | End: 2019-08-01

## 2018-09-28 RX ORDER — PRAVASTATIN SODIUM 40 MG
40 TABLET ORAL AT BEDTIME
Qty: 90 TABLET | Refills: 3 | Status: SHIPPED | OUTPATIENT
Start: 2018-09-28 | End: 2019-08-01

## 2018-09-28 RX ORDER — ESCITALOPRAM OXALATE 10 MG/1
5 TABLET ORAL DAILY
Qty: 45 TABLET | Refills: 1 | Status: SHIPPED | OUTPATIENT
Start: 2018-09-28 | End: 2019-03-26

## 2018-09-28 NOTE — PATIENT INSTRUCTIONS
Preventive Health Recommendations    Female Ages 65 +    Yearly exam:     See your health care provider every year in order to  o Review health changes.   o Discuss preventive care.    o Review your medicines if your doctor has prescribed any.      You no longer need a yearly Pap test unless you've had an abnormal Pap test in the past 10 years. If you have vaginal symptoms, such as bleeding or discharge, be sure to talk with your provider about a Pap test.      Every 1 to 2 years, have a mammogram.  If you are over 69, talk with your health care provider about whether or not you want to continue having screening mammograms.      Every 10 years, have a colonoscopy. Or, have a yearly FIT test (stool test). These exams will check for colon cancer.       Have a cholesterol test every 5 years, or more often if your doctor advises it.       Have a diabetes test (fasting glucose) every three years. If you are at risk for diabetes, you should have this test more often.       At age 65, have a bone density scan (DEXA) to check for osteoporosis (brittle bone disease).    Shots:    Get a flu shot each year.    Get a tetanus shot every 10 years.    Talk to your doctor about your pneumonia vaccines. There are now two you should receive - Pneumovax (PPSV 23) and Prevnar (PCV 13).    Talk to your pharmacist about the shingles vaccine.    Talk to your doctor about the hepatitis B vaccine.    Nutrition:     Eat at least 5 servings of fruits and vegetables each day.      Eat whole-grain bread, whole-wheat pasta and brown rice instead of white grains and rice.      Get adequate Calcium and Vitamin D.     Lifestyle    Exercise at least 150 minutes a week (30 minutes a day, 5 days a week). This will help you control your weight and prevent disease.      Limit alcohol to one drink per day.      No smoking.       Wear sunscreen to prevent skin cancer.       See your dentist twice a year for an exam and cleaning.      See your eye doctor  every 1 to 2 years to screen for conditions such as glaucoma, macular degeneration and cataracts.    I refilled your prescriptions  Check with our pharmacy located in [address] about your insurance company coverage for new Shingles vaccine, which is now available  It's called SHINGRIX and is a series of two shots, 2-6 months apart  It is considered more than 90% effective  Follow up in 6 months  Seek sooner medical attention if there is any worsening of symptoms or problems

## 2018-09-28 NOTE — MR AVS SNAPSHOT
After Visit Summary   9/28/2018    Olivia Rodriguez    MRN: [id number]           Patient Information     Date Of Birth          1945        Visit Information        Provider Department      9/28/2018 10:30 AM Ame Villalobos MD [facility]        Today's Diagnoses     Routine history and physical examination of adult    -  1    Personal history of malignant neoplasm of breast        Dementia without behavioral disturbance, unspecified dementia type        Major depressive disorder, recurrent episode, moderate (H)        Mixed hyperlipidemia        Screening for thyroid disorder        Medication management        Screening for deficiency anemia        H/O recurrent urinary tract infection        Hyperlipidemia LDL goal <100        Essential hypertension        Impacted cerumen of left ear          Care Instructions      Preventive Health Recommendations    Female Ages 65 +    Yearly exam:     See your health care provider every year in order to  o Review health changes.   o Discuss preventive care.    o Review your medicines if your doctor has prescribed any.      You no longer need a yearly Pap test unless you've had an abnormal Pap test in the past 10 years. If you have vaginal symptoms, such as bleeding or discharge, be sure to talk with your provider about a Pap test.      Every 1 to 2 years, have a mammogram.  If you are over 69, talk with your health care provider about whether or not you want to continue having screening mammograms.      Every 10 years, have a colonoscopy. Or, have a yearly FIT test (stool test). These exams will check for colon cancer.       Have a cholesterol test every 5 years, or more often if your doctor advises it.       Have a diabetes test (fasting glucose) every three years. If you are at risk for diabetes, you should have this test more often.       At age 65, have a bone density scan (DEXA) to check for osteoporosis (brittle bone  disease).    Shots:    Get a flu shot each year.    Get a tetanus shot every 10 years.    Talk to your doctor about your pneumonia vaccines. There are now two you should receive - Pneumovax (PPSV 23) and Prevnar (PCV 13).    Talk to your pharmacist about the shingles vaccine.    Talk to your doctor about the hepatitis B vaccine.    Nutrition:     Eat at least 5 servings of fruits and vegetables each day.      Eat whole-grain bread, whole-wheat pasta and brown rice instead of white grains and rice.      Get adequate Calcium and Vitamin D.     Lifestyle    Exercise at least 150 minutes a week (30 minutes a day, 5 days a week). This will help you control your weight and prevent disease.      Limit alcohol to one drink per day.      No smoking.       Wear sunscreen to prevent skin cancer.       See your dentist twice a year for an exam and cleaning.      See your eye doctor every 1 to 2 years to screen for conditions such as glaucoma, macular degeneration and cataracts.    I refilled your prescriptions  Check with our pharmacy located in [address] about your insurance company coverage for new Shingles vaccine, which is now available  It's called SHINGRIX and is a series of two shots, 2-6 months apart  It is considered more than 90% effective  Follow up in  Seek sooner medical attention if there is any worsening of symptoms or problems            Follow-ups after your visit        Follow-up notes from your care team     Return in about 6 months (around 3/28/2019) for medication follow up, hypertension.      Your next 10 appointments already scheduled     Oct 24, 2018  1:30 PM CDT   [facility] Lede[facility]ma,    [facility]    [address] [facility] [address] [phone]              Who to contact     If you have questions or need follow up information about today's clinic visit or your schedule please contact [facility] directly at [phone].  Normal or  "non-critical lab and imaging results will be communicated to you by MyChart, letter or phone within 4 business days after the clinic has received the results. If you do not hear from us within 7 days, please contact the clinic through [employer] or phone. If you have a critical or abnormal lab result, we will notify you by phone as soon as possible.  Submit refill requests through [employer] or call your pharmacy and they will forward the refill request to us. Please allow 3 business days for your refill to be completed.          Additional Information About Your Visit        [employer] Information     [employer] gives you secure access to your electronic health record. If you see a primary care provider, you can also send messages to your care team and make appointments. If you have questions, please call your primary care clinic.  If you do not have a primary care provider, please call [phone] and they will assist you.        Care EveryWhere ID     This is your Care EveryWhere ID. This could be used by other organizations to access your [facility] medical records  [id number]        Your Vitals Were     Pulse Temperature Height Last Period Pulse Oximetry Breastfeeding?    84 98  F (36.7  C) (Oral) 5' 4\" (1.626 m) 09/01/2000 97% No    BMI (Body Mass Index)                   28.6 kg/m2            Blood Pressure from Last 3 Encounters:   09/28/18 130/70   06/29/18 124/68   05/04/18 (!) 156/93    Weight from Last 3 Encounters:   09/28/18 166 lb 9.6 oz (75.6 kg)   06/29/18 168 lb 1.6 oz (76.2 kg)   05/04/18 167 lb (75.8 kg)              We Performed the Following     CBC with platelets     Comprehensive metabolic panel     Lipid panel reflex to direct LDL Fasting     TSH with free T4 reflex     UA with Microscopic reflex to Culture          Today's Medication Changes          These changes are accurate as of 9/28/18 11:22 AM.  If you have any questions, ask your nurse or doctor.               These medicines have changed or " have updated prescriptions.        Dose/Directions    hydrochlorothiazide 25 MG tablet   Commonly known as:  HYDRODIURIL   This may have changed:  See the new instructions.   Used for:  Essential hypertension   Changed by:  Ame Villalobos MD        Dose:  25 mg   Take 1 tablet (25 mg) by mouth daily   Quantity:  90 tablet   Refills:  3       senna-docusate 8.6-50 MG per tablet   Commonly known as:  SENOKOT-S;PERICOLACE   This may have changed:    - when to take this  - reasons to take this   Used for:  Incisional hernia, without obstruction or gangrene        Dose:  1 tablet   Take 1 tablet by mouth 2 times daily   Quantity:  30 tablet   Refills:  0            Where to get your medicines      These medications were sent to [id number] - [place] - [address]  [address] [place] [address]     Phone:  [phone]     escitalopram 10 MG tablet    hydrochlorothiazide 25 MG tablet    pravastatin 40 MG tablet                Primary Care Provider Office Phone # Fax #    Ame Villalobos [phone] [address]  [place] [address]        Equal Access to Services     [facility] Hospital[facility] AH: Hadii aad ku hadasho Onel, waaxda luqadaha, qaybta kaalmada jessica, quinton davila . So [facility] [phone].    ATENCIÓN: Si habla español, tiene a perla disposición servicios gratuitos de asistencia lingüística. [facility] [phone].    We comply with applicable federal civil rights laws and [place] laws. We do not discriminate on the basis of race, color, national origin, age, disability, sex, sexual orientation, or gender identity.            Thank you!     Thank you for choosing [facility]  for your care. Our goal is always to provide you with excellent care. Hearing back from our patients is one way we can continue to improve our services. Please take a few minutes to complete the written survey that you may receive in the mail after  your visit with us. Thank you!             Your Updated Medication List - Protect others around you: Learn how to safely use, store and throw away your medicines at www.disposemymeds.org.          This list is accurate as of 9/28/18 11:22 AM.  Always use your most recent med list.                   Brand Name Dispense Instructions for use Diagnosis    ALLEGRA PO      Take 1 tablet by mouth daily        amLODIPine 2.5 MG tablet    NORVASC    90 tablet    Take 1 tablet (2.5 mg) by mouth daily    Essential hypertension       donepezil 10 MG tablet    ARICEPT    90 tablet    Take 1 tablet (10 mg) by mouth daily    Dementia without behavioral disturbance, unspecified dementia type       EFFEXOR XR PO      Take 300 mg by mouth daily (with breakfast) (Patient takes 2 x 150 mg)        escitalopram 10 MG tablet    LEXAPRO    45 tablet    Take 0.5 tablets (5 mg) by mouth daily    Major depressive disorder, recurrent episode, moderate (H)       EXCEDRIN PO      Take 1 tablet by mouth every morning (for morning headache)        hydrochlorothiazide 25 MG tablet    HYDRODIURIL    90 tablet    Take 1 tablet (25 mg) by mouth daily    Essential hypertension       lisinopril 40 MG tablet    PRINIVIL/ZESTRIL    90 tablet    TAKE 1 TABLET DAILY    Essential hypertension       NONFORMULARY      Apply 1 drop to eye daily as needed (Dry eyes.) (Compounded eye drop.) ([facility])        polyethylene glycol powder    MIRALAX    510 g    Take 17 g by mouth daily as needed    Constipation       pravastatin 40 MG tablet    PRAVACHOL    90 tablet    Take 1 tablet (40 mg) by mouth At Bedtime    Hyperlipidemia LDL goal <100       senna-docusate 8.6-50 MG per tablet    SENOKOT-S;PERICOLACE    30 tablet    Take 1 tablet by mouth 2 times daily    Incisional hernia, without obstruction or gangrene       sulfamethoxazole-trimethoprim 800-160 MG per tablet    BACTRIM DS/SEPTRA DS    30 tablet    TAKE HALF TABLET BY MOUTH DAILY AS NEEDED AFTER  INTERCOURSE TO PREVENT UTI    Recurrent UTI

## 2018-09-28 NOTE — PROGRESS NOTES
SUBJECTIVE:   Olivia Rodriguez is a 73 year old female who presents for Preventive Visit.    Patient is accompanied by her , who left before exam    Are you in the first 12 months of your Medicare Part B coverage?  No    Healthy Habits:    Do you get at least three servings of calcium containing foods daily (dairy, green leafy vegetables, etc.)? 1-2 times per day.    Amount of exercise or daily activities, outside of work: 3 day(s) per week for half hour - walking.    Problems taking medications regularly No    Medication side effects: No    Have you had an eye exam in the past two years? yes    Do you see a dentist twice per year? Once a year.    Do you have sleep apnea, excessive snoring or daytime drowsiness?no      Ability to successfully perform activities of daily living: No, needs assistance with: transportation, medications and managing money    Home safety:  none identified     Hearing impairment: Yes, Need to ask people to speak up or repeat themselves.    Fall risk:  Fallen 2 or more times in the past year?: No  Any fall with injury in the past year?: No    COGNITIVE SCREENING:  Not appropriate due to known dementia    Reviewed and updated as needed this visit by clinical staff  Tobacco  Allergies  Meds  Soc Hx        Reviewed and updated as needed this visit by Provider        Social History   Substance Use Topics     Smoking status: Never Smoker     Smokeless tobacco: Never Used     Alcohol use 0.6 oz/week     1 Standard drinks or equivalent per week      Comment: 2-3 glasses- rarely      If you drink alcohol do you typically have >3 drinks per day or >7 drinks per week? No                        Today's PHQ-2 Score:   PHQ-2 ( 1999 [employer]) 9/28/2018 1/4/2018   Q1: Little interest or pleasure in doing things 0 0   Q2: Feeling down, depressed or hopeless 0 1   PHQ-2 Score 0 1     Do you feel safe in your environment - YES    Do you have a Health Care Directive?: Yes: Advance Directive has  been received and scanned.    Current providers sharing in care for this patient include:   Patient Care Team:  Ame Villalobos MD as PCP - General (Internal Medicine)    The following health maintenance items are reviewed in Epic and correct as of today:  Health Maintenance   Topic Date Due     WELLNESS VISIT Q1 YR  10/13/2016     INFLUENZA VACCINE (1) 2018     PHQ-9 Q6 MONTHS  2018     FALL RISK ASSESSMENT  2019     DEPRESSION ACTION PLAN Q1 YR  2019     LIPID SCREEN Q5 YR FEMALE (SYSTEM ASSIGNED)  2021     COLON CANCER SCREEN (SYSTEM ASSIGNED)  11/15/2022     ADVANCE DIRECTIVE PLANNING Q5 YRS  2022     TETANUS IMMUNIZATION (SYSTEM ASSIGNED)  10/13/2025     DEXA SCAN SCREENING (SYSTEM ASSIGNED)  Completed     PNEUMOCOCCAL  Completed     Labs reviewed in EPIC  Patient Active Problem List   Diagnosis     Major depressive disorder, recurrent episode, moderate (H)     TMJ (temporomandibular joint syndrome)     S/P total knee arthroplasty     S/P HEATHER-BSO (total abdominal hysterectomy and bilateral salpingo-oophorectomy)     Sensation of feeling cold     Health Care Home     Anxiety     Controlled substance agreement signed     Essential hypertension     Coronary artery calcification     Lung nodule     Trigeminal neuralgia     Status post total bilateral knee replacement     Incisional hernia     Advance Care Planning     Dementia without behavioral disturbance, unspecified dementia type     Personal history of malignant neoplasm of breast     Mixed hyperlipidemia     Past Surgical History:   Procedure Laterality Date     BIOPSY  13    right breast biopsy     BIOPSY  13    right axillary lymph node     BREAST SURGERY  13    right breast partial mastectomy/lumpectomy      SECTION       COLONOSCOPY      She had colonoscopy and endoscopy done on November 15, 2012 for workup of iron deficiency anemia and the results were satisfactory     GYN SURGERY            HEAD & NECK SURGERY      surgery for Trigeminal neuralgia       HYSTERECTOMY TOTAL ABDOMINAL, BILATERAL SALPINGO-OOPHORECTOMY, COMBINED  2006     LAPAROSCOPIC HERNIORRHAPHY INCISIONAL N/A 4/10/2017    Procedure: LAPAROSCOPIC HERNIORRHAPHY INCISIONAL;  Surgeon: Dayanara Kendall MD;  Location: [facility]     MASTECTOMY PARTIAL WITH SENTINEL NODE  8/20/2013    Procedure: MASTECTOMY PARTIAL WITH SENTINEL NODE;  RIGHT PARTIAL MASTECTOMY WITH RIGHT AXILLARY SENTINEL NODE BIOPSY;  Surgeon: Kae Padilla MD;  Location: [facility]     ORTHOPEDIC SURGERY      Right TKA and Left TKA       Social History   Substance Use Topics     Smoking status: Never Smoker     Smokeless tobacco: Never Used     Alcohol use 0.6 oz/week     1 Standard drinks or equivalent per week      Comment: 2-3 glasses- rarely      Family History   Problem Relation Age of Onset     Cardiovascular Father      Alzheimer Disease Mother      Cancer - colorectal No family hx of      Breast Cancer No family hx of          Current Outpatient Prescriptions   Medication Sig Dispense Refill     amLODIPine (NORVASC) 2.5 MG tablet Take 1 tablet (2.5 mg) by mouth daily 90 tablet 3     Aspirin-Acetaminophen-Caffeine (EXCEDRIN PO) Take 1 tablet by mouth every morning (for morning headache)       donepezil (ARICEPT) 10 MG tablet Take 1 tablet (10 mg) by mouth daily 90 tablet 3     escitalopram (LEXAPRO) 10 MG tablet Take 0.5 tablets (5 mg) by mouth daily 45 tablet 1     Fexofenadine HCl (ALLEGRA PO) Take 1 tablet by mouth daily       hydrochlorothiazide (HYDRODIURIL) 25 MG tablet Take 1 tablet (25 mg) by mouth daily 90 tablet 3     lisinopril (PRINIVIL/ZESTRIL) 40 MG tablet TAKE 1 TABLET DAILY 90 tablet 2     NONFORMULARY Apply 1 drop to eye daily as needed (Dry eyes.) (Compounded eye drop.) ([facility])       polyethylene glycol (MIRALAX) powder Take 17 g by mouth daily as needed 510 g 1     pravastatin (PRAVACHOL) 40 MG tablet Take 1 tablet (40 mg) by mouth At  "Bedtime 90 tablet 3     senna-docusate (SENOKOT-S;PERICOLACE) 8.6-50 MG per tablet Take 1 tablet by mouth 2 times daily (Patient taking differently: Take 1 tablet by mouth as needed ) 30 tablet 0     sulfamethoxazole-trimethoprim (BACTRIM DS/SEPTRA DS) 800-160 MG per tablet TAKE HALF TABLET BY MOUTH DAILY AS NEEDED AFTER INTERCOURSE TO PREVENT UTI 30 tablet 1     Venlafaxine HCl (EFFEXOR XR PO) Take 300 mg by mouth daily (with breakfast) (Patient takes 2 x 150 mg)       [DISCONTINUED] escitalopram (LEXAPRO) 10 MG tablet Take 0.5 tablets (5 mg) by mouth daily 45 tablet 1     [DISCONTINUED] hydrochlorothiazide (HYDRODIURIL) 25 MG tablet TAKE 1 TABLET DAILY 90 tablet 3     [DISCONTINUED] Lisinopril (PRINIVIL PO) Take 40 mg by mouth At Bedtime       [DISCONTINUED] pravastatin (PRAVACHOL) 40 MG tablet Take 1 tablet (40 mg) by mouth At Bedtime 90 tablet 3     Allergies   Allergen Reactions     Seafood Anaphylaxis     Femara [Letrozole] Rash     Latex Rash     WITH LATEX BANDAIDS     Tamoxifen Rash     ROS:  Constitutional, HEENT, cardiovascular, pulmonary, GI, , musculoskeletal, neuro, skin, endocrine and psych systems are negative, except as otherwise noted.    POSITIVE for dementia    This document serves as a record of the services and decisions personally performed and made by Aem Villalobos MD. It was created on her behalf by Betty Diaz, a trained medical scribe. The creation of this document is based on the provider's statements to the medical scribe.  Betty Diaz 10:58 AM September 28, 2018  OBJECTIVE:   /70  Pulse 84  Temp 98  F (36.7  C) (Oral)  Ht 1.626 m (5' 4\")  Wt 75.6 kg (166 lb 9.6 oz)  LMP 09/01/2000  SpO2 97%  Breastfeeding? No  BMI 28.6 kg/m2 Estimated body mass index is 28.6 kg/(m^2) as calculated from the following:    Height as of this encounter: 1.626 m (5' 4\").    Weight as of this encounter: 75.6 kg (166 lb 9.6 oz).     EXAM:   GENERAL APPEARANCE: healthy, alert and no distress  EYES: Eyes " grossly normal to inspection, PERRL and conjunctivae and sclerae normal  HENT: cerumen impaction of left ear, TM's normal, nose and mouth without ulcers or lesions, oropharynx clear and oral mucous membranes moist  NECK: no adenopathy, no asymmetry, masses, or scars and thyroid normal to palpation  RESP: lungs clear to auscultation - no rales, rhonchi or wheezes  BREAST: normal without masses, tenderness or nipple discharge and no palpable axillary masses or adenopathy  CV: regular rate and rhythm, normal S1 S2, no S3 or S4, no murmur, click or rub, she has peripheral edema and peripheral pulses strong  ABDOMEN: soft, nontender, no hepatosplenomegaly, no masses and bowel sounds normal  MS: bilateral edema of lower extremities, no musculoskeletal defects are noted and gait is age appropriate without ataxia  SKIN: scar on right breast, no suspicious lesions or rashes  NEURO: Normal strength and tone, sensory exam grossly normal, mentation intact and speech normal  PSYCH: mentation appears normal and affect normal/bright    Diagnostic Test Results:  No results found for this or any previous visit (from the past 24 hour(s)).    ASSESSMENT / PLAN:   Olivia was seen today for wellness visit.    Diagnoses and all orders for this visit:    Routine history and physical examination of adult  Patient is here for a wellness visit  Information about health preventative measures given in office today    Personal history of malignant neoplasm of breast  She has a history of breast cancer in her right breast  She is followed by [place] [facility]  Advised her to follow up with Dr. Howard again    Dementia without behavioral disturbance, unspecified dementia type  Patient reports she is not managing her memory well  Endorses she doesn't have a great memory  She has confused her  as her father before  Reports she wasn't sure if her  was her   Patient is able to cook and take care of house chores by  herself  Reports she does not drive anymore  States her and her  do everything together  Patient has been to [place] and enjoyed time there  Have decided if time is right they will go into assisted living and if needed memory care unit  She takes 1 tablet of Aricept daily  She is doing physical and brain exercises to delay alzheimer's  She follows Dr. Ledezma and has upcoming appointment.    Major depressive disorder, recurrent episode, moderate (H)  -     escitalopram (LEXAPRO) 10 MG tablet; Take 0.5 tablets (5 mg) by mouth daily  Stable  Compliant with medications  She takes Effexor daily with food     Mixed hyperlipidemia  -     Lipid panel reflex to direct LDL Fasting    Hyperlipidemia LDL goal <100  -     pravastatin (PRAVACHOL) 40 MG tablet; Take 1 tablet (40 mg) by mouth At Bedtime  Stable  Compliant with medication    Screening for thyroid disorder  -     TSH with free T4 reflex    Medication management  -     Comprehensive metabolic panel    Screening for deficiency anemia  -     CBC with platelets    H/O recurrent urinary tract infection  -     UA with Microscopic reflex to Culture    Essential hypertension  -     hydrochlorothiazide (HYDRODIURIL) 25 MG tablet; Take 1 tablet (25 mg) by mouth daily  Her BP was elevated today  Recked BP in office today  BP was 130/70 during office visit    Impacted cerumen of left ear  Cerumen impaction on left side  Ear wash done in office today by MA    Need for prophylactic vaccination and inoculation against influenza  -     FLU VACCINE, INCREASED ANTIGEN, PRESV FREE, AGE 65+ [22731]  -     Vaccine Administration, Initial [17162]  -     ADMIN INFLUENZA (For MEDICARE Patients ONLY) []  Due for immunization  Patient got a flu shot in office today    Educated patient about new shingles vaccine    End of Life Planning:  Patient currently has an advanced directive: Yes.  Practitioner is supportive of decision.    COUNSELING:  Reviewed preventive health counseling, as  "reflected in patient instructions       Regular exercise       Healthy diet/nutrition    BP Readings from Last 1 Encounters:   09/28/18 130/70     Estimated body mass index is 28.6 kg/(m^2) as calculated from the following:    Height as of this encounter: 1.626 m (5' 4\").    Weight as of this encounter: 75.6 kg (166 lb 9.6 oz).     reports that she has never smoked. She has never used smokeless tobacco.    Appropriate preventive services were discussed with this patient, including applicable screening as appropriate for cardiovascular disease, diabetes, osteopenia/osteoporosis, and glaucoma.  As appropriate for age/gender, discussed screening for colorectal cancer, prostate cancer, breast cancer, and cervical cancer. Checklist reviewing preventive services available has been given to the patient.    Reviewed patients plan of care and provided an AVS. The Basic Care Plan (routine screening as documented in Health Maintenance) for Olivia meets the Care Plan requirement. This Care Plan has been established and reviewed with the Patient.    Counseling Resources:  ATP IV Guidelines  Pooled Cohorts Equation Calculator  Breast Cancer Risk Calculator  FRAX Risk Assessment  ICSI Preventive Guidelines  Dietary Guidelines for Americans, 2010  [employer] MyPlate  ASA Prophylaxis  Lung CA Screening    Patient Instructions   I refilled your prescriptions  Check with our pharmacy located in [address] about your insurance company coverage for new Shingles vaccine, which is now available  It's called SHINGRIX and is a series of two shots, 2-6 months apart  It is considered more than 90% effective  Follow up in 6 months  Seek sooner medical attention if there is any worsening of symptoms or problems    The information in this document, created by the medical scribe for me, accurately reflects the services I personally performed and the decisions made by me. I have reviewed and approved this document for accuracy prior to leaving the " patient care area.  September 28, 2018 11:23 AM    Ame Villalobos MD  [facility]    Injectable Influenza Immunization Documentation    1.  Is the person to be vaccinated sick today?   No    2. Does the person to be vaccinated have an allergy to a component   of the vaccine?   No  Egg Allergy Algorithm Link    3. Has the person to be vaccinated ever had a serious reaction   to influenza vaccine in the past?   No    4. Has the person to be vaccinated ever had Guillain-Barré syndrome?   No    Form completed by Judy Clacny CMA  Prior to injection verified patient identity using patient's name and date of birth.  Due to injection administration, patient instructed to remain in clinic for 15 minutes  afterwards, and to report any adverse reaction to me immediately.

## 2018-10-19 NOTE — PROGRESS NOTES
"MEMORY EVALUATION CLINIC - FOLLOWUP    INTERVAL HISTORY: Olivia is here with her  Kevin. She has advanced dementia and [facility] 8/30 this summer. Considering possible move to memory care facility. Also treating long-standing depression. She has mistaken recently her  for her father and sometimes thinks there are two of him. He has figured out how to talk her through it calmly. Can happen more commonly if she falls asleep in bed watching the news and wakes up and talks with him wondering who he is. Also can happen after waking from an afternoon nap. Never happens in AM. Also forgot details as to specifics of her past painful right sided trigeminal neuralgia. Both agree her memory is worse. Mental health really is quite good now on Effexor and Lexapro. Still on Aricept 10 mg daily. He helps with all IADLs. They have decided that Mi will be the next step as her dementia continues to progress and is on waiting list but not planning move in the near future quite yet.    REVIEW OF SYSTEMS: Detailed as above       OBJECTIVE: /72  Pulse 91  Temp 97  F (36.1  C) (Oral)  Ht 5' 4\" (1.626 m)  Wt 166 lb 11.2 oz (75.6 kg)  LMP 09/01/2000  SpO2 96%  Breastfeeding? No  BMI 28.61 kg/m2  Alert, pleasant, NAD, casually dressed and well groomed  Chronic right eye mild conjunctivitis r/t past surgical treatment for her trigeminal neuralgia  Walks without assist device  Cheerful and making jokes but lacks full insight into her cognitive impairment     Wt Readings from Last 4 Encounters:   10/22/18 166 lb 11.2 oz (75.6 kg)   09/28/18 166 lb 9.6 oz (75.6 kg)   06/29/18 168 lb 1.6 oz (76.2 kg)   05/04/18 167 lb (75.8 kg)     PHQ-9 SCORE 10/24/2017 5/4/2018 10/22/2018   Total Score - - -   Total Score 0 14 1       ASSESSMENT/PLAN: Olivia is here in follow-up of her dementia, likely of the Alzheimer's type with comorbid long-standing depression as well as family h/o Alzheimer's disease in her mother. Olivia is " really quite stable within her advanced dementia and has support from  and kids. Discussed medications, communication strategies and potential future move to [place] down-the-line. Glad PHQ is so improved (filled out in combo with her and Kevin today).     Patient Instructions   Continue same medication regimen    Follow up about April/May 2019 with [facility]CA      MDM: 25 minutes spent with patient, over 50% time counseling, coordinating care and explaining about nature of the patient's conditions.    Hilray Ledezma, DO  Internal Medicine and Geriatrics  [facility]

## 2018-10-22 ENCOUNTER — OFFICE VISIT (OUTPATIENT)
Dept: FAMILY MEDICINE | Facility: CLINIC | Age: 73
End: 2018-10-22
Payer: COMMERCIAL

## 2018-10-22 VITALS
DIASTOLIC BLOOD PRESSURE: 72 MMHG | TEMPERATURE: 97 F | SYSTOLIC BLOOD PRESSURE: 140 MMHG | HEART RATE: 91 BPM | OXYGEN SATURATION: 96 % | HEIGHT: 64 IN | BODY MASS INDEX: 28.46 KG/M2 | WEIGHT: 166.7 LBS

## 2018-10-22 DIAGNOSIS — F33.1 MAJOR DEPRESSIVE DISORDER, RECURRENT EPISODE, MODERATE (H): ICD-10-CM

## 2018-10-22 DIAGNOSIS — F03.90 DEMENTIA WITHOUT BEHAVIORAL DISTURBANCE, UNSPECIFIED DEMENTIA TYPE: Primary | Chronic | ICD-10-CM

## 2018-10-22 PROCEDURE — 99214 OFFICE O/P EST MOD 30 MIN: CPT | Performed by: INTERNAL MEDICINE

## 2018-10-22 NOTE — MR AVS SNAPSHOT
After Visit Summary   10/22/2018    Olivia Rodriguez    MRN: [id number]           Patient Information     Date Of Birth          1945        Visit Information        Provider Department      10/22/2018 12:00 PM Hilary Ledezma DO [facility]        Today's Diagnoses     Dementia without behavioral disturbance, unspecified dementia type    -  1    Major depressive disorder, recurrent episode, moderate (H)          Care Instructions    Continue same medication regimen    Follow up about April/May 2019          Follow-ups after your visit        Follow-up notes from your care team     Return in about 6 months (around 4/22/2019) for Routine Visit in memory care clinic.      Who to contact     If you have questions or need follow up information about today's clinic visit or your schedule please contact [facility] directly at [phone].  Normal or non-critical lab and imaging results will be communicated to you by MyChart, letter or phone within 4 business days after the clinic has received the results. If you do not hear from us within 7 days, please contact the clinic through [employer] or phone. If you have a critical or abnormal lab result, we will notify you by phone as soon as possible.  Submit refill requests through [employer] or call your pharmacy and they will forward the refill request to us. Please allow 3 business days for your refill to be completed.          Additional Information About Your Visit        [employer] Information     [employer] gives you secure access to your electronic health record. If you see a primary care provider, you can also send messages to your care team and make appointments. If you have questions, please call your primary care clinic.  If you do not have a primary care provider, please call [phone] and they will assist you.        Care EveryWhere ID     This is your Care EveryWhere ID. This could be used by other organizations to access your  "[facility] medical records  [id number]        Your Vitals Were     Pulse Temperature Height Last Period Pulse Oximetry Breastfeeding?    91 97  F (36.1  C) (Oral) 5' 4\" (1.626 m) 09/01/2000 96% No    BMI (Body Mass Index)                   28.61 kg/m2            Blood Pressure from Last 3 Encounters:   10/22/18 140/72   09/28/18 130/70   06/29/18 124/68    Weight from Last 3 Encounters:   10/22/18 166 lb 11.2 oz (75.6 kg)   09/28/18 166 lb 9.6 oz (75.6 kg)   06/29/18 168 lb 1.6 oz (76.2 kg)              We Performed the Following     PAF COMPLETED          Today's Medication Changes          These changes are accurate as of 10/22/18 11:38 PM.  If you have any questions, ask your nurse or doctor.               These medicines have changed or have updated prescriptions.        Dose/Directions    senna-docusate 8.6-50 MG per tablet   Commonly known as:  SENOKOT-S;PERICOLACE   This may have changed:    - when to take this  - reasons to take this   Used for:  Incisional hernia, without obstruction or gangrene        Dose:  1 tablet   Take 1 tablet by mouth 2 times daily   Quantity:  30 tablet   Refills:  0                Primary Care Provider Office Phone # Fax #    Ame GIANCARLO Villalobos [phone] [address] [address]        Equal Access to Services     CASI POSADA AH: Hadii hussein Sykes, waaxda luqadaha, qaybta kaalmada adeizabellayada, quinton davila . So [facility] [phone].    ATENCIÓN: Si habla español, tiene a perla disposición servicios gratuitos de asistencia lingüística. Llame al [phone].    We comply with applicable federal civil rights laws and [place] laws. We do not discriminate on the basis of race, color, national origin, age, disability, sex, sexual orientation, or gender identity.            Thank you!     Thank you for choosing [facility]  for your care. Our goal is always to provide you with excellent " care. Hearing back from our patients is one way we can continue to improve our services. Please take a few minutes to complete the written survey that you may receive in the mail after your visit with us. Thank you!             Your Updated Medication List - Protect others around you: Learn how to safely use, store and throw away your medicines at www.disposemymeds.org.          This list is accurate as of 10/22/18 11:38 PM.  Always use your most recent med list.                   Brand Name Dispense Instructions for use Diagnosis    ALLEGRA PO      Take 1 tablet by mouth daily        amLODIPine 2.5 MG tablet    NORVASC    90 tablet    Take 1 tablet (2.5 mg) by mouth daily    Essential hypertension       donepezil 10 MG tablet    ARICEPT    90 tablet    Take 1 tablet (10 mg) by mouth daily    Dementia without behavioral disturbance, unspecified dementia type       EFFEXOR XR PO      Take 300 mg by mouth daily (with breakfast) (Patient takes 2 x 150 mg)        escitalopram 10 MG tablet    LEXAPRO    45 tablet    Take 0.5 tablets (5 mg) by mouth daily    Major depressive disorder, recurrent episode, moderate (H)       EXCEDRIN PO      Take 1 tablet by mouth every morning (for morning headache)        hydrochlorothiazide 25 MG tablet    HYDRODIURIL    90 tablet    Take 1 tablet (25 mg) by mouth daily    Essential hypertension       lisinopril 40 MG tablet    PRINIVIL/ZESTRIL    90 tablet    TAKE 1 TABLET DAILY    Essential hypertension       polyethylene glycol powder    MIRALAX    510 g    Take 17 g by mouth daily as needed    Constipation       pravastatin 40 MG tablet    PRAVACHOL    90 tablet    Take 1 tablet (40 mg) by mouth At Bedtime    Hyperlipidemia LDL goal <100       senna-docusate 8.6-50 MG per tablet    SENOKOT-S;PERICOLACE    30 tablet    Take 1 tablet by mouth 2 times daily    Incisional hernia, without obstruction or gangrene       sulfamethoxazole-trimethoprim 800-160 MG per tablet    BACTRIM DS/SEPTRA  DS    30 tablet    TAKE HALF TABLET BY MOUTH DAILY AS NEEDED AFTER INTERCOURSE TO PREVENT UTI    Recurrent UTI

## 2018-10-23 ASSESSMENT — PATIENT HEALTH QUESTIONNAIRE - PHQ9: SUM OF ALL RESPONSES TO PHQ QUESTIONS 1-9: 1

## 2019-01-01 ENCOUNTER — OFFICE VISIT (OUTPATIENT)
Dept: URGENT CARE | Facility: URGENT CARE | Age: 74
End: 2019-01-01
Payer: COMMERCIAL

## 2019-01-01 ENCOUNTER — OFFICE VISIT (OUTPATIENT)
Dept: FAMILY MEDICINE | Facility: CLINIC | Age: 74
End: 2019-01-01
Payer: COMMERCIAL

## 2019-01-01 ENCOUNTER — ANCILLARY PROCEDURE (OUTPATIENT)
Dept: GENERAL RADIOLOGY | Facility: CLINIC | Age: 74
End: 2019-01-01
Attending: PREVENTIVE MEDICINE
Payer: COMMERCIAL

## 2019-01-01 ENCOUNTER — DOCUMENTATION ONLY (OUTPATIENT)
Dept: OTHER | Facility: CLINIC | Age: 74
End: 2019-01-01

## 2019-01-01 VITALS
WEIGHT: 156 LBS | SYSTOLIC BLOOD PRESSURE: 130 MMHG | OXYGEN SATURATION: 98 % | DIASTOLIC BLOOD PRESSURE: 80 MMHG | BODY MASS INDEX: 26.78 KG/M2 | TEMPERATURE: 97.6 F | HEART RATE: 95 BPM

## 2019-01-01 VITALS
BODY MASS INDEX: 28 KG/M2 | HEART RATE: 76 BPM | TEMPERATURE: 97 F | DIASTOLIC BLOOD PRESSURE: 76 MMHG | WEIGHT: 164 LBS | HEIGHT: 64 IN | OXYGEN SATURATION: 98 % | SYSTOLIC BLOOD PRESSURE: 138 MMHG

## 2019-01-01 VITALS
DIASTOLIC BLOOD PRESSURE: 71 MMHG | BODY MASS INDEX: 28 KG/M2 | OXYGEN SATURATION: 95 % | SYSTOLIC BLOOD PRESSURE: 114 MMHG | HEIGHT: 64 IN | TEMPERATURE: 97.8 F | HEART RATE: 80 BPM | WEIGHT: 164 LBS

## 2019-01-01 DIAGNOSIS — F03.90 DEMENTIA WITHOUT BEHAVIORAL DISTURBANCE, UNSPECIFIED DEMENTIA TYPE: ICD-10-CM

## 2019-01-01 DIAGNOSIS — I10 ESSENTIAL HYPERTENSION: ICD-10-CM

## 2019-01-01 DIAGNOSIS — E78.5 HYPERLIPIDEMIA LDL GOAL <100: ICD-10-CM

## 2019-01-01 DIAGNOSIS — Z00.00 ENCOUNTER FOR MEDICARE ANNUAL WELLNESS EXAM: Primary | ICD-10-CM

## 2019-01-01 DIAGNOSIS — F41.9 ANXIETY: ICD-10-CM

## 2019-01-01 DIAGNOSIS — F03.90 DEMENTIA WITHOUT BEHAVIORAL DISTURBANCE, UNSPECIFIED DEMENTIA TYPE: Primary | Chronic | ICD-10-CM

## 2019-01-01 DIAGNOSIS — R10.84 ABDOMINAL PAIN, GENERALIZED: ICD-10-CM

## 2019-01-01 DIAGNOSIS — R10.84 ABDOMINAL PAIN, GENERALIZED: Primary | ICD-10-CM

## 2019-01-01 LAB
ALBUMIN SERPL-MCNC: 4 G/DL (ref 3.4–5)
ALBUMIN UR-MCNC: NEGATIVE MG/DL
ALP SERPL-CCNC: 100 U/L (ref 40–150)
ALT SERPL W P-5'-P-CCNC: 26 U/L (ref 0–50)
ANION GAP SERPL CALCULATED.3IONS-SCNC: 6 MMOL/L (ref 3–14)
APPEARANCE UR: CLEAR
AST SERPL W P-5'-P-CCNC: 16 U/L (ref 0–45)
BACTERIA #/AREA URNS HPF: ABNORMAL /HPF
BACTERIA SPEC CULT: NORMAL
BASOPHILS # BLD AUTO: 0 10E9/L (ref 0–0.2)
BASOPHILS NFR BLD AUTO: 0.7 %
BILIRUB SERPL-MCNC: 0.4 MG/DL (ref 0.2–1.3)
BILIRUB UR QL STRIP: NEGATIVE
BUN SERPL-MCNC: 35 MG/DL (ref 7–30)
CALCIUM SERPL-MCNC: 9.4 MG/DL (ref 8.5–10.1)
CHLORIDE SERPL-SCNC: 103 MMOL/L (ref 94–109)
CHOLEST SERPL-MCNC: 253 MG/DL
CO2 SERPL-SCNC: 27 MMOL/L (ref 20–32)
COLOR UR AUTO: YELLOW
CREAT SERPL-MCNC: 1.13 MG/DL (ref 0.52–1.04)
CRP SERPL-MCNC: <2.9 MG/L (ref 0–8)
DIFFERENTIAL METHOD BLD: ABNORMAL
EOSINOPHIL # BLD AUTO: 0.1 10E9/L (ref 0–0.7)
EOSINOPHIL NFR BLD AUTO: 2.6 %
ERYTHROCYTE [DISTWIDTH] IN BLOOD BY AUTOMATED COUNT: 15.8 % (ref 10–15)
GFR SERPL CREATININE-BSD FRML MDRD: 47 ML/MIN/{1.73_M2}
GLUCOSE SERPL-MCNC: 135 MG/DL (ref 70–99)
GLUCOSE UR STRIP-MCNC: NEGATIVE MG/DL
HCT VFR BLD AUTO: 42.6 % (ref 35–47)
HDLC SERPL-MCNC: 82 MG/DL
HGB BLD-MCNC: 13.7 G/DL (ref 11.7–15.7)
HGB UR QL STRIP: NEGATIVE
KETONES UR STRIP-MCNC: NEGATIVE MG/DL
LDLC SERPL CALC-MCNC: 134 MG/DL
LEUKOCYTE ESTERASE UR QL STRIP: ABNORMAL
LIPASE SERPL-CCNC: 286 U/L (ref 73–393)
LYMPHOCYTES # BLD AUTO: 1.8 10E9/L (ref 0.8–5.3)
LYMPHOCYTES NFR BLD AUTO: 32.1 %
MCH RBC QN AUTO: 27.6 PG (ref 26.5–33)
MCHC RBC AUTO-ENTMCNC: 32.2 G/DL (ref 31.5–36.5)
MCV RBC AUTO: 86 FL (ref 78–100)
MONOCYTES # BLD AUTO: 0.5 10E9/L (ref 0–1.3)
MONOCYTES NFR BLD AUTO: 8.8 %
NEUTROPHILS # BLD AUTO: 3 10E9/L (ref 1.6–8.3)
NEUTROPHILS NFR BLD AUTO: 55.8 %
NITRATE UR QL: NEGATIVE
NON-SQ EPI CELLS #/AREA URNS LPF: ABNORMAL /LPF
NONHDLC SERPL-MCNC: 171 MG/DL
PH UR STRIP: 5.5 PH (ref 5–7)
PLATELET # BLD AUTO: 348 10E9/L (ref 150–450)
POTASSIUM SERPL-SCNC: 4.1 MMOL/L (ref 3.4–5.3)
PROT SERPL-MCNC: 7.7 G/DL (ref 6.8–8.8)
RBC # BLD AUTO: 4.96 10E12/L (ref 3.8–5.2)
RBC #/AREA URNS AUTO: ABNORMAL /HPF
RBC CASTS #/AREA URNS LPF: ABNORMAL /LPF
SODIUM SERPL-SCNC: 136 MMOL/L (ref 133–144)
SOURCE: ABNORMAL
SP GR UR STRIP: 1.02 (ref 1–1.03)
SPECIMEN SOURCE: NORMAL
TRIGL SERPL-MCNC: 186 MG/DL
UROBILINOGEN UR STRIP-ACNC: 0.2 EU/DL (ref 0.2–1)
WBC # BLD AUTO: 5.5 10E9/L (ref 4–11)
WBC #/AREA URNS AUTO: ABNORMAL /HPF

## 2019-01-01 PROCEDURE — 99214 OFFICE O/P EST MOD 30 MIN: CPT | Performed by: INTERNAL MEDICINE

## 2019-01-01 PROCEDURE — 74019 RADEX ABDOMEN 2 VIEWS: CPT

## 2019-01-01 PROCEDURE — 36415 COLL VENOUS BLD VENIPUNCTURE: CPT | Performed by: PREVENTIVE MEDICINE

## 2019-01-01 PROCEDURE — 80053 COMPREHEN METABOLIC PANEL: CPT | Performed by: PREVENTIVE MEDICINE

## 2019-01-01 PROCEDURE — 80061 LIPID PANEL: CPT | Performed by: INTERNAL MEDICINE

## 2019-01-01 PROCEDURE — 99214 OFFICE O/P EST MOD 30 MIN: CPT | Performed by: PREVENTIVE MEDICINE

## 2019-01-01 PROCEDURE — 85025 COMPLETE CBC W/AUTO DIFF WBC: CPT | Performed by: PREVENTIVE MEDICINE

## 2019-01-01 PROCEDURE — 99397 PER PM REEVAL EST PAT 65+ YR: CPT | Performed by: INTERNAL MEDICINE

## 2019-01-01 PROCEDURE — 83690 ASSAY OF LIPASE: CPT | Performed by: PREVENTIVE MEDICINE

## 2019-01-01 PROCEDURE — 81001 URINALYSIS AUTO W/SCOPE: CPT | Performed by: PREVENTIVE MEDICINE

## 2019-01-01 PROCEDURE — 99213 OFFICE O/P EST LOW 20 MIN: CPT | Mod: 25 | Performed by: INTERNAL MEDICINE

## 2019-01-01 PROCEDURE — 87086 URINE CULTURE/COLONY COUNT: CPT | Performed by: PREVENTIVE MEDICINE

## 2019-01-01 PROCEDURE — 86140 C-REACTIVE PROTEIN: CPT | Performed by: PREVENTIVE MEDICINE

## 2019-01-01 PROCEDURE — 36415 COLL VENOUS BLD VENIPUNCTURE: CPT | Performed by: INTERNAL MEDICINE

## 2019-01-01 RX ORDER — NITROFURANTOIN 25; 75 MG/1; MG/1
100 CAPSULE ORAL 2 TIMES DAILY
Qty: 10 CAPSULE | Refills: 0 | Status: CANCELLED | OUTPATIENT
Start: 2019-01-01 | End: 2019-01-01

## 2019-01-01 RX ORDER — PRAVASTATIN SODIUM 40 MG
40 TABLET ORAL AT BEDTIME
Qty: 90 TABLET | Refills: 3 | Status: SHIPPED | OUTPATIENT
Start: 2019-01-01

## 2019-01-01 RX ORDER — LISINOPRIL 40 MG/1
TABLET ORAL
Qty: 90 TABLET | Refills: 0 | Status: SHIPPED | OUTPATIENT
Start: 2019-01-01 | End: 2019-01-01

## 2019-01-01 RX ORDER — DONEPEZIL HYDROCHLORIDE 10 MG/1
10 TABLET, FILM COATED ORAL DAILY
Qty: 90 TABLET | Refills: 3 | Status: SHIPPED | OUTPATIENT
Start: 2019-01-01 | End: 2020-01-01 | Stop reason: SINTOL

## 2019-01-01 RX ORDER — HYDROCHLOROTHIAZIDE 25 MG/1
25 TABLET ORAL DAILY
Qty: 90 TABLET | Refills: 3 | Status: SHIPPED | OUTPATIENT
Start: 2019-01-01 | End: 2020-01-01

## 2019-01-01 RX ORDER — AMLODIPINE BESYLATE 2.5 MG/1
2.5 TABLET ORAL DAILY
Qty: 90 TABLET | Refills: 1 | Status: SHIPPED | OUTPATIENT
Start: 2019-01-01 | End: 2020-01-01

## 2019-01-01 RX ORDER — LISINOPRIL 40 MG/1
40 TABLET ORAL DAILY
Qty: 90 TABLET | Refills: 3 | Status: SHIPPED | OUTPATIENT
Start: 2019-01-01

## 2019-01-01 ASSESSMENT — MIFFLIN-ST. JEOR
SCORE: 1218.9
SCORE: 1218.9

## 2019-01-01 ASSESSMENT — ACTIVITIES OF DAILY LIVING (ADL)
CURRENT_FUNCTION: MONEY MANAGEMENT REQUIRES ASSISTANCE
CURRENT_FUNCTION: PREPARING MEALS REQUIRES ASSISTANCE
CURRENT_FUNCTION: MEDICATION ADMINISTRATION REQUIRES ASSISTANCE
CURRENT_FUNCTION: MONEY MANAGEMENT REQUIRES ASSISTANCE
CURRENT_FUNCTION: SHOPPING REQUIRES ASSISTANCE
CURRENT_FUNCTION: MEDICATION ADMINISTRATION REQUIRES ASSISTANCE
CURRENT_FUNCTION: BATHING REQUIRES ASSISTANCE
CURRENT_FUNCTION: PREPARING MEALS REQUIRES ASSISTANCE
CURRENT_FUNCTION: TRANSPORTATION REQUIRES ASSISTANCE
CURRENT_FUNCTION: TRANSPORTATION REQUIRES ASSISTANCE
CURRENT_FUNCTION: BATHING REQUIRES ASSISTANCE
CURRENT_FUNCTION: SHOPPING REQUIRES ASSISTANCE

## 2019-02-04 ENCOUNTER — HOSPITAL ENCOUNTER (OUTPATIENT)
Dept: MAMMOGRAPHY | Facility: CLINIC | Age: 74
Discharge: HOME | End: 2019-02-04
Attending: INTERNAL MEDICINE | Admitting: INTERNAL MEDICINE
Payer: COMMERCIAL

## 2019-02-04 DIAGNOSIS — Z12.31 VISIT FOR SCREENING MAMMOGRAM: ICD-10-CM

## 2019-02-04 PROCEDURE — 77063 BREAST TOMOSYNTHESIS BI: CPT

## 2019-03-26 DIAGNOSIS — F33.1 MAJOR DEPRESSIVE DISORDER, RECURRENT EPISODE, MODERATE (H): ICD-10-CM

## 2019-03-26 NOTE — TELEPHONE ENCOUNTER
"Pending Prescriptions:                       Disp   Refills    escitalopram (LEXAPRO) 10 MG tablet [Phar*45 tab*0            Sig: TAKE HALF TABLET BY MOUTH ONCE DAILY    Last Written Prescription Date:  9/28/18  Last Fill Quantity: 45,  # refills: 1   Last office visit: 10/22/2018 with prescribing provider:     Future Office Visit:   Next 5 appointments (look out 90 days)    Apr 03, 2019 11:00 AM CDT  Office Visit with Hilary Ledezma,   [facility] ([facility] [address] [facility] [address]  [phone]         Requested Prescriptions   Pending Prescriptions Disp Refills     escitalopram (LEXAPRO) 10 MG tablet [Pharmacy Med Name: Escitalopram Oxalate Oral Tablet 10 MG] 45 tablet 0     Sig: TAKE HALF TABLET BY MOUTH ONCE DAILY    SSRIs Protocol Passed - 3/26/2019 11:23 AM       Passed - PHQ-9 score less than 5 in past 6 months    Please review last PHQ-9 score.          Passed - Medication is active on med list       Passed - Patient is age 18 or older       Passed - No active pregnancy on record       Passed - No positive pregnancy test in last 12 months       Passed - Recent (6 mo) or future (30 days) visit within the authorizing provider's specialty    Patient had office visit in the last 6 months or has a visit in the next 30 days with authorizing provider or within the authorizing provider's specialty.  See \"Patient Info\" tab in inbasket, or \"Choose Columns\" in Meds & Orders section of the refill encounter.              "

## 2019-03-27 RX ORDER — ESCITALOPRAM OXALATE 10 MG/1
TABLET ORAL
Qty: 45 TABLET | Refills: 0 | Status: SHIPPED | OUTPATIENT
Start: 2019-03-27 | End: 2019-06-26

## 2019-03-27 NOTE — TELEPHONE ENCOUNTER
PHQ-9 SCORE 10/24/2017 5/4/2018 10/22/2018   PHQ-9 Total Score - - -   PHQ-9 Total Score 0 14 1     Prescription approved per [facility] Refill Protocol.  Deonna SPRINGER RN

## 2019-04-03 ENCOUNTER — OFFICE VISIT (OUTPATIENT)
Dept: FAMILY MEDICINE | Facility: CLINIC | Age: 74
End: 2019-04-03
Payer: COMMERCIAL

## 2019-04-03 VITALS
HEART RATE: 105 BPM | SYSTOLIC BLOOD PRESSURE: 139 MMHG | DIASTOLIC BLOOD PRESSURE: 85 MMHG | HEIGHT: 64 IN | WEIGHT: 162.1 LBS | TEMPERATURE: 98.3 F | BODY MASS INDEX: 27.68 KG/M2 | OXYGEN SATURATION: 94 %

## 2019-04-03 DIAGNOSIS — F33.1 MAJOR DEPRESSIVE DISORDER, RECURRENT EPISODE, MODERATE (H): ICD-10-CM

## 2019-04-03 DIAGNOSIS — F03.90 DEMENTIA WITHOUT BEHAVIORAL DISTURBANCE, UNSPECIFIED DEMENTIA TYPE: Primary | Chronic | ICD-10-CM

## 2019-04-03 PROCEDURE — 99214 OFFICE O/P EST MOD 30 MIN: CPT | Performed by: INTERNAL MEDICINE

## 2019-04-03 RX ORDER — VENLAFAXINE HYDROCHLORIDE 150 MG/1
300 CAPSULE, EXTENDED RELEASE ORAL DAILY
Qty: 180 CAPSULE | Refills: 2 | Status: SHIPPED | OUTPATIENT
Start: 2019-04-03 | End: 2020-01-01

## 2019-04-03 ASSESSMENT — MIFFLIN-ST. JEOR: SCORE: 1210.28

## 2019-04-03 NOTE — PROGRESS NOTES
"MEMORY EVALUATION CLINIC - FOLLOWUP    INTERVAL HISTORY: Olivia is here today with her  Kevin in follow up of dementia. Its been a tough winter. Still having trouble recognizing Kevin in the evening as her ; doesn't seem to be scared though she asks several questions. She is shocked to hear this today but it doesn't upset her to discuss it. Kevin has gotten used to it as well so it doesn't upset him any longer and he does a great job of redirection.    Still considering [facility] memory care down-the-line and she is aware of that and it doesn't bother her and amazingly she is able to talk about it today with some vague awareness. They are currently happy in their one-level condo. They have 5 kids and especially daughter Jeanette is willing to step up and help as needed since she is currently unemployed. Mostly Kevin and Olivia do everything together and that works well; only occasionally he goes to [employer] or a meal and leaves her alone for a couple hours. But will now make sure to have Jeanette stay with Olivia just in case of emergency as she really should have 24-7 oversight.     They have discussed meeting with their 5 kids to discuss a \"Plan B\" if Kevin isn't around to help Olivia.     They also request handicap sticker for when he drives as they walk together into shops and she doesn't feel comfortable being left off at the door while he otto. No falls this winter but there were some slips and close calls.    Mood down recently due loss of cousin Rema and recent now resolved upper respiratory infection. Has been on Effexor for a long time and would like it renewed. Continues on Lexapro and Donepezil.    ADLs: Independent  IADLs: Dependent on  Kevin  Home situation: One level condo with   Support:  and 5 kids  Behaviors/Hallucinations/Delusions: None      REVIEW OF SYSTEMS: Detailed as above       OBJECTIVE: /85 (BP Location: Right arm, Cuff Size: Adult Large)   Pulse 105   " "Temp 98.3  F (36.8  C) (Oral)   Ht 1.626 m (5' 4\")   Wt 73.5 kg (162 lb 1.6 oz)   LMP 09/01/2000   SpO2 94%   BMI 27.82 kg/m    Alert, pleasant, NAD  Casually dressed, no odor, appears well cared for  Admits to getting slightly anxious as our visit progresses  Walks slowly but without assist device  Mood euthymic      ASSESSMENT/PLAN: Olivia is here in follow-up of her dementia, likely of the Alzheimer's type with comorbid long-standing depression as well as family h/o Alzheimer's disease in her mother. Olivia is really quite stable within her advanced dementia and has support from  and kids. Discussed medications, communication strategies and potential future move to [facility] down-the-line but for now they are stable in current home environment. See patient instructions below regarding other topics discussed today. She declined MoCA today (which is reasonable as it was 8/30 in June 2018); they'd consider MoCA at next visit this fall though utility of the test may be low.        Patient Instructions   You really should have 24-7 supervision so have Jeanette or another friend/family member stay with you if Kevin is out of the house    I agree you should meet with your kids for \"Plan B\" if Kevin isn't available to help care for you - you can also discuss with Alzheimer's Association how to draft documents about legal matters if Kevin isn't able to help with them    The 24-7 Alzheimer's Association Hotline is: 1-669.491.7640.  We are located in the [facility] Chapter.  The Alzheimer's Association helps anyone with memory loss, even if they don't have Alzheimer's Disease.    Al[employer] Local Chapter  [phone] (phone)  [address] [facility]  [place] [address]    Follow up in about 6 months        MDM: 35 minutes spent with patient, over 50% time counseling, coordinating care and explaining about nature of the patient's conditions.    Hilary Ledezma, DO  Internal Medicine and " Geriatrics  [facility]

## 2019-04-03 NOTE — PATIENT INSTRUCTIONS
"You really should have 24-7 supervision so have Jeanette or another friend/family member stay with you if Kevin is out of the house    I agree you should meet with your kids for \"Plan B\" if Kevin isn't available to help care for you - you can also discuss with Alzheimer's Association how to draft documents about legal matters if Kevin isn't able to help with them    The 24-7 Alzheimer's Association Hotline is: 1-765.926.1346.  We are located in the [facility] [place].  The Alzheimer's Association helps anyone with memory loss, even if they don't have Alzheimer's Disease.    Al[employer] Local Chapter  [phone] (phone)  [address] [facility]  [place] [address]    Follow up in about 6 months    "

## 2019-05-21 ENCOUNTER — TRANSFERRED RECORDS (OUTPATIENT)
Dept: HEALTH INFORMATION MANAGEMENT | Facility: CLINIC | Age: 74
End: 2019-05-21

## 2019-06-10 ENCOUNTER — MYC MEDICAL ADVICE (OUTPATIENT)
Dept: FAMILY MEDICINE | Facility: CLINIC | Age: 74
End: 2019-06-10

## 2019-06-11 ENCOUNTER — TELEPHONE (OUTPATIENT)
Dept: FAMILY MEDICINE | Facility: CLINIC | Age: 74
End: 2019-06-11

## 2019-06-11 NOTE — TELEPHONE ENCOUNTER
HiKevin called to say Olivia is suffering from some severe alzheimer symptoms right now and he is hoping she could be admitted to [facility] for some help, he is wondering if you could authorize admission to [facility]? Please call him at [phone] (home) or [phone] (cell).     Thanks,  Priti

## 2019-06-11 NOTE — TELEPHONE ENCOUNTER
Spoke with  Kevin this morning. Past two weeks she has had significant sundowning symptoms but doing well during the AM and daytime hours (including now). Thus, unlikely acute infection. Discussed redirection tactics and he will also call [facility] to get process rolling for admission to memory care. He will keep me updated.

## 2019-06-25 ENCOUNTER — MEDICAL CORRESPONDENCE (OUTPATIENT)
Dept: HEALTH INFORMATION MANAGEMENT | Facility: CLINIC | Age: 74
End: 2019-06-25

## 2019-06-26 ENCOUNTER — TRANSFERRED RECORDS (OUTPATIENT)
Dept: HEALTH INFORMATION MANAGEMENT | Facility: CLINIC | Age: 74
End: 2019-06-26

## 2019-06-26 ENCOUNTER — TELEPHONE (OUTPATIENT)
Dept: FAMILY MEDICINE | Facility: CLINIC | Age: 74
End: 2019-06-26

## 2019-06-26 DIAGNOSIS — F33.1 MAJOR DEPRESSIVE DISORDER, RECURRENT EPISODE, MODERATE (H): ICD-10-CM

## 2019-06-26 NOTE — TELEPHONE ENCOUNTER
Received paperwork to fill out for LTC insurance in regards to Olivia's dementia. Also a nice note from  Kevin re: placing her on a list for [facility] memory care unit and he reports when I talk with him on the phone that they plan to make the move in July. He also detailed in the letter that since seeing me last she has wandered away twice (once while he was in the bathroom and once where she woke up before him). They found her once at [facility] and once at [facility]. Agree with next steps to continue to pursue memory care.    In terms of ADLs, she needs help in and out of shower and reminders. Needs some prompts in hygiene in regards to b/b continence. Needs help with putting on bra and prompts with picking out appropriate attire for the occasion. In terms of eating, toileting and transferring just needs reminders/cues.     Forms completed for [employer] and given to ELODIA Del Toro for faxing. Please keep copy in accordion.     Thanks!      Hilary Ledezma, DO

## 2019-06-27 NOTE — TELEPHONE ENCOUNTER
Faxed to [place] - copy placed in accordion file on 5th floor - original given to Dr. Villalobos for her files, left on her desk.

## 2019-06-27 NOTE — TELEPHONE ENCOUNTER
"Last Written Prescription Date:  3/27/19  Last Fill Quantity: 45 tablet,  # refills: 0   Last office visit: 9/28/2018 with prescribing provider:  Wilfredo   Future Office Visit:      [facility] Follow-up to PHQ 10/24/2017 5/4/2018 10/22/2018   PHQ-9 9. Suicide Ideation past 2 weeks Not at all Not at all Not at all     No flowsheet data found.      Requested Prescriptions   Pending Prescriptions Disp Refills     escitalopram (LEXAPRO) 10 MG tablet [Pharmacy Med Name: Escitalopram Oxalate Oral Tablet 10 MG] 45 tablet 0     Sig: TAKE HALF TABLET BY MOUTH ONCE DAILY       SSRIs Protocol Failed - 6/26/2019  4:15 PM        Failed - PHQ-9 score less than 5 in past 6 months     Please review last PHQ-9 score.           Passed - Medication is active on med list        Passed - Patient is age 18 or older        Passed - No active pregnancy on record        Passed - No positive pregnancy test in last 12 months        Passed - Recent (6 mo) or future (30 days) visit within the authorizing provider's specialty     Patient had office visit in the last 6 months or has a visit in the next 30 days with authorizing provider or within the authorizing provider's specialty.  See \"Patient Info\" tab in inbasket, or \"Choose Columns\" in Meds & Orders section of the refill encounter.              "

## 2019-06-29 RX ORDER — ESCITALOPRAM OXALATE 10 MG/1
TABLET ORAL
Qty: 15 TABLET | Refills: 0 | Status: SHIPPED | OUTPATIENT
Start: 2019-06-29 | End: 2019-08-01

## 2019-06-29 NOTE — TELEPHONE ENCOUNTER
Pt is due for follow-up OV. Refilled #30 with note to pharmacy to inform patient to schedule an appointment

## 2019-07-24 DIAGNOSIS — F03.90 DEMENTIA WITHOUT BEHAVIORAL DISTURBANCE, UNSPECIFIED DEMENTIA TYPE: ICD-10-CM

## 2019-07-25 RX ORDER — DONEPEZIL HYDROCHLORIDE 10 MG/1
TABLET, FILM COATED ORAL
Qty: 90 TABLET | Refills: 0 | Status: SHIPPED | OUTPATIENT
Start: 2019-07-25 | End: 2019-08-01

## 2019-07-25 NOTE — TELEPHONE ENCOUNTER
Prescription approved per [facility] Refill Protocol.    Dutch ANTHONY RN

## 2019-07-25 NOTE — TELEPHONE ENCOUNTER
"Last Written Prescription Date:  6/29/18  Last Fill Quantity: 90 tablet,  # refills: 3   Last office visit: 4/3/2019 with prescribing provider:  JOSE DANIEL Ledezma   Future Office Visit:      Requested Prescriptions   Pending Prescriptions Disp Refills     donepezil (ARICEPT) 10 MG tablet [Pharmacy Med Name: Donepezil HCl Oral Tablet 10 MG] 90 tablet 2     Sig: TAKE 1 TABLET BY MOUTH ONCE DAILY       Miscellaneous Dementia Agents Passed - 7/24/2019  3:44 PM        Passed - Recent (12 mo) or future (30 days) visit within the authorizing provider's specialty     Patient had office visit in the last 12 months or has a visit in the next 30 days with authorizing provider or within the authorizing provider's specialty.  See \"Patient Info\" tab in inbasket, or \"Choose Columns\" in Meds & Orders section of the refill encounter.              Passed - Medication is active on med list        Passed - Patient is 18 years of age or older          "

## 2019-08-01 ENCOUNTER — OFFICE VISIT (OUTPATIENT)
Dept: FAMILY MEDICINE | Facility: CLINIC | Age: 74
End: 2019-08-01
Payer: COMMERCIAL

## 2019-08-01 VITALS
WEIGHT: 155.7 LBS | TEMPERATURE: 98.7 F | HEART RATE: 109 BPM | SYSTOLIC BLOOD PRESSURE: 128 MMHG | HEIGHT: 64 IN | BODY MASS INDEX: 26.58 KG/M2 | OXYGEN SATURATION: 100 % | DIASTOLIC BLOOD PRESSURE: 78 MMHG

## 2019-08-01 DIAGNOSIS — F03.90 DEMENTIA WITHOUT BEHAVIORAL DISTURBANCE, UNSPECIFIED DEMENTIA TYPE: Primary | ICD-10-CM

## 2019-08-01 DIAGNOSIS — I10 ESSENTIAL HYPERTENSION: ICD-10-CM

## 2019-08-01 DIAGNOSIS — Z13.0 SCREENING FOR DEFICIENCY ANEMIA: ICD-10-CM

## 2019-08-01 DIAGNOSIS — G30.1 LATE ONSET ALZHEIMER'S DISEASE WITHOUT BEHAVIORAL DISTURBANCE (H): ICD-10-CM

## 2019-08-01 DIAGNOSIS — Z85.3 PERSONAL HISTORY OF MALIGNANT NEOPLASM OF BREAST: ICD-10-CM

## 2019-08-01 DIAGNOSIS — K59.01 SLOW TRANSIT CONSTIPATION: ICD-10-CM

## 2019-08-01 DIAGNOSIS — F02.80 LATE ONSET ALZHEIMER'S DISEASE WITHOUT BEHAVIORAL DISTURBANCE (H): ICD-10-CM

## 2019-08-01 DIAGNOSIS — E78.2 MIXED HYPERLIPIDEMIA: ICD-10-CM

## 2019-08-01 DIAGNOSIS — Z13.29 SCREENING FOR THYROID DISORDER: ICD-10-CM

## 2019-08-01 DIAGNOSIS — H61.23 BILATERAL IMPACTED CERUMEN: ICD-10-CM

## 2019-08-01 DIAGNOSIS — H61.21 IMPACTED CERUMEN OF RIGHT EAR: ICD-10-CM

## 2019-08-01 DIAGNOSIS — N39.0 RECURRENT UTI: ICD-10-CM

## 2019-08-01 DIAGNOSIS — F33.1 MAJOR DEPRESSIVE DISORDER, RECURRENT EPISODE, MODERATE (H): ICD-10-CM

## 2019-08-01 LAB
BASOPHILS # BLD AUTO: 0 10E9/L (ref 0–0.2)
BASOPHILS NFR BLD AUTO: 0.5 %
DIFFERENTIAL METHOD BLD: NORMAL
EOSINOPHIL # BLD AUTO: 0.1 10E9/L (ref 0–0.7)
EOSINOPHIL NFR BLD AUTO: 1.9 %
ERYTHROCYTE [DISTWIDTH] IN BLOOD BY AUTOMATED COUNT: 14.7 % (ref 10–15)
HCT VFR BLD AUTO: 43.3 % (ref 35–47)
HGB BLD-MCNC: 14.2 G/DL (ref 11.7–15.7)
LYMPHOCYTES # BLD AUTO: 1.5 10E9/L (ref 0.8–5.3)
LYMPHOCYTES NFR BLD AUTO: 25.5 %
MCH RBC QN AUTO: 27.7 PG (ref 26.5–33)
MCHC RBC AUTO-ENTMCNC: 32.8 G/DL (ref 31.5–36.5)
MCV RBC AUTO: 85 FL (ref 78–100)
MONOCYTES # BLD AUTO: 0.6 10E9/L (ref 0–1.3)
MONOCYTES NFR BLD AUTO: 10.6 %
NEUTROPHILS # BLD AUTO: 3.6 10E9/L (ref 1.6–8.3)
NEUTROPHILS NFR BLD AUTO: 61.5 %
PLATELET # BLD AUTO: 403 10E9/L (ref 150–450)
RBC # BLD AUTO: 5.12 10E12/L (ref 3.8–5.2)
WBC # BLD AUTO: 5.8 10E9/L (ref 4–11)

## 2019-08-01 PROCEDURE — 84443 ASSAY THYROID STIM HORMONE: CPT | Performed by: INTERNAL MEDICINE

## 2019-08-01 PROCEDURE — 80061 LIPID PANEL: CPT | Performed by: INTERNAL MEDICINE

## 2019-08-01 PROCEDURE — 36415 COLL VENOUS BLD VENIPUNCTURE: CPT | Performed by: INTERNAL MEDICINE

## 2019-08-01 PROCEDURE — 99215 OFFICE O/P EST HI 40 MIN: CPT | Performed by: INTERNAL MEDICINE

## 2019-08-01 PROCEDURE — 99207 C PAF COMPLETED  NO CHARGE: CPT | Performed by: INTERNAL MEDICINE

## 2019-08-01 PROCEDURE — 85025 COMPLETE CBC W/AUTO DIFF WBC: CPT | Performed by: INTERNAL MEDICINE

## 2019-08-01 PROCEDURE — 80053 COMPREHEN METABOLIC PANEL: CPT | Performed by: INTERNAL MEDICINE

## 2019-08-01 RX ORDER — AMOXICILLIN 250 MG
1 CAPSULE ORAL 2 TIMES DAILY PRN
COMMUNITY
Start: 2019-08-01 | End: 2020-01-01

## 2019-08-01 RX ORDER — SULFAMETHOXAZOLE/TRIMETHOPRIM 800-160 MG
TABLET ORAL
Qty: 30 TABLET | Refills: 1 | Status: ON HOLD | OUTPATIENT
Start: 2019-08-01 | End: 2020-01-01

## 2019-08-01 RX ORDER — PRAVASTATIN SODIUM 40 MG
40 TABLET ORAL AT BEDTIME
Qty: 90 TABLET | Refills: 3 | Status: SHIPPED | OUTPATIENT
Start: 2019-08-01 | End: 2019-01-01

## 2019-08-01 RX ORDER — DONEPEZIL HYDROCHLORIDE 10 MG/1
10 TABLET, FILM COATED ORAL DAILY
Qty: 90 TABLET | Refills: 3 | Status: SHIPPED | OUTPATIENT
Start: 2019-08-01 | End: 2019-01-01

## 2019-08-01 RX ORDER — LISINOPRIL 40 MG/1
40 TABLET ORAL DAILY
Qty: 90 TABLET | Refills: 3 | Status: SHIPPED | OUTPATIENT
Start: 2019-08-01 | End: 2019-01-01

## 2019-08-01 RX ORDER — HYDROCHLOROTHIAZIDE 25 MG/1
25 TABLET ORAL DAILY
Qty: 90 TABLET | Refills: 3 | Status: SHIPPED | OUTPATIENT
Start: 2019-08-01 | End: 2019-01-01

## 2019-08-01 ASSESSMENT — MIFFLIN-ST. JEOR: SCORE: 1181.25

## 2019-08-01 NOTE — LETTER
[facility]  [address] [facility]  [address]  [place]  [address]  Tel: [phone]    August 2, 2019    Olivia F Michael  [address]  [facility] [address]        Dear Ms. Rodriguez,    This is to inform you regarding your test result.    TSH which is thyroid hormone is normal.  The testing of your kidney function, liver function and electrolytes was satisfactory   Your total cholesterol is elevated.  The triglycerides are high. Lowering  the amount of sugar ,alcohol and sweets in the diet helps to control this.Exercise and weight loss helps.  HDL which is called good cholesterol is normal.  Your LDL which is called bad cholesterol is elevated.  Eat low cholesterol low fat  diet and do regular physical activity. Avoid high sugar containing food.  Have you been taking your cholesterol letter lowering medication?  Please let us know  If you would like to see a dietitian regarding this then please let us know so we can put a referral for you to see a dietitian.  CBC result which includes white count Hemoglobin and  Platelet Counts is normal.        If you have any further questions or problems, please contact our office.      Sincerely,    Ame Villalobos MD/ Sandy Reyes CMA  Results for orders placed or performed in visit on 08/01/19   TSH with free T4 reflex   Result Value Ref Range    TSH 1.63 0.40 - 4.00 mU/L   Lipid panel reflex to direct LDL Fasting   Result Value Ref Range    Cholesterol 277 (H) <200 mg/dL    Triglycerides 183 (H) <150 mg/dL    HDL Cholesterol 77 >49 mg/dL    LDL Cholesterol Calculated 163 (H) <100 mg/dL    Non HDL Cholesterol 200 (H) <130 mg/dL   Comprehensive metabolic panel   Result Value Ref Range    Sodium 140 133 - 144 mmol/L    Potassium 3.9 3.4 - 5.3 mmol/L    Chloride 103 94 - 109 mmol/L    Carbon Dioxide 23 20 - 32 mmol/L    Anion Gap 14 3 - 14 mmol/L    Glucose 121 (H) 70 - 99 mg/dL    Urea Nitrogen 15 7 - 30 mg/dL    Creatinine 0.91 0.52 - 1.04 mg/dL    GFR  Estimate 61 >60 mL/min/[1.73_m2]    GFR Estimate If Black 70 >60 mL/min/[1.73_m2]    Calcium 9.7 8.5 - 10.1 mg/dL    Bilirubin Total 0.4 0.2 - 1.3 mg/dL    Albumin 3.9 3.4 - 5.0 g/dL    Protein Total 7.7 6.8 - 8.8 g/dL    Alkaline Phosphatase 95 40 - 150 U/L    ALT 19 0 - 50 U/L    AST 18 0 - 45 U/L   CBC with platelets differential   Result Value Ref Range    WBC 5.8 4.0 - 11.0 10e9/L    RBC Count 5.12 3.8 - 5.2 10e12/L    Hemoglobin 14.2 11.7 - 15.7 g/dL    Hematocrit 43.3 35.0 - 47.0 %    MCV 85 78 - 100 fl    MCH 27.7 26.5 - 33.0 pg    MCHC 32.8 31.5 - 36.5 g/dL    RDW 14.7 10.0 - 15.0 %    Platelet Count 403 150 - 450 10e9/L    % Neutrophils 61.5 %    % Lymphocytes 25.5 %    % Monocytes 10.6 %    % Eosinophils 1.9 %    % Basophils 0.5 %    Absolute Neutrophil 3.6 1.6 - 8.3 10e9/L    Absolute Lymphocytes 1.5 0.8 - 5.3 10e9/L    Absolute Monocytes 0.6 0.0 - 1.3 10e9/L    Absolute Eosinophils 0.1 0.0 - 0.7 10e9/L    Absolute Basophils 0.0 0.0 - 0.2 10e9/L    Diff Method Automated Method                Enclosure: Lab Results

## 2019-08-01 NOTE — PATIENT INSTRUCTIONS
Labs today  Ear wash today  Paperwork filled out today  I refilled your prescriptions  Discontinue Lexapro  Take 1/2 tablet every other day for 1 week  Then take 1/2 tablet every 3rd day for 1 week  Stop Lexapro afterwards  Follow up in 2 months for physical  Seek sooner medical attention if there is any worsening of symptoms or problems

## 2019-08-01 NOTE — NURSING NOTE
Patient identified using two patient identifiers.  Ear exam showing wax occlusion completed by provider.  Solution: warm water was placed in the bilateral ear(s) via irrigation tool: elephant ear.    Collette Soto MA

## 2019-08-01 NOTE — PROGRESS NOTES
Subjective     Olivia Rodriguez is a 74 year old female who presents to clinic today for the following health issues:    Patient was accompanied by her , who helped give her history.    HPI   Forms for Mi   Patient came in today for a check up and to fill out forms  Follows up with Dr. Ledezma for Alzheimer's  Patient Active Problem List   Diagnosis     Major depressive disorder, recurrent episode, moderate (H)     TMJ (temporomandibular joint syndrome)     S/P total knee arthroplasty     S/P HEATHER-BSO (total abdominal hysterectomy and bilateral salpingo-oophorectomy)     Sensation of feeling cold     Health Care Home     Anxiety     Controlled substance agreement signed     Essential hypertension     Coronary artery calcification     Lung nodule     Trigeminal neuralgia     Status post total bilateral knee replacement     Incisional hernia     Advance Care Planning     Dementia without behavioral disturbance, unspecified dementia type     Personal history of malignant neoplasm of breast     Mixed hyperlipidemia     Past Surgical History:   Procedure Laterality Date     BIOPSY  13    right breast biopsy     BIOPSY  13    right axillary lymph node     BREAST SURGERY  13    right breast partial mastectomy/lumpectomy      SECTION       COLONOSCOPY      She had colonoscopy and endoscopy done on November 15, 2012 for workup of iron deficiency anemia and the results were satisfactory     GYN SURGERY           HEAD & NECK SURGERY      surgery for Trigeminal neuralgia       HYSTERECTOMY TOTAL ABDOMINAL, BILATERAL SALPINGO-OOPHORECTOMY, COMBINED       LAPAROSCOPIC HERNIORRHAPHY INCISIONAL N/A 4/10/2017    Procedure: LAPAROSCOPIC HERNIORRHAPHY INCISIONAL;  Surgeon: Dayanara Kendall MD;  Location: [facility]     MASTECTOMY PARTIAL WITH SENTINEL NODE  2013    Procedure: MASTECTOMY PARTIAL WITH SENTINEL NODE;  RIGHT PARTIAL MASTECTOMY WITH RIGHT AXILLARY SENTINEL NODE BIOPSY;  Surgeon:  Kae Padilla MD;  Location: [facility]     ORTHOPEDIC SURGERY      Right TKA and Left TKA       Social History     Tobacco Use     Smoking status: Never Smoker     Smokeless tobacco: Never Used   Substance Use Topics     Alcohol use: Yes     Alcohol/week: 0.6 oz     Types: 1 Standard drinks or equivalent per week     Comment: glass of wine 1 x per wk     Family History   Problem Relation Age of Onset     Cardiovascular Father      Alzheimer Disease Mother      Cancer - colorectal No family hx of      Breast Cancer No family hx of          Current Outpatient Medications   Medication Sig Dispense Refill     amLODIPine (NORVASC) 2.5 MG tablet TAKE 1 TABLET DAILY 90 tablet 1     Aspirin-Acetaminophen-Caffeine (EXCEDRIN PO) Take 1 tablet by mouth every morning (for morning headache)       donepezil (ARICEPT) 10 MG tablet Take 1 tablet (10 mg) by mouth daily 90 tablet 3     Fexofenadine HCl (ALLEGRA PO) Take 1 tablet by mouth daily       hydrochlorothiazide (HYDRODIURIL) 25 MG tablet Take 1 tablet (25 mg) by mouth daily 90 tablet 3     lisinopril (PRINIVIL/ZESTRIL) 40 MG tablet Take 1 tablet (40 mg) by mouth daily 90 tablet 3     pravastatin (PRAVACHOL) 40 MG tablet Take 1 tablet (40 mg) by mouth At Bedtime 90 tablet 3     senna-docusate (SENOKOT-S/PERICOLACE) 8.6-50 MG tablet Take 1 tablet by mouth 2 times daily as needed for constipation       sulfamethoxazole-trimethoprim (BACTRIM DS/SEPTRA DS) 800-160 MG tablet TAKE HALF (1/2) TABLET BY MOUTH DAILY AS NEEDED AFTER INTERCOURSE TO PREVENT UTI 30 tablet 1     venlafaxine (EFFEXOR XR) 150 MG 24 hr capsule Take 2 capsules (300 mg) by mouth daily 180 capsule 2     Recent Labs   Lab Test 11/10/17  1116 10/24/17  1458  03/03/17  1151  02/03/17  1309 12/22/16  1501  10/13/15  1120  09/12/14  0855   A1C  --  5.3  --   --   --   --   --   --   --   --  5.8   LDL  --   --   --   --   --   --  144*  --  155*  --  111   HDL  --   --   --   --   --   --  67  --  92  --  92   TRIG  --   " --   --   --   --   --  230*  --  157*  --  169*   ALT  --  39  --  41  --  55* 29  --  39  --  38   CR 0.83 1.34*   < > 0.83  --  0.88 1.02   < > 0.86  --  0.78   GFRESTIMATED 67 39*   < > 67  --  63 53*  --  65   < > 73   GFRESTBLACK 82 47*   < > 81  --  76 64  --  79   < > 88   POTASSIUM 4.1 5.1   < > 4.2   < > 3.7 3.7  --  4.7  --  4.7   TSH 2.51  --   --  0.72  --   --  1.52  --  2.06  --  2.20    < > = values in this interval not displayed.      Reviewed and updated as needed this visit by Provider         Review of Systems   ROS COMP: Constitutional, HEENT, cardiovascular, pulmonary, GI, , musculoskeletal, neuro, skin, endocrine and psych systems are negative, except as otherwise noted.  Patient follows Dr. Ledezma for her dementia  She lives with her   She is going to be moving to assisted living  She has history of recurrent UTIs to take Bactrim as needed to prevent UTI.  She has history of breast cancer and recently saw her oncologist    This document serves as a record of the services and decisions personally performed and made by Ame Villalobos MD. It was created on her behalf by Miranda Leonard, a trained medical scribe. The creation of this document is based on the provider's statements to the medical scribe.  Miranda Leonard 1:08 PM August 1, 2019          Objective    /78   Pulse 109   Temp 98.7  F (37.1  C) (Oral)   Ht 1.626 m (5' 4\")   Wt 70.6 kg (155 lb 11.2 oz)   LMP 09/01/2000   SpO2 100%   BMI 26.73 kg/m    Body mass index is 26.73 kg/m .  Physical Exam   GENERAL APPEARANCE: healthy, alert and no distress  EYES: Eyes grossly normal to inspection, PERRL and conjunctivae and sclerae normal  HENT: bilateral impacted ceruminosis, nose and mouth without ulcers or lesions  NECK: no adenopathy  RESP: lungs clear to auscultation - no rales, rhonchi or wheezes  CV: regular rates and rhythm, normal S1 S2, no S3  PSYCH: memory is fair    Diagnostic Test Results:  Labs reviewed in " Epic  No results found for this or any previous visit (from the past 24 hour(s)).        Assessment & Plan     Olivia was seen today for forms.    Diagnoses and all orders for this visit:    Dementia without behavioral disturbance, unspecified dementia type  -     donepezil (ARICEPT) 10 MG tablet; Take 1 tablet (10 mg) by mouth daily  Follows up with Dr. Ledezma  Compliant with medication  Patient lives in [place]  Educated patient about difference between full code and DNI/DNR  Patient and  took the class about advanced directives last year  She was full-code  However, they would like to be resuscitated if patient fell or had a minor attack  However, they would like patient to not be on ventilation if she's in a coma  Advised patient to update their advanced directive  Patient's  is her decision maker    Late onset Alzheimer's disease without behavioral disturbance  See above    Mixed hyperlipidemia  -     Lipid panel reflex to direct LDL Fasting    Personal history of malignant neoplasm of breast  Past history  Followed by oncology.  Up-to-date with her mammogram    Essential hypertension  -     hydrochlorothiazide (HYDRODIURIL) 25 MG tablet; Take 1 tablet (25 mg) by mouth daily  -     lisinopril (PRINIVIL/ZESTRIL) 40 MG tablet; Take 1 tablet (40 mg) by mouth daily  -     Comprehensive metabolic panel  Doing well  Compliant with medication    Constipation:  -     senna-docusate (SENOKOT-S/PERICOLACE) 8.6-50 MG tablet; Take 1 tablet by mouth 2 times daily as needed for constipation  Compliant with medication  No complaints    Major depressive disorder, recurrent episode, moderate (H)  Weaned patient off of Lexapro  She will continue to take venlafaxine  Advised follow up in 2 months for physical    Recurrent UTI  -     sulfamethoxazole-trimethoprim (BACTRIM DS/SEPTRA DS) 800-160 MG tablet; TAKE HALF (1/2) TABLET BY MOUTH DAILY AS NEEDED AFTER INTERCOURSE TO PREVENT UTI  Doing well recently  No symptoms  "currently  Compliant with medication    Bilateral impacted cerumen  Ear wash performed in office visit today    Screening for thyroid disorder  -     TSH with free T4 reflex    Screening for deficiency anemia  -     CBC with platelets differential    Other orders  -     PAF COMPLETED  -     pravastatin (PRAVACHOL) 40 MG tablet; Take 1 tablet (40 mg) by mouth At Bedtime    I spent extensive amount of time updating patient's medication list     BMI:   Estimated body mass index is 26.73 kg/m  as calculated from the following:    Height as of this encounter: 1.626 m (5' 4\").    Weight as of this encounter: 70.6 kg (155 lb 11.2 oz).     We also completed the form for polst  Copy sent for scanning and given to the patient has been also    The total visit time was 40 minutes more  than 50% was spent in counseling and coordination of care as discussed above.      Patient Instructions   Labs today  Ear wash today  Paperwork filled out today  I refilled your prescriptions  Discontinue Lexapro  Take 1/2 tablet every other day for 1 week  Then take 1/2 tablet every 3rd day for 1 week  Stop Lexapro afterwards  Follow up in 2 months for physical  Seek sooner medical attention if there is any worsening of symptoms or problems      Return in about 2 months (around 10/1/2019) for Physical Exam.    The information in this document, created by the medical scribe for me, accurately reflects the services I personally performed and the decisions made by me. I have reviewed and approved this document for accuracy prior to leaving the patient care area.  August 1, 2019 1:33 PM    Ame Villalobos MD  [facility]      "

## 2019-08-02 LAB
ALBUMIN SERPL-MCNC: 3.9 G/DL (ref 3.4–5)
ALP SERPL-CCNC: 95 U/L (ref 40–150)
ALT SERPL W P-5'-P-CCNC: 19 U/L (ref 0–50)
ANION GAP SERPL CALCULATED.3IONS-SCNC: 14 MMOL/L (ref 3–14)
AST SERPL W P-5'-P-CCNC: 18 U/L (ref 0–45)
BILIRUB SERPL-MCNC: 0.4 MG/DL (ref 0.2–1.3)
BUN SERPL-MCNC: 15 MG/DL (ref 7–30)
CALCIUM SERPL-MCNC: 9.7 MG/DL (ref 8.5–10.1)
CHLORIDE SERPL-SCNC: 103 MMOL/L (ref 94–109)
CHOLEST SERPL-MCNC: 277 MG/DL
CO2 SERPL-SCNC: 23 MMOL/L (ref 20–32)
CREAT SERPL-MCNC: 0.91 MG/DL (ref 0.52–1.04)
GFR SERPL CREATININE-BSD FRML MDRD: 61 ML/MIN/{1.73_M2}
GLUCOSE SERPL-MCNC: 121 MG/DL (ref 70–99)
HDLC SERPL-MCNC: 77 MG/DL
LDLC SERPL CALC-MCNC: 163 MG/DL
NONHDLC SERPL-MCNC: 200 MG/DL
POTASSIUM SERPL-SCNC: 3.9 MMOL/L (ref 3.4–5.3)
PROT SERPL-MCNC: 7.7 G/DL (ref 6.8–8.8)
SODIUM SERPL-SCNC: 140 MMOL/L (ref 133–144)
TRIGL SERPL-MCNC: 183 MG/DL
TSH SERPL DL<=0.005 MIU/L-ACNC: 1.63 MU/L (ref 0.4–4)

## 2019-08-02 NOTE — RESULT ENCOUNTER NOTE
Gladys Baker,    This is to inform you regarding your test result.    TSH which is thyroid hormone is normal.  The testing of your kidney function, liver function and electrolytes was satisfactory   Your total cholesterol is elevated.  The triglycerides are high. Lowering  the amount of sugar ,alcohol and sweets in the diet helps to control this.Exercise and weight loss helps.  HDL which is called good cholesterol is normal.  Your LDL which is called bad cholesterol is elevated.  Eat low cholesterol low fat  diet and do regular physical activity. Avoid high sugar containing food.  Have you been taking your cholesterol letter lowering medication?  Please let us know  If you would like to see a dietitian regarding this then please let us know so we can put a referral for you to see a dietitian.  CBC result which includes white count Hemoglobin and  Platelet Counts is normal.         Sincerely,      Dr.Nasima Wilfredo MD,FACP

## 2019-08-19 ENCOUNTER — DOCUMENTATION ONLY (OUTPATIENT)
Dept: OTHER | Facility: CLINIC | Age: 74
End: 2019-08-19

## 2019-08-19 PROBLEM — Z71.89 ADVANCED DIRECTIVES, COUNSELING/DISCUSSION: Chronic | Status: RESOLVED | Noted: 2017-12-19 | Resolved: 2019-08-19

## 2019-09-30 NOTE — TELEPHONE ENCOUNTER
"Last Written Prescription Date:  8/1/19  Last Fill Quantity: 90,  # refills: 3   Last office visit: 8/1/2019 with prescribing provider:     Future Office Visit:   Next 5 appointments (look out 90 days)    Oct 02, 2019  1:00 PM CDT  [facility] [address] [facility] [address]  [phone]   Oct 31, 2019 12:30 PM CDT  PHYSICAL with Ame Villalobos MD  [facility] [address] [place] [address]  [phone]         Requested Prescriptions   Pending Prescriptions Disp Refills     lisinopril (PRINIVIL/ZESTRIL) 40 MG tablet [Pharmacy Med Name: LISINOPRIL TABS 40MG] 90 tablet 4     Sig: TAKE 1 TABLET DAILY       ACE Inhibitors (Including Combos) Protocol Passed - 9/28/2019  3:58 PM        Passed - Blood pressure under 140/90 in past 12 months     BP Readings from Last 3 Encounters:   08/01/19 128/78   04/03/19 139/85   10/22/18 140/72                 Passed - Recent (12 mo) or future (30 days) visit within the authorizing provider's specialty     Patient has had an office visit with the authorizing provider or a provider within the authorizing providers department within the previous 12 mos or has a future within next 30 days. See \"Patient Info\" tab in inbasket, or \"Choose Columns\" in Meds & Orders section of the refill encounter.              Passed - Medication is active on med list        Passed - Patient is age 18 or older        Passed - No active pregnancy on record        Passed - Normal serum creatinine on file in past 12 months     Recent Labs   Lab Test 08/01/19  1354  12/05/14  1108   CR 0.91   < >  --    CREAT  --   --  1.1*    < > = values in this interval not displayed.             Passed - Normal serum potassium on file in past 12 months     Recent Labs   Lab Test 08/01/19  1354   POTASSIUM 3.9             Passed - No positive pregnancy test within past 12 months    "

## 2019-10-02 NOTE — PROGRESS NOTES
"MEMORY EVALUATION CLINIC - FOLLOWUP    INTERVAL HISTORY: Olivia is here today with her  Kevin in f/u advanced dementia. In the past few months they moved to [facility] together d/t her advancing dementia. He's noticed that when she is waking up from a late day nap it often leads to sundowner's syndrome. She often wants to \"leave\". In fact last night they walked together outside as she was anxious and looking for \"home\". He walked along with her and eventually guided her home. No violent behaviors. Overall the transition went ok. He has a rubber band deterrent around the door to help prevent wandering and its worked thus far. Kids are involved and helpful. Especially daughter Jeanette who stays with Olivia when Kevin is playing jaeyos. Olivia will start going to the memory care group at [place] a few hours per week soon as a bridge to a possible eventual move to that unit and give Kevin respite. During the day and historically during our visits, he is able to openly talk about her dementia and it doesn't bother her and she has insight into her mother who also had dementia. She is also a retired occupational therapist. She seems to understand during these times that Kevin is doing a lot of work and she apologizes for that but Kevin says, \"I love you and you give me james\". They really are a sweet couple.    She still has some few remaining Xanax from her prior psychiatrist Dr. Sethi. During the event where she walked outside with him in the rain as she was quite worked up, he did give her a dose of the Xanax and it did calm her without any ill effects the next morning. However, I explained I'd prefer her not to take benzodiazepines in the setting of dementia and they understand and are ok with this. She used to be on Lexapro and Effexor and its unclear exactly why but the Lexapro was tapered off a few months ago. Not more depressed but seems slightly more anxious. She is still on Donepezil which initially " "definitely seemed to help her but now that effect has waned.     ADLs: Needs assistance with bathing and dressing  IADLs: Fully dependent  Home situation: [place] apartment at [place] with  Kevin; will start to attend memory care group at [place] soon a few hours per week  Support: Kevin and adult children  Behaviors/Hallucinations/Delusions: Delusion re: apartment not being \"home\" at times    REVIEW OF SYSTEMS: Detailed as above       OBJECTIVE:  /71 (BP Location: Right arm, Cuff Size: Adult Large)   Pulse 80   Temp 97.8  F (36.6  C) (Oral)   Ht 1.626 m (5' 4\")   Wt 74.4 kg (164 lb)   LMP 09/01/2000 (Exact Date)   SpO2 95%   BMI 28.15 kg/m    More unkempt with slight odor  Very repetitive   She is very complimentary of  \"you are so patient\"    COGNITIVE MEDICATIONS: Donepezil and Effexor    PRIOR TESTING: [facility] 8/30 June 2018    ASSESSMENT/PLAN: Olivia is here in follow-up of her dementia, likely of the Alzheimer's type with comorbid long-standing depression as well as family h/o Alzheimer's disease in her mother. Olivia and Kevin have down-sized to [place] and Olivia will start to attend memory care group there a few times per week. Adult children supportive. Discussed sundowner's and redirection techniques. Kevin is quite patient and wanting/willing to still caretake for her. Discussed if anxiety continues to be a concern, would add back her Lexapro as an adjunct to Effexor. Continue Donepezil for now as did have a good response when started initially; if moves into memory care, then consider taper off of Donepezil. Kevin feels he has all the resources he needs now and will plan f/u in the new year or sooner if questions/concerns.      Patient Instructions   If anxiety continues to increase, please plan on restarting Lexapro (talk with Dr. Villalobos or can contact me on rVuehart too if questions)    Try some of the redirection techniques we discussed    Priti to call Kevin for follow up in the new " year        MDM: 30 minutes (1:10 PM-1:40 PM) F2F time spent with patient/ Kevin, over 50% time counseling, coordinating care and explaining about nature of dementia with sundowning and anxiety.    Hilary Ledezma, DO  Internal Medicine and Geriatrics

## 2019-10-02 NOTE — PATIENT INSTRUCTIONS
If anxiety continues to increase, please plan on restarting Lexapro (talk with Dr. Villalobos or can contact me on Netli too if questions)    Try some of the redirection techniques we discussed    Priti to call Kevin for follow up in the new year

## 2019-10-08 NOTE — ADDENDUM NOTE
Addended by: EM GONZALEZ on: 1/23/2019 12:40 PM     Modules accepted: Orders     Chronic maxillary sinusitis    Diabetes mellitus  pre  Dyslipidemia    Essential hypertension    GERD (gastroesophageal reflux disease)    [place] (hard of hearing)  bilat hearing aides  Obstructive sleep apnea  h/o UPPP done 5/2005 pt states never retested & no longer required CPAP mechine  Overactive bladder    Weight loss  60lbs

## 2019-11-01 NOTE — PROGRESS NOTES
The patient was counseled and encouraged to consider modifying their diet and eating habits. She was provided with information on recommended healthy diet options.  The patient reports that she has difficulty with activities of daily living. I have asked that the patient make a follow up appointment in  weeks where this issue will be further evaluated and addressed.  The patient was provided with written information regarding signs of hearing loss.

## 2019-11-01 NOTE — PROGRESS NOTES
"SUBJECTIVE:   Olivia Rodriguez is a 74 year old female who presents for Preventive Visit.  Are you in the first 12 months of your Medicare coverage?  No    She came with her =    Healthy Habits:     In general, how would you rate your overall health?  Good    Frequency of exercise:  4-5 days/week    Duration of exercise:  15-30 minutes    Do you usually eat at least 4 servings of fruit and vegetables a day, include whole grains    & fiber and avoid regularly eating high fat or \"junk\" foods?  No    Taking medications regularly:  Yes    Medication side effects:  None    Ability to successfully perform activities of daily living:  Transportation requires assistance, shopping requires assistance, preparing meals requires assistance, bathing requires assistance, medication administration requires assistance and money management requires assistance    Home Safety:  No safety concerns identified    Hearing Impairment:  Difficulty understanding soft or whispered speech    In the past 6 months, have you been bothered by leaking of urine?  No    In general, how would you rate your overall mental or emotional health?  Good      PHQ-2 Total Score: 0    Additional concerns today:  No    Do you feel safe in your environment? Yes    Have you ever done Advance Care Planning? (For example, a Health Directive, POLST, or a discussion with a medical provider about your wishes): Yes, advance care planning is on file.      Fall risk       Cognitive Screening   1) Repeat 3 items (Leader, Season, Table)    2) Clock draw: was not done due to dementia  3) 3 item recall: Recalls 1 object   Results: *    Mini-CogTM Copyright BRIDGETT [employer]. Licensed by the author for use in [facility]; reprinted with permission (kaley@.[facility]). All rights reserved.      Do you have sleep apnea, excessive snoring or daytime drowsiness?: no    Reviewed and updated as needed this visit by clinical staff         Reviewed and updated as needed this " visit by Provider        Social History     Tobacco Use     Smoking status: Never Smoker     Smokeless tobacco: Never Used   Substance Use Topics     Alcohol use: Yes     Alcohol/week: 1.0 standard drinks     Types: 1 Standard drinks or equivalent per week     Comment: glass of wine 1 x per wk     If you drink alcohol do you typically have >3 drinks per day or >7 drinks per week? No    Alcohol Use 10/28/2019   Prescreen: >3 drinks/day or >7 drinks/week? No   Prescreen: >3 drinks/day or >7 drinks/week? -       Current providers sharing in care for this patient include:   Patient Care Team:  Ame Villalobos MD as PCP - General (Internal Medicine)  Hilary Ledezma DO as Assigned PCP    The following health maintenance items are reviewed in Epic and correct as of today:  Health Maintenance   Topic Date Due     MEDICARE ANNUAL WELLNESS VISIT  09/28/2019     PHQ-9  04/28/2020     FALL RISK ASSESSMENT  10/02/2020     COLONOSCOPY  11/15/2022     LIPID  08/01/2024     ADVANCE CARE PLANNING  08/19/2024     DTAP/TDAP/TD IMMUNIZATION (3 - Td) 10/13/2025     DEXA  Completed     DEPRESSION ACTION PLAN  Completed     INFLUENZA VACCINE  Completed     PNEUMOCOCCAL IMMUNIZATION 65+ HIGH/HIGHEST RISK  Completed     ZOSTER IMMUNIZATION  Completed     IPV IMMUNIZATION  Aged Out     MENINGITIS IMMUNIZATION  Aged Out     Labs reviewed in EPIC  Patient Active Problem List   Diagnosis     Major depressive disorder, recurrent episode, moderate (H)     TMJ (temporomandibular joint syndrome)     S/P total knee arthroplasty     S/P HEATHER-BSO (total abdominal hysterectomy and bilateral salpingo-oophorectomy)     Sensation of feeling cold     Health Care Home     Anxiety     Controlled substance agreement signed     Essential hypertension     Coronary artery calcification     Lung nodule     Trigeminal neuralgia     Status post total bilateral knee replacement     Incisional hernia     Dementia without behavioral disturbance, unspecified  dementia type (H)     Personal history of malignant neoplasm of breast     Mixed hyperlipidemia     Past Surgical History:   Procedure Laterality Date     APPENDECTOMY      during c section     BIOPSY  13    right breast biopsy     BIOPSY  13    right axillary lymph node     BREAST SURGERY  13    right breast partial mastectomy/lumpectomy      SECTION       COLONOSCOPY      She had colonoscopy and endoscopy done on November 15, 2012 for workup of iron deficiency anemia and the results were satisfactory     EYE SURGERY  2014    cataracts     GYN SURGERY           HEAD & NECK SURGERY      surgery for Trigeminal neuralgia       HYSTERECTOMY TOTAL ABDOMINAL, BILATERAL SALPINGO-OOPHORECTOMY, COMBINED       LAPAROSCOPIC HERNIORRHAPHY INCISIONAL N/A 4/10/2017    Procedure: LAPAROSCOPIC HERNIORRHAPHY INCISIONAL;  Surgeon: Dayanara Kendall MD;  Location: [facility]     MASTECTOMY PARTIAL WITH SENTINEL NODE  2013    Procedure: MASTECTOMY PARTIAL WITH SENTINEL NODE;  RIGHT PARTIAL MASTECTOMY WITH RIGHT AXILLARY SENTINEL NODE BIOPSY;  Surgeon: Kae Padilla MD;  Location: [facility]     ORTHOPEDIC SURGERY      Right TKA and Left TKA       Social History     Tobacco Use     Smoking status: Never Smoker     Smokeless tobacco: Never Used   Substance Use Topics     Alcohol use: Yes     Alcohol/week: 1.0 standard drinks     Comment: 1 or 2 glasses of wine per week     Family History   Problem Relation Age of Onset     Cardiovascular Father      Hypertension Father      Hyperlipidemia Father      Alzheimer Disease Mother      Depression Mother      Genetic Disorder Mother         Alzheimer's symptoms     Cancer - colorectal No family hx of      Breast Cancer No family hx of          Current Outpatient Medications   Medication Sig Dispense Refill     amLODIPine (NORVASC) 2.5 MG tablet TAKE 1 TABLET DAILY 90 tablet 1     Aspirin-Acetaminophen-Caffeine (EXCEDRIN PO) Take 1 tablet by mouth every  "morning (for morning headache)       donepezil (ARICEPT) 10 MG tablet Take 1 tablet (10 mg) by mouth daily 90 tablet 3     Fexofenadine HCl (ALLEGRA PO) Take 1 tablet by mouth daily       hydrochlorothiazide (HYDRODIURIL) 25 MG tablet Take 1 tablet (25 mg) by mouth daily 90 tablet 3     lisinopril (PRINIVIL/ZESTRIL) 40 MG tablet TAKE 1 TABLET DAILY 90 tablet 0     pravastatin (PRAVACHOL) 40 MG tablet Take 1 tablet (40 mg) by mouth At Bedtime 90 tablet 3     senna-docusate (SENOKOT-S/PERICOLACE) 8.6-50 MG tablet Take 1 tablet by mouth 2 times daily as needed for constipation       sulfamethoxazole-trimethoprim (BACTRIM DS/SEPTRA DS) 800-160 MG tablet TAKE HALF (1/2) TABLET BY MOUTH DAILY AS NEEDED AFTER INTERCOURSE TO PREVENT UTI 30 tablet 1     venlafaxine (EFFEXOR XR) 150 MG 24 hr capsule Take 2 capsules (300 mg) by mouth daily 180 capsule 2     Allergies   Allergen Reactions     Seafood Anaphylaxis     Femara [Letrozole] Rash     Latex Rash     WITH LATEX BANDAIDS     Tamoxifen Rash     Review of Systems  Constitutional, HEENT, cardiovascular, pulmonary, GI, , musculoskeletal, neuro, skin, endocrine and psych systems are negative, except as otherwise noted.    This document serves as a record of the services and decisions personally performed and made by Ame Villalobos MD. It was created on her behalf by Betty Diaz, a trained medical scribe. The creation of this document is based on the provider's statements to the medical scribe.  Betty Diaz 10:28 AM November 1, 2019    OBJECTIVE:   BP (!) 151/90 (BP Location: Right arm, Patient Position: Chair, Cuff Size: Adult Large)   Pulse 76   Temp 97  F (36.1  C) (Oral)   Ht 1.626 m (5' 4\")   Wt 74.4 kg (164 lb)   LMP 09/01/2000 (Exact Date)   SpO2 98%   Breastfeeding? No   BMI 28.15 kg/m   Estimated body mass index is 28.15 kg/m  as calculated from the following:    Height as of this encounter: 1.626 m (5' 4\").    Weight as of this encounter: 74.4 kg (164 lb).  Physical " Exam  GENERAL APPEARANCE: healthy, alert and no distress  EYES: Eyes grossly normal to inspection, PERRL and conjunctivae and sclerae normal  HENT: ear canals and TM's normal, nose and mouth without ulcers or lesions, oropharynx clear and oral mucous membranes moist  NECK: no adenopathy,   RESP: lungs clear to auscultation - no rales, rhonchi or wheezes  BREAST: normal without masses, tenderness or nipple discharge and no palpable axillary masses or adenopathy  CV: regular rate and rhythm, normal S1 S2, no S3 or S4, no murmur, click or rub, she has  peripheral edema and peripheral pulses strong  ABDOMEN: soft, nontender, no hepatosplenomegaly, no masses and bowel sounds normal  MS: prominent veins, slight edema of bilateral legs, gait is age appropriate without ataxia  SKIN: no suspicious lesions or rashes  PSYCH: she has memory impairment.      Diagnostic Test Results:  Labs reviewed in Epic  No results found for this or any previous visit (from the past 24 hour(s)).    Reviewed and discussed lipids done on 08/01/2019  Reviewed and discussed labs done on 08/01/2019  ASSESSMENT / PLAN:   Olivia was seen today for physical.    Diagnoses and all orders for this visit:    Encounter for Medicare annual wellness exam  Patient is accompanied by , who helped provide her medical information  Patient is here for wellness visit  Up to date on colonoscopy  Up to date on immunizations  Up to date on mammogram  Information about hearing loss provided in office today    Dementia without behavioral disturbance, unspecified dementia type (H)  Follows with Dr. Ledezma  Compliant with medication  Lives in [place]  Patient's  is her decision maker  She has advanced directives  Had discussion about POLST forms  Her forms are invalid  She will need to do new one  POLST forms were completed in office today  They would like to be resuscitated and have full treatment  Will fax copy of POLST to [place]  -     donepezil (ARICEPT)  "10 MG tablet; Take 1 tablet (10 mg) by mouth daily For memory    Essential hypertension  BP elevated today (151/90)  Does not check BP at home  BP rechecked in office and was 138/76  Explained home BP readings are more reliable  Monitor your blood pressure once a week at home.  Bring those readings on your next visit.  Notify us if your blood pressure readings consistently stays greater than 140/90.  -     amLODIPine (NORVASC) 2.5 MG tablet; Take 1 tablet (2.5 mg) by mouth daily For Blood Pressure  -     lisinopril (PRINIVIL/ZESTRIL) 40 MG tablet; Take 1 tablet (40 mg) by mouth daily For high Blood Pressure  -     hydrochlorothiazide (HYDRODIURIL) 25 MG tablet; Take 1 tablet (25 mg) by mouth daily For Blood Pressure    Hyperlipidemia LDL goal <100  Doing well  Taking pravastatin consistently  Last labs done showed lipids were elevated  Will recheck labs today  -     pravastatin (PRAVACHOL) 40 MG tablet; Take 1 tablet (40 mg) by mouth At Bedtime For high cholestrol  -     Lipid panel reflex to direct LDL Non-fasting    POLST was done again as there was mistake last time   So new one done she has chosen to be full code   She and her  said she needs to be full code  If she does not recover then  can make decision for taking care of her.    COUNSELING:  Reviewed preventive health counseling, as reflected in patient instructions       Regular exercise       Healthy diet/nutrition       Advanced Planning     Estimated body mass index is 28.15 kg/m  as calculated from the following:    Height as of this encounter: 1.626 m (5' 4\").    Weight as of this encounter: 74.4 kg (164 lb).     reports that she has never smoked. She has never used smokeless tobacco.    Appropriate preventive services were discussed with this patient, including applicable screening as appropriate for cardiovascular disease, diabetes, osteopenia/osteoporosis, and glaucoma.  As appropriate for age/gender, discussed screening for colorectal " cancer, prostate cancer, breast cancer, and cervical cancer. Checklist reviewing preventive services available has been given to the patient.    Reviewed patients plan of care and provided an AVS. The Basic Care Plan (routine screening as documented in Health Maintenance) for Olivia meets the Care Plan requirement. This Care Plan has been established and reviewed with the Patient and spouse.    Patient Instructions     Labs today  I refilled your prescriptions  Follow-up in 6 months  Seek sooner medical attention if there is any worsening of symptoms or problems    Counseling Resources:  ATP IV Guidelines  Pooled Cohorts Equation Calculator  Breast Cancer Risk Calculator  FRAX Risk Assessment  ICSI Preventive Guidelines  Dietary Guidelines for Americans, 2010  USDA's MyPlate  ASA Prophylaxis  Lung CA Screening    The information in this document, created by the medical scribe for me, accurately reflects the services I personally performed and the decisions made by me. I have reviewed and approved this document for accuracy prior to leaving the patient care area.  November 1, 2019 10:52 AM    Ame Villalobos MD  [facility]    Identified Health Risks:

## 2019-11-01 NOTE — PATIENT INSTRUCTIONS
Labs today  I refilled your prescriptions  Follow-up in 6 months  Seek sooner medical attention if there is any worsening of symptoms or problems    Patient Education   Personalized Prevention Plan  You are due for the preventive services outlined below.  Your care team is available to assist you in scheduling these services.  If you have already completed any of these items, please share that information with your care team to update in your medical record.  Health Maintenance Due   Topic Date Due     Annual Wellness Visit  09/28/2019     Preventive Health Recommendations    See your health care provider every year to    Review health changes.     Discuss preventive care.      Review your medicines if your doctor has prescribed any.    You no longer need a yearly Pap test unless you've had an abnormal Pap test in the past 10 years. If you have vaginal symptoms, such as bleeding or discharge, be sure to talk with your provider about a Pap test.    Every 1 to 2 years, have a mammogram.  If you are over 69, talk with your health care provider about whether or not you want to continue having screening mammograms.    Every 10 years, have a colonoscopy. Or, have a yearly FIT test (stool test). These exams will check for colon cancer.     Have a cholesterol test every 5 years, or more often if your doctor advises it.     Have a diabetes test (fasting glucose) every three years. If you are at risk for diabetes, you should have this test more often.     At age 65, have a bone density scan (DEXA) to check for osteoporosis (brittle bone disease).    Shots:    Get a flu shot each year.    Get a tetanus shot every 10 years.    Talk to your doctor about your pneumonia vaccines. There are now two you should receive - Pneumovax (PPSV 23) and Prevnar (PCV 13).    Talk to your pharmacist about the shingles vaccine.    Talk to your doctor about the hepatitis B vaccine.    Nutrition:     Eat at least 5 servings of fruits and  vegetables each day.    Eat whole-grain bread, whole-wheat pasta and brown rice instead of white grains and rice.    Get adequate Calcium and Vitamin D.     Lifestyle    Exercise at least 150 minutes a week (30 minutes a day, 5 days a week). This will help you control your weight and prevent disease.    Limit alcohol to one drink per day.    No smoking.     Wear sunscreen to prevent skin cancer.     See your dentist twice a year for an exam and cleaning.    See your eye doctor every 1 to 2 years to screen for conditions such as glaucoma, macular degeneration and cataracts.    Personalized Prevention Plan  You are due for the preventive services outlined below.  Your care team is available to assist you in scheduling these services.  If you have already completed any of these items, please share that information with your care team to update in your medical record.  Health Maintenance Due   Topic Date Due     Annual Wellness Visit  09/28/2019       Understanding [employer] MyPlate  The USDA (U.S. Department of Agriculture) has guidelines to help you make healthy food choices. These are called MyPlate. MyPlate shows the food groups that make up healthy meals using the image of a place setting. Before you eat, think about the healthiest choices for what to put onto your plate or into your cup or bowl. To learn more about building a healthy plate, visit www.choosemyplate.gov.    The food groups    Fruits. Any fruit or 100% fruit juice counts as part of the Fruit Group. Fruits may be fresh, canned, frozen, or dried, and may be whole, cut-up, or pureed. Make half your plate fruits and vegetables.    Vegetables. Any vegetable or 100% vegetable juice counts as a member of the Vegetable Group. Vegetables may be fresh, frozen, canned, or dried. They can be served raw or cooked and may be whole, cut-up, or mashed. Make half your plate fruits and vegetables.    Grains. All foods made from grains are part of the Grains Group. These  include wheat, rice, oats, cornmeal, and barley such as bread, pasta, oatmeal, cereal, tortillas, and grits. Grains should be no more than a quarter of your plate. At least half of your grains should be whole grains.    Protein. This group includes meat, poultry, seafood, beans and peas, eggs, processed soy products (like tofu), nuts (including nut butters), and seeds. Make protein choices no more than a quarter of your plate. Meat and poultry choices should be lean or low fat.    Dairy. All fluid milk products and foods made from milk that contain calcium, like yogurt and cheese, are part of the Dairy Group. (Foods that have little calcium, such as cream, butter, and cream cheese, are not part of the group.) Most dairy choices should be low-fat or fat-free.    Oils. These are fats that are liquid at room temperature. They include canola, corn, olive, soybean, and sunflower oil. Foods that are mainly oil include mayonnaise, certain salad dressings, and soft margarines. You should have only 5 to 7 teaspoons of oils a day. You probably already get this much from the food you eat.  Date Last Reviewed: 8/1/2017 2000-2018 [employer]. [address] [place] [address]. All rights reserved. This information is not intended as a substitute for professional medical care. Always follow your healthcare professional's instructions.        Activities of Daily Living    Your Health Risk Assessment indicates you have difficulties with activities of daily living such as housework, bathing, preparing meals, taking medication, etc. Please make a follow up appointment for us to address this issue in more detail.    Signs of Hearing Loss     Hearing much better with one ear can be a sign of hearing loss.     Hearing loss is a problem shared by many people. In fact, it is one of the most common health conditions, particularly as people age. Most people over age 65 have some hearing loss, and by age 80, almost  everyone does. Because hearing loss usually occurs slowly over the years, you may not realize your hearing ability has gotten worse.  Have your hearing checked  Contact your healthcare provider if you:    Have to strain to hear normal conversation    Have to watch other people s faces very carefully to follow what they re saying    Need to ask people to repeat what they ve said    Often misunderstand what people are saying    Turn the volume of the television or radio up so high that others complain    Feel that people are mumbling when they re talking to you    Find that the effort to hear leaves you feeling tired and irritated    Notice, when using the phone, that you hear better with one ear than the other  Date Last Reviewed: 12/1/2016 2000-2018 [employer]. [address] [place] [address], [place] [address]. All rights reserved. This information is not intended as a substitute for professional medical care. Always follow your healthcare professional's instructions.

## 2019-12-05 NOTE — PROGRESS NOTES
SUBJECTIVE:  Olivia Rodriguez, a 74 year old female scheduled an appointment to discuss the following issues:  Abdominal pain, generalized   Pt with some worsening abdominal pain  Fewer bowel movement, also harder  No dysuria  Eating less  No nausea or vomiting    Medical, social, surgical, and family histories reviewed.    ROS:  CONSTITUTIONAL: NEGATIVE for fever, chills  EYES: NEGATIVE for vision changes   RESP: NEGATIVE for significant cough or SOB  CV: NEGATIVE for chest pain, palpitations   GI: NEGATIVE for nausea, heartburn  : NEGATIVE for frequency, dysuria, or hematuria  MUSCULOSKELETAL: NEGATIVE for significant arthralgias or myalgia  NEURO: NEGATIVE for weakness, dizziness or paresthesias or headache     OBJECTIVE:  /80   Pulse 95   Temp 97.6  F (36.4  C) (Oral)   Wt 70.8 kg (156 lb)   LMP 09/01/2000 (Exact Date)   SpO2 98%   BMI 26.78 kg/m    EXAM:  GENERAL APPEARANCE: healthy, alert and no distress  EYES: EOMI,  PERRL  HENT: ear canals and TM's normal and nose and mouth without ulcers or lesions  RESP: lungs clear to auscultation - no rales, rhonchi or wheezes  CV: regular rates and rhythm, normal S1 S2, no S3 or S4 and no murmur, click or rub -  ABDOMEN:  soft, nontender, no HSM or masses and bowel sounds normal    XRAY abdomen - 1. Degenerative changes throughout the lumbar spine are partially  imaged.  2. No evidence for bowel obstruction.  3. Moderate amount of stool in the colon.  4. Etiology for patient's symptoms is not definitely seen.     ASSESSMENT/PLAN:  (R10.84) Abdominal pain, generalized  (primary encounter diagnosis)  Plan: XR Abdomen 2 Views, CBC with platelets and         differential, Comprehensive metabolic panel, UA        reflex to Microscopic and Culture, CRP,         inflammation, Lipase, Urine Microscopic, Urine         Culture Aerobic Bacterial        Suspect constipation, advise miralax 17 g 1-2 times daily  Follow up if not improving.  Decreased intake may also be  a sign of dementia disease progression  Advise close follow up with primary care    25 minutes spent with patient, over 50% time counseling, coordinating care and explaining about nature of the patient's conditions.  All risks, benefits of treatment and further evaluation was reviewed with patient.  Pt expressed understanding.  Pt was in agreement with this plan.  Edilberto Harris MD

## 2019-12-05 NOTE — PATIENT INSTRUCTIONS
R10.84) Abdominal pain, generalized  (primary encounter diagnosis)  Plan: XR Abdomen 2 Views, CBC with platelets and         differential, Comprehensive metabolic panel, UA        reflex to Microscopic and Culture, CRP,         inflammation, Lipase, Urine Microscopic, Urine         Culture Aerobic Bacterial        Suspect constipation, advise miralax 17 g 1-2 times daily  Follow up if not improving.  Decreased intake may also be a sign of dementia disease progression  Advise close follow up with primary care

## 2020-01-01 ENCOUNTER — APPOINTMENT (OUTPATIENT)
Dept: CT IMAGING | Facility: CLINIC | Age: 75
DRG: 394 | End: 2020-01-01
Attending: HOSPITALIST
Payer: COMMERCIAL

## 2020-01-01 ENCOUNTER — TRANSFERRED RECORDS (OUTPATIENT)
Dept: HEALTH INFORMATION MANAGEMENT | Facility: CLINIC | Age: 75
End: 2020-01-01

## 2020-01-01 ENCOUNTER — TELEPHONE (OUTPATIENT)
Dept: FAMILY MEDICINE | Facility: CLINIC | Age: 75
End: 2020-01-01

## 2020-01-01 ENCOUNTER — DOCUMENTATION ONLY (OUTPATIENT)
Dept: CARE COORDINATION | Facility: CLINIC | Age: 75
End: 2020-01-01

## 2020-01-01 ENCOUNTER — MEDICAL CORRESPONDENCE (OUTPATIENT)
Dept: HEALTH INFORMATION MANAGEMENT | Facility: CLINIC | Age: 75
End: 2020-01-01

## 2020-01-01 ENCOUNTER — HOSPITAL ENCOUNTER (OUTPATIENT)
Dept: CT IMAGING | Facility: CLINIC | Age: 75
Discharge: HOME | End: 2020-01-24
Attending: NURSE PRACTITIONER | Admitting: NURSE PRACTITIONER
Payer: COMMERCIAL

## 2020-01-01 ENCOUNTER — OFFICE VISIT (OUTPATIENT)
Dept: FAMILY MEDICINE | Facility: CLINIC | Age: 75
End: 2020-01-01
Payer: COMMERCIAL

## 2020-01-01 ENCOUNTER — HOSPITAL ENCOUNTER (OUTPATIENT)
Facility: CLINIC | Age: 75
Discharge: HOME | End: 2020-07-23
Attending: INTERNAL MEDICINE | Admitting: INTERNAL MEDICINE
Payer: COMMERCIAL

## 2020-01-01 ENCOUNTER — TELEPHONE (OUTPATIENT)
Dept: NEUROSURGERY | Facility: CLINIC | Age: 75
End: 2020-01-01

## 2020-01-01 ENCOUNTER — APPOINTMENT (OUTPATIENT)
Dept: CT IMAGING | Facility: CLINIC | Age: 75
End: 2020-01-01
Attending: EMERGENCY MEDICINE
Payer: COMMERCIAL

## 2020-01-01 ENCOUNTER — MYC MEDICAL ADVICE (OUTPATIENT)
Dept: FAMILY MEDICINE | Facility: CLINIC | Age: 75
End: 2020-01-01

## 2020-01-01 ENCOUNTER — OFFICE VISIT (OUTPATIENT)
Dept: NEUROSURGERY | Facility: CLINIC | Age: 75
End: 2020-01-01
Payer: COMMERCIAL

## 2020-01-01 ENCOUNTER — APPOINTMENT (OUTPATIENT)
Dept: GENERAL RADIOLOGY | Facility: CLINIC | Age: 75
DRG: 394 | End: 2020-01-01
Attending: HOSPITALIST
Payer: COMMERCIAL

## 2020-01-01 ENCOUNTER — VIRTUAL VISIT (OUTPATIENT)
Dept: FAMILY MEDICINE | Facility: CLINIC | Age: 75
End: 2020-01-01
Payer: COMMERCIAL

## 2020-01-01 ENCOUNTER — APPOINTMENT (OUTPATIENT)
Dept: PHYSICAL THERAPY | Facility: CLINIC | Age: 75
DRG: 394 | End: 2020-01-01
Payer: COMMERCIAL

## 2020-01-01 ENCOUNTER — DOCUMENTATION ONLY (OUTPATIENT)
Dept: FAMILY MEDICINE | Facility: CLINIC | Age: 75
End: 2020-01-01

## 2020-01-01 ENCOUNTER — HOSPITAL ENCOUNTER (OUTPATIENT)
Dept: MAMMOGRAPHY | Facility: CLINIC | Age: 75
Discharge: HOME | End: 2020-02-06
Attending: INTERNAL MEDICINE | Admitting: INTERNAL MEDICINE
Payer: COMMERCIAL

## 2020-01-01 ENCOUNTER — HOSPITAL ENCOUNTER (OUTPATIENT)
Facility: CLINIC | Age: 75
End: 2020-01-01
Payer: COMMERCIAL

## 2020-01-01 ENCOUNTER — APPOINTMENT (OUTPATIENT)
Dept: PHYSICAL THERAPY | Facility: CLINIC | Age: 75
DRG: 394 | End: 2020-01-01
Attending: HOSPITALIST
Payer: COMMERCIAL

## 2020-01-01 ENCOUNTER — HOSPITAL ENCOUNTER (INPATIENT)
Facility: CLINIC | Age: 75
LOS: 4 days | Discharge: HOME HEALTH | DRG: 394 | End: 2020-06-20
Attending: EMERGENCY MEDICINE | Admitting: HOSPITALIST
Payer: COMMERCIAL

## 2020-01-01 ENCOUNTER — HOSPITAL ENCOUNTER (EMERGENCY)
Facility: CLINIC | Age: 75
Discharge: HOME | End: 2020-02-10
Attending: EMERGENCY MEDICINE | Admitting: EMERGENCY MEDICINE
Payer: COMMERCIAL

## 2020-01-01 VITALS
OXYGEN SATURATION: 96 % | SYSTOLIC BLOOD PRESSURE: 82 MMHG | BODY MASS INDEX: 24.36 KG/M2 | HEART RATE: 101 BPM | DIASTOLIC BLOOD PRESSURE: 57 MMHG | WEIGHT: 141.9 LBS

## 2020-01-01 VITALS
OXYGEN SATURATION: 96 % | DIASTOLIC BLOOD PRESSURE: 65 MMHG | BODY MASS INDEX: 24.91 KG/M2 | SYSTOLIC BLOOD PRESSURE: 100 MMHG | WEIGHT: 145.1 LBS | HEART RATE: 100 BPM

## 2020-01-01 VITALS
TEMPERATURE: 98.6 F | WEIGHT: 142 LBS | HEART RATE: 114 BPM | HEIGHT: 64 IN | DIASTOLIC BLOOD PRESSURE: 82 MMHG | BODY MASS INDEX: 24.24 KG/M2 | SYSTOLIC BLOOD PRESSURE: 127 MMHG | OXYGEN SATURATION: 97 %

## 2020-01-01 VITALS
HEIGHT: 64 IN | BODY MASS INDEX: 21.11 KG/M2 | SYSTOLIC BLOOD PRESSURE: 144 MMHG | RESPIRATION RATE: 20 BRPM | OXYGEN SATURATION: 95 % | DIASTOLIC BLOOD PRESSURE: 91 MMHG | HEART RATE: 82 BPM

## 2020-01-01 VITALS
HEIGHT: 64 IN | BODY MASS INDEX: 21.89 KG/M2 | WEIGHT: 128.2 LBS | HEART RATE: 96 BPM | OXYGEN SATURATION: 99 % | TEMPERATURE: 96.8 F

## 2020-01-01 VITALS
HEART RATE: 98 BPM | TEMPERATURE: 98.3 F | BODY MASS INDEX: 21.11 KG/M2 | OXYGEN SATURATION: 94 % | RESPIRATION RATE: 16 BRPM | WEIGHT: 123 LBS | DIASTOLIC BLOOD PRESSURE: 89 MMHG | SYSTOLIC BLOOD PRESSURE: 151 MMHG

## 2020-01-01 VITALS
OXYGEN SATURATION: 97 % | DIASTOLIC BLOOD PRESSURE: 63 MMHG | TEMPERATURE: 98.8 F | BODY MASS INDEX: 22.36 KG/M2 | WEIGHT: 131 LBS | SYSTOLIC BLOOD PRESSURE: 93 MMHG | HEART RATE: 101 BPM | HEIGHT: 64 IN

## 2020-01-01 VITALS
HEART RATE: 98 BPM | TEMPERATURE: 98.7 F | WEIGHT: 139.9 LBS | BODY MASS INDEX: 23.88 KG/M2 | HEIGHT: 64 IN | DIASTOLIC BLOOD PRESSURE: 70 MMHG | SYSTOLIC BLOOD PRESSURE: 103 MMHG | OXYGEN SATURATION: 98 %

## 2020-01-01 VITALS
DIASTOLIC BLOOD PRESSURE: 74 MMHG | RESPIRATION RATE: 14 BRPM | OXYGEN SATURATION: 97 % | SYSTOLIC BLOOD PRESSURE: 114 MMHG | TEMPERATURE: 98.4 F | HEART RATE: 113 BPM

## 2020-01-01 DIAGNOSIS — F03.90 DEMENTIA WITHOUT BEHAVIORAL DISTURBANCE, UNSPECIFIED DEMENTIA TYPE: Chronic | ICD-10-CM

## 2020-01-01 DIAGNOSIS — R63.4 WEIGHT LOSS: Primary | ICD-10-CM

## 2020-01-01 DIAGNOSIS — Z13.0 SCREENING FOR DEFICIENCY ANEMIA: ICD-10-CM

## 2020-01-01 DIAGNOSIS — R10.84 ABDOMINAL PAIN, GENERALIZED: ICD-10-CM

## 2020-01-01 DIAGNOSIS — F33.1 MAJOR DEPRESSIVE DISORDER, RECURRENT EPISODE, MODERATE (H): Primary | ICD-10-CM

## 2020-01-01 DIAGNOSIS — L98.9 FACIAL SKIN LESION: ICD-10-CM

## 2020-01-01 DIAGNOSIS — F02.818 LATE ONSET ALZHEIMER'S DISEASE WITH BEHAVIORAL DISTURBANCE (H): ICD-10-CM

## 2020-01-01 DIAGNOSIS — R63.4 WEIGHT LOSS: ICD-10-CM

## 2020-01-01 DIAGNOSIS — Z11.59 ENCOUNTER FOR SCREENING FOR OTHER VIRAL DISEASES: ICD-10-CM

## 2020-01-01 DIAGNOSIS — G50.0 TRIGEMINAL NEURALGIA: ICD-10-CM

## 2020-01-01 DIAGNOSIS — E78.5 HYPERLIPIDEMIA LDL GOAL <100: ICD-10-CM

## 2020-01-01 DIAGNOSIS — B99.9 INTRA-ABDOMINAL INFECTION: Primary | ICD-10-CM

## 2020-01-01 DIAGNOSIS — F33.1 MAJOR DEPRESSIVE DISORDER, RECURRENT EPISODE, MODERATE (H): ICD-10-CM

## 2020-01-01 DIAGNOSIS — K21.9 GASTROESOPHAGEAL REFLUX DISEASE WITHOUT ESOPHAGITIS: ICD-10-CM

## 2020-01-01 DIAGNOSIS — Z12.31 SCREENING MAMMOGRAM, ENCOUNTER FOR: ICD-10-CM

## 2020-01-01 DIAGNOSIS — F41.9 ANXIETY: ICD-10-CM

## 2020-01-01 DIAGNOSIS — R19.7 DIARRHEA, UNSPECIFIED TYPE: ICD-10-CM

## 2020-01-01 DIAGNOSIS — F03.90 DEMENTIA WITHOUT BEHAVIORAL DISTURBANCE, UNSPECIFIED DEMENTIA TYPE: ICD-10-CM

## 2020-01-01 DIAGNOSIS — N18.30 CKD (CHRONIC KIDNEY DISEASE) STAGE 3, GFR 30-59 ML/MIN (H): ICD-10-CM

## 2020-01-01 DIAGNOSIS — G30.1 LATE ONSET ALZHEIMER'S DISEASE WITH BEHAVIORAL DISTURBANCE (H): ICD-10-CM

## 2020-01-01 DIAGNOSIS — Z01.818 PREOP GENERAL PHYSICAL EXAM: Primary | ICD-10-CM

## 2020-01-01 DIAGNOSIS — F03.90 DEMENTIA WITHOUT BEHAVIORAL DISTURBANCE, UNSPECIFIED DEMENTIA TYPE: Primary | Chronic | ICD-10-CM

## 2020-01-01 DIAGNOSIS — I10 ESSENTIAL HYPERTENSION: ICD-10-CM

## 2020-01-01 DIAGNOSIS — I95.2 HYPOTENSION DUE TO DRUGS: ICD-10-CM

## 2020-01-01 DIAGNOSIS — K52.9 COLITIS: ICD-10-CM

## 2020-01-01 DIAGNOSIS — K55.9 ISCHEMIC COLITIS (H): ICD-10-CM

## 2020-01-01 DIAGNOSIS — G50.0 TRIGEMINAL NEURALGIA: Primary | ICD-10-CM

## 2020-01-01 DIAGNOSIS — R10.13 DYSPEPSIA: ICD-10-CM

## 2020-01-01 DIAGNOSIS — R45.1 AGITATION: Primary | ICD-10-CM

## 2020-01-01 DIAGNOSIS — F03.90 DEMENTIA WITHOUT BEHAVIORAL DISTURBANCE, UNSPECIFIED DEMENTIA TYPE: Primary | ICD-10-CM

## 2020-01-01 DIAGNOSIS — E86.0 DEHYDRATION: ICD-10-CM

## 2020-01-01 DIAGNOSIS — Z13.29 SCREENING FOR THYROID DISORDER: ICD-10-CM

## 2020-01-01 DIAGNOSIS — K92.1 HEMATOCHEZIA: ICD-10-CM

## 2020-01-01 DIAGNOSIS — R63.0 POOR APPETITE: ICD-10-CM

## 2020-01-01 DIAGNOSIS — Z85.828 HISTORY OF SKIN CANCER: ICD-10-CM

## 2020-01-01 DIAGNOSIS — K55.9 ISCHEMIC COLITIS (H): Primary | ICD-10-CM

## 2020-01-01 DIAGNOSIS — Z11.59 ENCOUNTER FOR SCREENING FOR OTHER VIRAL DISEASES: Primary | ICD-10-CM

## 2020-01-01 LAB
ABO + RH BLD: NORMAL
ABO + RH BLD: NORMAL
ALBUMIN SERPL-MCNC: 3.6 G/DL (ref 3.4–5)
ALBUMIN SERPL-MCNC: 3.7 G/DL (ref 3.4–5)
ALBUMIN SERPL-MCNC: 4 G/DL (ref 3.4–5)
ALBUMIN SERPL-MCNC: 4.1 G/DL (ref 3.4–5)
ALBUMIN UR-MCNC: 30 MG/DL
ALP SERPL-CCNC: 62 U/L (ref 40–150)
ALP SERPL-CCNC: 78 U/L (ref 40–150)
ALP SERPL-CCNC: 83 U/L (ref 40–150)
ALT SERPL W P-5'-P-CCNC: 18 U/L (ref 0–50)
ALT SERPL W P-5'-P-CCNC: 18 U/L (ref 0–50)
ALT SERPL W P-5'-P-CCNC: 28 U/L (ref 0–50)
ANION GAP SERPL CALCULATED.3IONS-SCNC: 10 MMOL/L (ref 3–14)
ANION GAP SERPL CALCULATED.3IONS-SCNC: 11 MMOL/L (ref 3–14)
ANION GAP SERPL CALCULATED.3IONS-SCNC: 13 MMOL/L (ref 3–14)
ANION GAP SERPL CALCULATED.3IONS-SCNC: 4 MMOL/L (ref 3–14)
ANION GAP SERPL CALCULATED.3IONS-SCNC: 5 MMOL/L (ref 3–14)
ANION GAP SERPL CALCULATED.3IONS-SCNC: 6 MMOL/L (ref 3–14)
ANION GAP SERPL CALCULATED.3IONS-SCNC: 6 MMOL/L (ref 3–14)
ANION GAP SERPL CALCULATED.3IONS-SCNC: 9 MMOL/L (ref 3–14)
ANION GAP SERPL CALCULATED.3IONS-SCNC: 9 MMOL/L (ref 3–14)
APPEARANCE UR: ABNORMAL
AST SERPL W P-5'-P-CCNC: 13 U/L (ref 0–45)
AST SERPL W P-5'-P-CCNC: 18 U/L (ref 0–45)
AST SERPL W P-5'-P-CCNC: 22 U/L (ref 0–45)
BACTERIA SPEC CULT: NO GROWTH
BACTERIA SPEC CULT: NO GROWTH
BASOPHILS # BLD AUTO: 0 10E9/L (ref 0–0.2)
BASOPHILS # BLD AUTO: 0.1 10E9/L (ref 0–0.2)
BASOPHILS # BLD AUTO: 0.1 10E9/L (ref 0–0.2)
BASOPHILS NFR BLD AUTO: 0.1 %
BASOPHILS NFR BLD AUTO: 0.6 %
BASOPHILS NFR BLD AUTO: 0.8 %
BILIRUB SERPL-MCNC: 0.5 MG/DL (ref 0.2–1.3)
BILIRUB SERPL-MCNC: 0.5 MG/DL (ref 0.2–1.3)
BILIRUB SERPL-MCNC: 0.6 MG/DL (ref 0.2–1.3)
BILIRUB UR QL STRIP: NEGATIVE
BLD GP AB SCN SERPL QL: NORMAL
BLOOD BANK CMNT PATIENT-IMP: NORMAL
BUN SERPL-MCNC: 12 MG/DL (ref 7–30)
BUN SERPL-MCNC: 21 MG/DL (ref 7–30)
BUN SERPL-MCNC: 26 MG/DL (ref 7–30)
BUN SERPL-MCNC: 26 MG/DL (ref 7–30)
BUN SERPL-MCNC: 30 MG/DL (ref 7–30)
BUN SERPL-MCNC: 32 MG/DL (ref 7–30)
BUN SERPL-MCNC: 39 MG/DL (ref 7–30)
BUN SERPL-MCNC: 49 MG/DL (ref 7–30)
BUN SERPL-MCNC: 51 MG/DL (ref 7–30)
C COLI+JEJUNI+LARI FUSA STL QL NAA+PROBE: NOT DETECTED
C DIFF TOX B STL QL: NEGATIVE
CALCIUM SERPL-MCNC: 10.1 MG/DL (ref 8.5–10.1)
CALCIUM SERPL-MCNC: 7.9 MG/DL (ref 8.5–10.1)
CALCIUM SERPL-MCNC: 8.1 MG/DL (ref 8.5–10.1)
CALCIUM SERPL-MCNC: 8.6 MG/DL (ref 8.5–10.1)
CALCIUM SERPL-MCNC: 9 MG/DL (ref 8.5–10.1)
CALCIUM SERPL-MCNC: 9.3 MG/DL (ref 8.5–10.1)
CALCIUM SERPL-MCNC: 9.9 MG/DL (ref 8.5–10.1)
CHLORIDE SERPL-SCNC: 105 MMOL/L (ref 94–109)
CHLORIDE SERPL-SCNC: 106 MMOL/L (ref 94–109)
CHLORIDE SERPL-SCNC: 110 MMOL/L (ref 94–109)
CHLORIDE SERPL-SCNC: 110 MMOL/L (ref 94–109)
CHLORIDE SERPL-SCNC: 112 MMOL/L (ref 94–109)
CHLORIDE SERPL-SCNC: 115 MMOL/L (ref 94–109)
CHLORIDE SERPL-SCNC: 99 MMOL/L (ref 94–109)
CHOLEST SERPL-MCNC: 231 MG/DL
CO2 SERPL-SCNC: 16 MMOL/L (ref 20–32)
CO2 SERPL-SCNC: 19 MMOL/L (ref 20–32)
CO2 SERPL-SCNC: 20 MMOL/L (ref 20–32)
CO2 SERPL-SCNC: 22 MMOL/L (ref 20–32)
CO2 SERPL-SCNC: 23 MMOL/L (ref 20–32)
CO2 SERPL-SCNC: 26 MMOL/L (ref 20–32)
CO2 SERPL-SCNC: 27 MMOL/L (ref 20–32)
COLONOSCOPY: NORMAL
COLOR UR AUTO: YELLOW
COPATH REPORT: NORMAL
CREAT BLD-MCNC: 1.5 MG/DL (ref 0.52–1.04)
CREAT SERPL-MCNC: 1.05 MG/DL (ref 0.52–1.04)
CREAT SERPL-MCNC: 1.31 MG/DL (ref 0.52–1.04)
CREAT SERPL-MCNC: 1.32 MG/DL (ref 0.52–1.04)
CREAT SERPL-MCNC: 1.39 MG/DL (ref 0.52–1.04)
CREAT SERPL-MCNC: 1.5 MG/DL (ref 0.52–1.04)
CREAT SERPL-MCNC: 1.64 MG/DL (ref 0.52–1.04)
CREAT SERPL-MCNC: 1.73 MG/DL (ref 0.52–1.04)
CREAT SERPL-MCNC: 2.38 MG/DL (ref 0.52–1.04)
CREAT SERPL-MCNC: 2.6 MG/DL (ref 0.52–1.04)
CREAT UR-MCNC: 164 MG/DL
DIFFERENTIAL METHOD BLD: ABNORMAL
DIFFERENTIAL METHOD BLD: ABNORMAL
DIFFERENTIAL METHOD BLD: NORMAL
EC STX1 GENE STL QL NAA+PROBE: NOT DETECTED
EC STX2 GENE STL QL NAA+PROBE: NOT DETECTED
ENTERIC PATHOGEN COMMENT: NORMAL
EOSINOPHIL # BLD AUTO: 0 10E9/L (ref 0–0.7)
EOSINOPHIL # BLD AUTO: 0.1 10E9/L (ref 0–0.7)
EOSINOPHIL # BLD AUTO: 0.1 10E9/L (ref 0–0.7)
EOSINOPHIL NFR BLD AUTO: 0 %
EOSINOPHIL NFR BLD AUTO: 0.6 %
EOSINOPHIL NFR BLD AUTO: 1 %
ERYTHROCYTE [DISTWIDTH] IN BLOOD BY AUTOMATED COUNT: 13.7 % (ref 10–15)
ERYTHROCYTE [DISTWIDTH] IN BLOOD BY AUTOMATED COUNT: 13.9 % (ref 10–15)
ERYTHROCYTE [DISTWIDTH] IN BLOOD BY AUTOMATED COUNT: 13.9 % (ref 10–15)
ERYTHROCYTE [DISTWIDTH] IN BLOOD BY AUTOMATED COUNT: 14 % (ref 10–15)
ERYTHROCYTE [DISTWIDTH] IN BLOOD BY AUTOMATED COUNT: 14.5 % (ref 10–15)
ERYTHROCYTE [DISTWIDTH] IN BLOOD BY AUTOMATED COUNT: 14.5 % (ref 10–15)
ERYTHROCYTE [DISTWIDTH] IN BLOOD BY AUTOMATED COUNT: 14.6 % (ref 10–15)
ERYTHROCYTE [DISTWIDTH] IN BLOOD BY AUTOMATED COUNT: 14.9 % (ref 10–15)
ERYTHROCYTE [DISTWIDTH] IN BLOOD BY AUTOMATED COUNT: 15.5 % (ref 10–15)
FERRITIN SERPL-MCNC: 52 NG/ML (ref 8–252)
FRACT EXCRET NA UR+SERPL-RTO: <0.1 %
GFR SERPL CREATININE-BSD FRML MDRD: 17 ML/MIN/{1.73_M2}
GFR SERPL CREATININE-BSD FRML MDRD: 19 ML/MIN/{1.73_M2}
GFR SERPL CREATININE-BSD FRML MDRD: 28 ML/MIN/{1.73_M2}
GFR SERPL CREATININE-BSD FRML MDRD: 30 ML/MIN/{1.73_M2}
GFR SERPL CREATININE-BSD FRML MDRD: 33 ML/MIN/{1.73_M2}
GFR SERPL CREATININE-BSD FRML MDRD: 34 ML/MIN/{1.73_M2}
GFR SERPL CREATININE-BSD FRML MDRD: 36 ML/MIN/{1.73_M2}
GFR SERPL CREATININE-BSD FRML MDRD: 39 ML/MIN/{1.73_M2}
GFR SERPL CREATININE-BSD FRML MDRD: 39 ML/MIN/{1.73_M2}
GFR SERPL CREATININE-BSD FRML MDRD: 51 ML/MIN/{1.73_M2}
GLUCOSE SERPL-MCNC: 104 MG/DL (ref 70–99)
GLUCOSE SERPL-MCNC: 123 MG/DL (ref 70–99)
GLUCOSE SERPL-MCNC: 136 MG/DL (ref 70–99)
GLUCOSE SERPL-MCNC: 142 MG/DL (ref 70–99)
GLUCOSE SERPL-MCNC: 71 MG/DL (ref 70–99)
GLUCOSE SERPL-MCNC: 81 MG/DL (ref 70–99)
GLUCOSE SERPL-MCNC: 88 MG/DL (ref 70–99)
GLUCOSE SERPL-MCNC: 96 MG/DL (ref 70–99)
GLUCOSE SERPL-MCNC: 99 MG/DL (ref 70–99)
GLUCOSE UR STRIP-MCNC: NEGATIVE MG/DL
HCT VFR BLD AUTO: 27.4 % (ref 35–47)
HCT VFR BLD AUTO: 27.7 % (ref 35–47)
HCT VFR BLD AUTO: 31 % (ref 35–47)
HCT VFR BLD AUTO: 33.9 % (ref 35–47)
HCT VFR BLD AUTO: 37.6 % (ref 35–47)
HCT VFR BLD AUTO: 38.3 % (ref 35–47)
HCT VFR BLD AUTO: 41.8 % (ref 35–47)
HCT VFR BLD AUTO: 45.8 % (ref 35–47)
HCT VFR BLD AUTO: 47.1 % (ref 35–47)
HDLC SERPL-MCNC: 51 MG/DL
HEMOCCULT STL QL: POSITIVE
HGB BLD-MCNC: 10.2 G/DL (ref 11.7–15.7)
HGB BLD-MCNC: 11.4 G/DL (ref 11.7–15.7)
HGB BLD-MCNC: 12.8 G/DL (ref 11.7–15.7)
HGB BLD-MCNC: 13.2 G/DL (ref 11.7–15.7)
HGB BLD-MCNC: 13.3 G/DL (ref 11.7–15.7)
HGB BLD-MCNC: 15.1 G/DL (ref 11.7–15.7)
HGB BLD-MCNC: 15.9 G/DL (ref 11.7–15.7)
HGB BLD-MCNC: 9.2 G/DL (ref 11.7–15.7)
HGB BLD-MCNC: 9.5 G/DL (ref 11.7–15.7)
HGB UR QL STRIP: NEGATIVE
IMM GRANULOCYTES # BLD: 0 10E9/L (ref 0–0.4)
IMM GRANULOCYTES # BLD: 0 10E9/L (ref 0–0.4)
IMM GRANULOCYTES # BLD: 0.1 10E9/L (ref 0–0.4)
IMM GRANULOCYTES NFR BLD: 0.1 %
IMM GRANULOCYTES NFR BLD: 0.2 %
IMM GRANULOCYTES NFR BLD: 0.3 %
IRON SATN MFR SERPL: 6 % (ref 15–46)
IRON SERPL-MCNC: 12 UG/DL (ref 35–180)
KETONES UR STRIP-MCNC: 5 MG/DL
LACTATE BLD-SCNC: 1.8 MMOL/L (ref 0.7–2)
LACTATE BLD-SCNC: 2.2 MMOL/L (ref 0.7–2)
LDLC SERPL CALC-MCNC: 142 MG/DL
LEUKOCYTE ESTERASE UR QL STRIP: NEGATIVE
LIPASE SERPL-CCNC: 443 U/L (ref 73–393)
LYMPHOCYTES # BLD AUTO: 1 10E9/L (ref 0.8–5.3)
LYMPHOCYTES # BLD AUTO: 1.8 10E9/L (ref 0.8–5.3)
LYMPHOCYTES # BLD AUTO: 1.9 10E9/L (ref 0.8–5.3)
LYMPHOCYTES NFR BLD AUTO: 22.2 %
LYMPHOCYTES NFR BLD AUTO: 23.3 %
LYMPHOCYTES NFR BLD AUTO: 5.2 %
MCH RBC QN AUTO: 28.1 PG (ref 26.5–33)
MCH RBC QN AUTO: 28.3 PG (ref 26.5–33)
MCH RBC QN AUTO: 28.4 PG (ref 26.5–33)
MCH RBC QN AUTO: 28.9 PG (ref 26.5–33)
MCH RBC QN AUTO: 29 PG (ref 26.5–33)
MCH RBC QN AUTO: 29.2 PG (ref 26.5–33)
MCH RBC QN AUTO: 29.7 PG (ref 26.5–33)
MCH RBC QN AUTO: 29.8 PG (ref 26.5–33)
MCH RBC QN AUTO: 30.6 PG (ref 26.5–33)
MCHC RBC AUTO-ENTMCNC: 31.8 G/DL (ref 31.5–36.5)
MCHC RBC AUTO-ENTMCNC: 32.9 G/DL (ref 31.5–36.5)
MCHC RBC AUTO-ENTMCNC: 33 G/DL (ref 31.5–36.5)
MCHC RBC AUTO-ENTMCNC: 33.2 G/DL (ref 31.5–36.5)
MCHC RBC AUTO-ENTMCNC: 33.6 G/DL (ref 31.5–36.5)
MCHC RBC AUTO-ENTMCNC: 33.8 G/DL (ref 31.5–36.5)
MCHC RBC AUTO-ENTMCNC: 34 G/DL (ref 31.5–36.5)
MCHC RBC AUTO-ENTMCNC: 34.5 G/DL (ref 31.5–36.5)
MCHC RBC AUTO-ENTMCNC: 34.7 G/DL (ref 31.5–36.5)
MCV RBC AUTO: 83 FL (ref 78–100)
MCV RBC AUTO: 86 FL (ref 78–100)
MCV RBC AUTO: 87 FL (ref 78–100)
MCV RBC AUTO: 88 FL (ref 78–100)
MCV RBC AUTO: 88 FL (ref 78–100)
MCV RBC AUTO: 89 FL (ref 78–100)
MCV RBC AUTO: 90 FL (ref 78–100)
MONOCYTES # BLD AUTO: 0.8 10E9/L (ref 0–1.3)
MONOCYTES # BLD AUTO: 0.8 10E9/L (ref 0–1.3)
MONOCYTES # BLD AUTO: 1.3 10E9/L (ref 0–1.3)
MONOCYTES NFR BLD AUTO: 7 %
MONOCYTES NFR BLD AUTO: 9 %
MONOCYTES NFR BLD AUTO: 9.7 %
MUCOUS THREADS #/AREA URNS LPF: PRESENT /LPF
NEUTROPHILS # BLD AUTO: 16.4 10E9/L (ref 1.6–8.3)
NEUTROPHILS # BLD AUTO: 5 10E9/L (ref 1.6–8.3)
NEUTROPHILS # BLD AUTO: 5.9 10E9/L (ref 1.6–8.3)
NEUTROPHILS NFR BLD AUTO: 65.1 %
NEUTROPHILS NFR BLD AUTO: 67.4 %
NEUTROPHILS NFR BLD AUTO: 87.4 %
NITRATE UR QL: NEGATIVE
NONHDLC SERPL-MCNC: 180 MG/DL
NOROV GI+II ORF1-ORF2 JNC STL QL NAA+PR: NOT DETECTED
NRBC # BLD AUTO: 0 10*3/UL
NRBC # BLD AUTO: 0 10*3/UL
NRBC BLD AUTO-RTO: 0 /100
NRBC BLD AUTO-RTO: 0 /100
PH UR STRIP: 5 PH (ref 5–7)
PHOSPHATE SERPL-MCNC: 4.1 MG/DL (ref 2.5–4.5)
PHOSPHATE SERPL-MCNC: 4.2 MG/DL (ref 2.5–4.5)
PLATELET # BLD AUTO: 230 10E9/L (ref 150–450)
PLATELET # BLD AUTO: 250 10E9/L (ref 150–450)
PLATELET # BLD AUTO: 270 10E9/L (ref 150–450)
PLATELET # BLD AUTO: 285 10E9/L (ref 150–450)
PLATELET # BLD AUTO: 288 10E9/L (ref 150–450)
PLATELET # BLD AUTO: 293 10E9/L (ref 150–450)
PLATELET # BLD AUTO: 322 10E9/L (ref 150–450)
PLATELET # BLD AUTO: 380 10E9/L (ref 150–450)
PLATELET # BLD AUTO: 397 10E9/L (ref 150–450)
POTASSIUM SERPL-SCNC: 3.1 MMOL/L (ref 3.4–5.3)
POTASSIUM SERPL-SCNC: 3.5 MMOL/L (ref 3.4–5.3)
POTASSIUM SERPL-SCNC: 3.6 MMOL/L (ref 3.4–5.3)
POTASSIUM SERPL-SCNC: 3.8 MMOL/L (ref 3.4–5.3)
POTASSIUM SERPL-SCNC: 3.9 MMOL/L (ref 3.4–5.3)
POTASSIUM SERPL-SCNC: 4.5 MMOL/L (ref 3.4–5.3)
POTASSIUM SERPL-SCNC: 4.6 MMOL/L (ref 3.4–5.3)
PROCALCITONIN SERPL-MCNC: 19.23 NG/ML
PROCALCITONIN SERPL-MCNC: 3.94 NG/ML
PROT SERPL-MCNC: 7.2 G/DL (ref 6.8–8.8)
PROT SERPL-MCNC: 7.8 G/DL (ref 6.8–8.8)
PROT SERPL-MCNC: 8.2 G/DL (ref 6.8–8.8)
RADIOLOGIST FLAGS: NORMAL
RBC # BLD AUTO: 3.15 10E12/L (ref 3.8–5.2)
RBC # BLD AUTO: 3.2 10E12/L (ref 3.8–5.2)
RBC # BLD AUTO: 3.52 10E12/L (ref 3.8–5.2)
RBC # BLD AUTO: 3.94 10E12/L (ref 3.8–5.2)
RBC # BLD AUTO: 4.18 10E12/L (ref 3.8–5.2)
RBC # BLD AUTO: 4.43 10E12/L (ref 3.8–5.2)
RBC # BLD AUTO: 4.69 10E12/L (ref 3.8–5.2)
RBC # BLD AUTO: 5.33 10E12/L (ref 3.8–5.2)
RBC # BLD AUTO: 5.66 10E12/L (ref 3.8–5.2)
RBC #/AREA URNS AUTO: 1 /HPF (ref 0–2)
RVA NSP5 STL QL NAA+PROBE: NOT DETECTED
SALMONELLA SP RPOD STL QL NAA+PROBE: NOT DETECTED
SARS-COV-2 RNA SPEC QL NAA+PROBE: NOT DETECTED
SHIGELLA SP+EIEC IPAH STL QL NAA+PROBE: NOT DETECTED
SODIUM SERPL-SCNC: 131 MMOL/L (ref 133–144)
SODIUM SERPL-SCNC: 131 MMOL/L (ref 133–144)
SODIUM SERPL-SCNC: 134 MMOL/L (ref 133–144)
SODIUM SERPL-SCNC: 135 MMOL/L (ref 133–144)
SODIUM SERPL-SCNC: 137 MMOL/L (ref 133–144)
SODIUM SERPL-SCNC: 137 MMOL/L (ref 133–144)
SODIUM SERPL-SCNC: 138 MMOL/L (ref 133–144)
SODIUM SERPL-SCNC: 140 MMOL/L (ref 133–144)
SODIUM SERPL-SCNC: 142 MMOL/L (ref 133–144)
SODIUM UR-SCNC: 9 MMOL/L
SOURCE: ABNORMAL
SP GR UR STRIP: 1.02 (ref 1–1.03)
SPECIMEN EXP DATE BLD: NORMAL
SPECIMEN SOURCE: NORMAL
SQUAMOUS #/AREA URNS AUTO: <1 /HPF (ref 0–1)
TIBC SERPL-MCNC: 211 UG/DL (ref 240–430)
TRIGL SERPL-MCNC: 190 MG/DL
TSH SERPL DL<=0.005 MIU/L-ACNC: 2.09 MU/L (ref 0.4–4)
UPPER GI ENDOSCOPY: NORMAL
UROBILINOGEN UR STRIP-MCNC: 2 MG/DL (ref 0–2)
V CHOL+PARA RFBL+TRKH+TNAA STL QL NAA+PR: NOT DETECTED
WBC # BLD AUTO: 10.8 10E9/L (ref 4–11)
WBC # BLD AUTO: 12.6 10E9/L (ref 4–11)
WBC # BLD AUTO: 16.2 10E9/L (ref 4–11)
WBC # BLD AUTO: 18.8 10E9/L (ref 4–11)
WBC # BLD AUTO: 5.9 10E9/L (ref 4–11)
WBC # BLD AUTO: 7.2 10E9/L (ref 4–11)
WBC # BLD AUTO: 7.7 10E9/L (ref 4–11)
WBC # BLD AUTO: 8.7 10E9/L (ref 4–11)
WBC # BLD AUTO: 8.9 10E9/L (ref 4–11)
WBC #/AREA URNS AUTO: 1 /HPF (ref 0–5)
Y ENTERO RECN STL QL NAA+PROBE: NOT DETECTED

## 2020-01-01 PROCEDURE — 99232 SBSQ HOSP IP/OBS MODERATE 35: CPT | Performed by: HOSPITALIST

## 2020-01-01 PROCEDURE — 25000132 ZZH RX MED GY IP 250 OP 250 PS 637: Performed by: HOSPITALIST

## 2020-01-01 PROCEDURE — 36415 COLL VENOUS BLD VENIPUNCTURE: CPT | Performed by: INTERNAL MEDICINE

## 2020-01-01 PROCEDURE — 25000125 ZZHC RX 250: Performed by: INTERNAL MEDICINE

## 2020-01-01 PROCEDURE — 83540 ASSAY OF IRON: CPT | Performed by: HOSPITALIST

## 2020-01-01 PROCEDURE — 96360 HYDRATION IV INFUSION INIT: CPT | Mod: 59

## 2020-01-01 PROCEDURE — 25000128 H RX IP 250 OP 636: Performed by: INTERNAL MEDICINE

## 2020-01-01 PROCEDURE — 36415 COLL VENOUS BLD VENIPUNCTURE: CPT | Performed by: HOSPITALIST

## 2020-01-01 PROCEDURE — 96360 HYDRATION IV INFUSION INIT: CPT

## 2020-01-01 PROCEDURE — 99214 OFFICE O/P EST MOD 30 MIN: CPT | Performed by: INTERNAL MEDICINE

## 2020-01-01 PROCEDURE — 25800030 ZZH RX IP 258 OP 636: Performed by: HOSPITALIST

## 2020-01-01 PROCEDURE — 86900 BLOOD TYPING SEROLOGIC ABO: CPT | Performed by: EMERGENCY MEDICINE

## 2020-01-01 PROCEDURE — 12000000 ZZH R&B MED SURG/OB

## 2020-01-01 PROCEDURE — 25800030 ZZH RX IP 258 OP 636: Performed by: INTERNAL MEDICINE

## 2020-01-01 PROCEDURE — 85027 COMPLETE CBC AUTOMATED: CPT | Performed by: INTERNAL MEDICINE

## 2020-01-01 PROCEDURE — 25000128 H RX IP 250 OP 636: Performed by: HOSPITALIST

## 2020-01-01 PROCEDURE — 99285 EMERGENCY DEPT VISIT HI MDM: CPT | Mod: 25

## 2020-01-01 PROCEDURE — 99233 SBSQ HOSP IP/OBS HIGH 50: CPT | Performed by: HOSPITALIST

## 2020-01-01 PROCEDURE — 74177 CT ABD & PELVIS W/CONTRAST: CPT

## 2020-01-01 PROCEDURE — 80048 BASIC METABOLIC PNL TOTAL CA: CPT | Performed by: INTERNAL MEDICINE

## 2020-01-01 PROCEDURE — 80061 LIPID PANEL: CPT | Performed by: INTERNAL MEDICINE

## 2020-01-01 PROCEDURE — 88305 TISSUE EXAM BY PATHOLOGIST: CPT | Performed by: INTERNAL MEDICINE

## 2020-01-01 PROCEDURE — 25800029 ZZH RX IP 258 OP 250: Performed by: INTERNAL MEDICINE

## 2020-01-01 PROCEDURE — 99214 OFFICE O/P EST MOD 30 MIN: CPT | Mod: 95 | Performed by: INTERNAL MEDICINE

## 2020-01-01 PROCEDURE — 45380 COLONOSCOPY AND BIOPSY: CPT | Performed by: INTERNAL MEDICINE

## 2020-01-01 PROCEDURE — 80053 COMPREHEN METABOLIC PANEL: CPT | Performed by: NURSE PRACTITIONER

## 2020-01-01 PROCEDURE — 97161 PT EVAL LOW COMPLEX 20 MIN: CPT | Mod: GP

## 2020-01-01 PROCEDURE — 80053 COMPREHEN METABOLIC PANEL: CPT | Performed by: EMERGENCY MEDICINE

## 2020-01-01 PROCEDURE — 85025 COMPLETE CBC W/AUTO DIFF WBC: CPT | Performed by: INTERNAL MEDICINE

## 2020-01-01 PROCEDURE — 82728 ASSAY OF FERRITIN: CPT | Performed by: INTERNAL MEDICINE

## 2020-01-01 PROCEDURE — 87493 C DIFF AMPLIFIED PROBE: CPT | Performed by: EMERGENCY MEDICINE

## 2020-01-01 PROCEDURE — 84100 ASSAY OF PHOSPHORUS: CPT | Mod: 59 | Performed by: INTERNAL MEDICINE

## 2020-01-01 PROCEDURE — 87506 IADNA-DNA/RNA PROBE TQ 6-11: CPT | Performed by: EMERGENCY MEDICINE

## 2020-01-01 PROCEDURE — 83605 ASSAY OF LACTIC ACID: CPT | Performed by: HOSPITALIST

## 2020-01-01 PROCEDURE — 99223 1ST HOSP IP/OBS HIGH 75: CPT | Mod: AI | Performed by: HOSPITALIST

## 2020-01-01 PROCEDURE — 25800030 ZZH RX IP 258 OP 636: Performed by: EMERGENCY MEDICINE

## 2020-01-01 PROCEDURE — 87040 BLOOD CULTURE FOR BACTERIA: CPT | Performed by: HOSPITALIST

## 2020-01-01 PROCEDURE — 86901 BLOOD TYPING SEROLOGIC RH(D): CPT | Performed by: EMERGENCY MEDICINE

## 2020-01-01 PROCEDURE — 77067 SCR MAMMO BI INCL CAD: CPT

## 2020-01-01 PROCEDURE — 74176 CT ABD & PELVIS W/O CONTRAST: CPT

## 2020-01-01 PROCEDURE — 25000128 H RX IP 250 OP 636: Performed by: EMERGENCY MEDICINE

## 2020-01-01 PROCEDURE — U0003 INFECTIOUS AGENT DETECTION BY NUCLEIC ACID (DNA OR RNA); SEVERE ACUTE RESPIRATORY SYNDROME CORONAVIRUS 2 (SARS-COV-2) (CORONAVIRUS DISEASE [COVID-19]), AMPLIFIED PROBE TECHNIQUE, MAKING USE OF HIGH THROUGHPUT TECHNOLOGIES AS DESCRIBED BY CMS-2020-01-R: HCPCS | Performed by: INTERNAL MEDICINE

## 2020-01-01 PROCEDURE — 99215 OFFICE O/P EST HI 40 MIN: CPT | Performed by: INTERNAL MEDICINE

## 2020-01-01 PROCEDURE — 83690 ASSAY OF LIPASE: CPT | Performed by: EMERGENCY MEDICINE

## 2020-01-01 PROCEDURE — 83550 IRON BINDING TEST: CPT | Performed by: HOSPITALIST

## 2020-01-01 PROCEDURE — 85027 COMPLETE CBC AUTOMATED: CPT | Performed by: NURSE PRACTITIONER

## 2020-01-01 PROCEDURE — 97116 GAIT TRAINING THERAPY: CPT | Mod: GP

## 2020-01-01 PROCEDURE — 83605 ASSAY OF LACTIC ACID: CPT | Performed by: EMERGENCY MEDICINE

## 2020-01-01 PROCEDURE — 85027 COMPLETE CBC AUTOMATED: CPT | Performed by: HOSPITALIST

## 2020-01-01 PROCEDURE — 96361 HYDRATE IV INFUSION ADD-ON: CPT

## 2020-01-01 PROCEDURE — 25000125 ZZHC RX 250: Performed by: EMERGENCY MEDICINE

## 2020-01-01 PROCEDURE — 88305 TISSUE EXAM BY PATHOLOGIST: CPT | Mod: 26 | Performed by: INTERNAL MEDICINE

## 2020-01-01 PROCEDURE — 99214 OFFICE O/P EST MOD 30 MIN: CPT | Performed by: NURSE PRACTITIONER

## 2020-01-01 PROCEDURE — 86850 RBC ANTIBODY SCREEN: CPT | Performed by: EMERGENCY MEDICINE

## 2020-01-01 PROCEDURE — 84300 ASSAY OF URINE SODIUM: CPT | Performed by: HOSPITALIST

## 2020-01-01 PROCEDURE — 36415 COLL VENOUS BLD VENIPUNCTURE: CPT | Performed by: NURSE PRACTITIONER

## 2020-01-01 PROCEDURE — 99239 HOSP IP/OBS DSCHRG MGMT >30: CPT | Performed by: HOSPITALIST

## 2020-01-01 PROCEDURE — 71045 X-RAY EXAM CHEST 1 VIEW: CPT

## 2020-01-01 PROCEDURE — 80048 BASIC METABOLIC PNL TOTAL CA: CPT | Performed by: HOSPITALIST

## 2020-01-01 PROCEDURE — 99495 TRANSJ CARE MGMT MOD F2F 14D: CPT | Mod: 95 | Performed by: INTERNAL MEDICINE

## 2020-01-01 PROCEDURE — 40000894 ZZH STATISTIC OT IP EVAL DEFER: Performed by: OCCUPATIONAL THERAPIST

## 2020-01-01 PROCEDURE — 85025 COMPLETE CBC W/AUTO DIFF WBC: CPT | Performed by: EMERGENCY MEDICINE

## 2020-01-01 PROCEDURE — 84443 ASSAY THYROID STIM HORMONE: CPT | Performed by: INTERNAL MEDICINE

## 2020-01-01 PROCEDURE — 82565 ASSAY OF CREATININE: CPT

## 2020-01-01 PROCEDURE — 81001 URINALYSIS AUTO W/SCOPE: CPT | Performed by: EMERGENCY MEDICINE

## 2020-01-01 PROCEDURE — 25000132 ZZH RX MED GY IP 250 OP 250 PS 637: Performed by: PHYSICIAN ASSISTANT

## 2020-01-01 PROCEDURE — 25000132 ZZH RX MED GY IP 250 OP 250 PS 637: Performed by: INTERNAL MEDICINE

## 2020-01-01 PROCEDURE — 43239 EGD BIOPSY SINGLE/MULTIPLE: CPT | Performed by: INTERNAL MEDICINE

## 2020-01-01 PROCEDURE — 82272 OCCULT BLD FECES 1-3 TESTS: CPT | Performed by: HOSPITALIST

## 2020-01-01 PROCEDURE — 84145 PROCALCITONIN (PCT): CPT | Performed by: INTERNAL MEDICINE

## 2020-01-01 PROCEDURE — 80069 RENAL FUNCTION PANEL: CPT | Performed by: INTERNAL MEDICINE

## 2020-01-01 PROCEDURE — 93000 ELECTROCARDIOGRAM COMPLETE: CPT | Performed by: INTERNAL MEDICINE

## 2020-01-01 PROCEDURE — 97110 THERAPEUTIC EXERCISES: CPT | Mod: GP

## 2020-01-01 PROCEDURE — 82570 ASSAY OF URINE CREATININE: CPT | Performed by: HOSPITALIST

## 2020-01-01 PROCEDURE — 84145 PROCALCITONIN (PCT): CPT | Performed by: EMERGENCY MEDICINE

## 2020-01-01 PROCEDURE — G0500 MOD SEDAT ENDO SERVICE >5YRS: HCPCS | Performed by: INTERNAL MEDICINE

## 2020-01-01 PROCEDURE — 97530 THERAPEUTIC ACTIVITIES: CPT | Mod: GP

## 2020-01-01 RX ORDER — LABETALOL HYDROCHLORIDE 5 MG/ML
10 INJECTION, SOLUTION INTRAVENOUS
Status: DISCONTINUED | OUTPATIENT
Start: 2020-01-01 | End: 2020-01-01 | Stop reason: HOSPADM

## 2020-01-01 RX ORDER — LIDOCAINE 40 MG/G
CREAM TOPICAL
Status: DISCONTINUED | OUTPATIENT
Start: 2020-01-01 | End: 2020-01-01 | Stop reason: HOSPADM

## 2020-01-01 RX ORDER — MIRTAZAPINE 15 MG/1
15 TABLET, FILM COATED ORAL EVERY EVENING
Qty: 30 TABLET | Refills: 0 | Status: SHIPPED | OUTPATIENT
Start: 2020-01-01 | End: 2020-01-01

## 2020-01-01 RX ORDER — QUETIAPINE FUMARATE 25 MG/1
12.5 TABLET, FILM COATED ORAL 2 TIMES DAILY PRN
Qty: 20 TABLET | Refills: 1 | Status: SHIPPED | OUTPATIENT
Start: 2020-01-01

## 2020-01-01 RX ORDER — NALOXONE HYDROCHLORIDE 0.4 MG/ML
.1-.4 INJECTION, SOLUTION INTRAMUSCULAR; INTRAVENOUS; SUBCUTANEOUS
Status: DISCONTINUED | OUTPATIENT
Start: 2020-01-01 | End: 2020-01-01 | Stop reason: HOSPADM

## 2020-01-01 RX ORDER — ESCITALOPRAM OXALATE 5 MG/1
5 TABLET ORAL DAILY
Qty: 90 TABLET | Refills: 3 | Status: SHIPPED | OUTPATIENT
Start: 2020-01-01 | End: 2020-01-01 | Stop reason: ALTCHOICE

## 2020-01-01 RX ORDER — LIDOCAINE 40 MG/G
CREAM TOPICAL
Status: CANCELLED | OUTPATIENT
Start: 2020-01-01

## 2020-01-01 RX ORDER — POTASSIUM CHLORIDE 1500 MG/1
40 TABLET, EXTENDED RELEASE ORAL ONCE
Status: COMPLETED | OUTPATIENT
Start: 2020-01-01 | End: 2020-01-01

## 2020-01-01 RX ORDER — MIRTAZAPINE 15 MG/1
15 TABLET, FILM COATED ORAL EVERY EVENING
Qty: 30 TABLET | Refills: 1 | Status: SHIPPED | OUTPATIENT
Start: 2020-01-01

## 2020-01-01 RX ORDER — METRONIDAZOLE 250 MG/1
250 TABLET ORAL 3 TIMES DAILY
Qty: 42 TABLET | Refills: 0 | Status: SHIPPED | OUTPATIENT
Start: 2020-01-01 | End: 2020-01-01

## 2020-01-01 RX ORDER — BISACODYL 5 MG
20 TABLET, DELAYED RELEASE (ENTERIC COATED) ORAL ONCE
Status: COMPLETED | OUTPATIENT
Start: 2020-01-01 | End: 2020-01-01

## 2020-01-01 RX ORDER — ONDANSETRON 2 MG/ML
INJECTION INTRAMUSCULAR; INTRAVENOUS PRN
Status: DISCONTINUED | OUTPATIENT
Start: 2020-01-01 | End: 2020-01-01 | Stop reason: HOSPADM

## 2020-01-01 RX ORDER — SODIUM CHLORIDE 9 MG/ML
1000 INJECTION, SOLUTION INTRAVENOUS CONTINUOUS
Status: DISCONTINUED | OUTPATIENT
Start: 2020-01-01 | End: 2020-01-01 | Stop reason: HOSPADM

## 2020-01-01 RX ORDER — PIPERACILLIN SODIUM, TAZOBACTAM SODIUM 2; .25 G/10ML; G/10ML
2.25 INJECTION, POWDER, LYOPHILIZED, FOR SOLUTION INTRAVENOUS EVERY 8 HOURS
Status: DISCONTINUED | OUTPATIENT
Start: 2020-01-01 | End: 2020-01-01

## 2020-01-01 RX ORDER — VENLAFAXINE HYDROCHLORIDE 150 MG/1
300 CAPSULE, EXTENDED RELEASE ORAL DAILY
Status: DISCONTINUED | OUTPATIENT
Start: 2020-01-01 | End: 2020-01-01 | Stop reason: HOSPADM

## 2020-01-01 RX ORDER — SODIUM CHLORIDE 9 MG/ML
INJECTION, SOLUTION INTRAVENOUS CONTINUOUS
Status: DISCONTINUED | OUTPATIENT
Start: 2020-01-01 | End: 2020-01-01 | Stop reason: HOSPADM

## 2020-01-01 RX ORDER — POLYETHYLENE GLYCOL 3350 17 G/17G
238 POWDER, FOR SOLUTION ORAL ONCE
Status: COMPLETED | OUTPATIENT
Start: 2020-01-01 | End: 2020-01-01

## 2020-01-01 RX ORDER — IOPAMIDOL 755 MG/ML
71 INJECTION, SOLUTION INTRAVASCULAR ONCE
Status: DISCONTINUED | OUTPATIENT
Start: 2020-01-01 | End: 2020-01-01 | Stop reason: CLARIF

## 2020-01-01 RX ORDER — QUETIAPINE FUMARATE 25 MG/1
25 TABLET, FILM COATED ORAL 2 TIMES DAILY
Status: CANCELLED | OUTPATIENT
Start: 2020-01-01

## 2020-01-01 RX ORDER — ACETAMINOPHEN 650 MG/1
650 SUPPOSITORY RECTAL EVERY 4 HOURS PRN
Status: DISCONTINUED | OUTPATIENT
Start: 2020-01-01 | End: 2020-01-01 | Stop reason: HOSPADM

## 2020-01-01 RX ORDER — FENTANYL CITRATE 50 UG/ML
INJECTION, SOLUTION INTRAMUSCULAR; INTRAVENOUS PRN
Status: DISCONTINUED | OUTPATIENT
Start: 2020-01-01 | End: 2020-01-01 | Stop reason: HOSPADM

## 2020-01-01 RX ORDER — PANTOPRAZOLE SODIUM 20 MG/1
20 TABLET, DELAYED RELEASE ORAL
Status: DISCONTINUED | OUTPATIENT
Start: 2020-01-01 | End: 2020-01-01 | Stop reason: HOSPADM

## 2020-01-01 RX ORDER — ONDANSETRON 4 MG/1
4 TABLET, ORALLY DISINTEGRATING ORAL EVERY 6 HOURS PRN
Status: DISCONTINUED | OUTPATIENT
Start: 2020-01-01 | End: 2020-01-01 | Stop reason: HOSPADM

## 2020-01-01 RX ORDER — VENLAFAXINE HYDROCHLORIDE 150 MG/1
300 CAPSULE, EXTENDED RELEASE ORAL DAILY
Qty: 180 CAPSULE | Refills: 3 | Status: SHIPPED | OUTPATIENT
Start: 2020-01-01

## 2020-01-01 RX ORDER — SODIUM CHLORIDE 9 MG/ML
1000 INJECTION, SOLUTION INTRAVENOUS CONTINUOUS
Status: DISCONTINUED | OUTPATIENT
Start: 2020-01-01 | End: 2020-01-01

## 2020-01-01 RX ORDER — PANTOPRAZOLE SODIUM 20 MG/1
20 TABLET, DELAYED RELEASE ORAL DAILY
Qty: 90 TABLET | Refills: 1 | Status: SHIPPED | OUTPATIENT
Start: 2020-01-01

## 2020-01-01 RX ORDER — ESCITALOPRAM OXALATE 5 MG/1
5 TABLET ORAL DAILY
Status: DISCONTINUED | OUTPATIENT
Start: 2020-01-01 | End: 2020-01-01 | Stop reason: HOSPADM

## 2020-01-01 RX ORDER — ONDANSETRON 2 MG/ML
4 INJECTION INTRAMUSCULAR; INTRAVENOUS EVERY 6 HOURS PRN
Status: DISCONTINUED | OUTPATIENT
Start: 2020-01-01 | End: 2020-01-01 | Stop reason: HOSPADM

## 2020-01-01 RX ORDER — IOPAMIDOL 755 MG/ML
73 INJECTION, SOLUTION INTRAVASCULAR ONCE
Status: COMPLETED | OUTPATIENT
Start: 2020-01-01 | End: 2020-01-01

## 2020-01-01 RX ORDER — ACETAMINOPHEN 325 MG/1
650 TABLET ORAL EVERY 4 HOURS PRN
Status: DISCONTINUED | OUTPATIENT
Start: 2020-01-01 | End: 2020-01-01 | Stop reason: HOSPADM

## 2020-01-01 RX ORDER — PRAVASTATIN SODIUM 40 MG
40 TABLET ORAL AT BEDTIME
Status: DISCONTINUED | OUTPATIENT
Start: 2020-01-01 | End: 2020-01-01 | Stop reason: HOSPADM

## 2020-01-01 RX ORDER — SODIUM CHLORIDE 9 MG/ML
INJECTION, SOLUTION INTRAVENOUS CONTINUOUS
Status: DISCONTINUED | OUTPATIENT
Start: 2020-01-01 | End: 2020-01-01

## 2020-01-01 RX ORDER — PIPERACILLIN SODIUM, TAZOBACTAM SODIUM 2; .25 G/10ML; G/10ML
2.25 INJECTION, POWDER, LYOPHILIZED, FOR SOLUTION INTRAVENOUS EVERY 6 HOURS
Status: DISCONTINUED | OUTPATIENT
Start: 2020-01-01 | End: 2020-01-01

## 2020-01-01 RX ORDER — ONDANSETRON 2 MG/ML
4 INJECTION INTRAMUSCULAR; INTRAVENOUS EVERY 30 MIN PRN
Status: DISCONTINUED | OUTPATIENT
Start: 2020-01-01 | End: 2020-01-01 | Stop reason: HOSPADM

## 2020-01-01 RX ADMIN — PANTOPRAZOLE SODIUM 20 MG: 20 TABLET, DELAYED RELEASE ORAL at 06:36

## 2020-01-01 RX ADMIN — PIPERACILLIN SODIUM AND TAZOBACTAM SODIUM 2.25 G: 2; .25 INJECTION, POWDER, LYOPHILIZED, FOR SOLUTION INTRAVENOUS at 10:26

## 2020-01-01 RX ADMIN — IRON SUCROSE 200 MG: 20 INJECTION, SOLUTION INTRAVENOUS at 08:27

## 2020-01-01 RX ADMIN — PANTOPRAZOLE SODIUM 20 MG: 20 TABLET, DELAYED RELEASE ORAL at 09:32

## 2020-01-01 RX ADMIN — ESCITALOPRAM 5 MG: 5 TABLET, FILM COATED ORAL at 09:31

## 2020-01-01 RX ADMIN — SODIUM CHLORIDE: 9 INJECTION, SOLUTION INTRAVENOUS at 04:44

## 2020-01-01 RX ADMIN — POLYETHYLENE GLYCOL 3350 238 G: 17 POWDER, FOR SOLUTION ORAL at 18:10

## 2020-01-01 RX ADMIN — AMOXICILLIN AND CLAVULANATE POTASSIUM 1 TABLET: 875; 125 TABLET, FILM COATED ORAL at 18:07

## 2020-01-01 RX ADMIN — VENLAFAXINE HYDROCHLORIDE 300 MG: 150 CAPSULE, EXTENDED RELEASE ORAL at 09:31

## 2020-01-01 RX ADMIN — ESCITALOPRAM 5 MG: 5 TABLET, FILM COATED ORAL at 10:28

## 2020-01-01 RX ADMIN — VENLAFAXINE HYDROCHLORIDE 300 MG: 150 CAPSULE, EXTENDED RELEASE ORAL at 09:32

## 2020-01-01 RX ADMIN — SODIUM CHLORIDE: 9 INJECTION, SOLUTION INTRAVENOUS at 10:53

## 2020-01-01 RX ADMIN — PANTOPRAZOLE SODIUM 20 MG: 20 TABLET, DELAYED RELEASE ORAL at 06:41

## 2020-01-01 RX ADMIN — SODIUM CHLORIDE: 9 INJECTION, SOLUTION INTRAVENOUS at 04:38

## 2020-01-01 RX ADMIN — SODIUM CHLORIDE 1000 ML: 9 INJECTION, SOLUTION INTRAVENOUS at 21:10

## 2020-01-01 RX ADMIN — Medication 1 MG: at 21:31

## 2020-01-01 RX ADMIN — IRON SUCROSE 200 MG: 20 INJECTION, SOLUTION INTRAVENOUS at 12:29

## 2020-01-01 RX ADMIN — PRAVASTATIN SODIUM 40 MG: 40 TABLET ORAL at 21:31

## 2020-01-01 RX ADMIN — PIPERACILLIN SODIUM AND TAZOBACTAM SODIUM 2.25 G: 2; .25 INJECTION, POWDER, LYOPHILIZED, FOR SOLUTION INTRAVENOUS at 10:50

## 2020-01-01 RX ADMIN — ESCITALOPRAM 5 MG: 5 TABLET, FILM COATED ORAL at 17:26

## 2020-01-01 RX ADMIN — PIPERACILLIN SODIUM AND TAZOBACTAM SODIUM 2.25 G: 2; .25 INJECTION, POWDER, LYOPHILIZED, FOR SOLUTION INTRAVENOUS at 17:31

## 2020-01-01 RX ADMIN — PIPERACILLIN SODIUM AND TAZOBACTAM SODIUM 2.25 G: 2; .25 INJECTION, POWDER, LYOPHILIZED, FOR SOLUTION INTRAVENOUS at 10:45

## 2020-01-01 RX ADMIN — QUETIAPINE 12.5 MG: 25 TABLET, FILM COATED ORAL at 16:24

## 2020-01-01 RX ADMIN — SODIUM CHLORIDE 1000 ML: 9 INJECTION, SOLUTION INTRAVENOUS at 16:02

## 2020-01-01 RX ADMIN — ESCITALOPRAM 5 MG: 5 TABLET, FILM COATED ORAL at 09:32

## 2020-01-01 RX ADMIN — VENLAFAXINE HYDROCHLORIDE 300 MG: 150 CAPSULE, EXTENDED RELEASE ORAL at 17:26

## 2020-01-01 RX ADMIN — PANTOPRAZOLE SODIUM 20 MG: 20 TABLET, DELAYED RELEASE ORAL at 17:26

## 2020-01-01 RX ADMIN — IOPAMIDOL 73 ML: 755 INJECTION, SOLUTION INTRAVENOUS at 17:53

## 2020-01-01 RX ADMIN — QUETIAPINE 12.5 MG: 25 TABLET, FILM COATED ORAL at 13:13

## 2020-01-01 RX ADMIN — PIPERACILLIN SODIUM AND TAZOBACTAM SODIUM 2.25 G: 2; .25 INJECTION, POWDER, LYOPHILIZED, FOR SOLUTION INTRAVENOUS at 21:37

## 2020-01-01 RX ADMIN — SODIUM CHLORIDE 1000 ML: 9 INJECTION, SOLUTION INTRAVENOUS at 04:38

## 2020-01-01 RX ADMIN — AMOXICILLIN AND CLAVULANATE POTASSIUM 1 TABLET: 875; 125 TABLET, FILM COATED ORAL at 06:41

## 2020-01-01 RX ADMIN — BISACODYL 20 MG: 5 TABLET, COATED ORAL at 19:14

## 2020-01-01 RX ADMIN — PIPERACILLIN SODIUM AND TAZOBACTAM SODIUM 2.25 G: 2; .25 INJECTION, POWDER, LYOPHILIZED, FOR SOLUTION INTRAVENOUS at 04:41

## 2020-01-01 RX ADMIN — VENLAFAXINE HYDROCHLORIDE 300 MG: 150 CAPSULE, EXTENDED RELEASE ORAL at 10:28

## 2020-01-01 RX ADMIN — SODIUM CHLORIDE: 9 INJECTION, SOLUTION INTRAVENOUS at 23:55

## 2020-01-01 RX ADMIN — PIPERACILLIN SODIUM AND TAZOBACTAM SODIUM 2.25 G: 2; .25 INJECTION, POWDER, LYOPHILIZED, FOR SOLUTION INTRAVENOUS at 16:26

## 2020-01-01 RX ADMIN — IRON SUCROSE 200 MG: 20 INJECTION, SOLUTION INTRAVENOUS at 09:27

## 2020-01-01 RX ADMIN — PIPERACILLIN SODIUM AND TAZOBACTAM SODIUM 2.25 G: 2; .25 INJECTION, POWDER, LYOPHILIZED, FOR SOLUTION INTRAVENOUS at 02:30

## 2020-01-01 RX ADMIN — SODIUM BICARBONATE: 84 INJECTION, SOLUTION INTRAVENOUS at 18:10

## 2020-01-01 RX ADMIN — PRAVASTATIN SODIUM 40 MG: 40 TABLET ORAL at 23:39

## 2020-01-01 RX ADMIN — SODIUM CHLORIDE: 9 INJECTION, SOLUTION INTRAVENOUS at 16:06

## 2020-01-01 RX ADMIN — SODIUM CHLORIDE 62 ML: 9 INJECTION, SOLUTION INTRAVENOUS at 17:51

## 2020-01-01 RX ADMIN — SODIUM CHLORIDE: 9 INJECTION, SOLUTION INTRAVENOUS at 01:37

## 2020-01-01 RX ADMIN — POTASSIUM CHLORIDE 40 MEQ: 1500 TABLET, EXTENDED RELEASE ORAL at 11:00

## 2020-01-01 ASSESSMENT — ACTIVITIES OF DAILY LIVING (ADL)
ADLS_ACUITY_SCORE: 21
ADLS_ACUITY_SCORE: 22
ADLS_ACUITY_SCORE: 22
ADLS_ACUITY_SCORE: 25
ADLS_ACUITY_SCORE: 23
ADLS_ACUITY_SCORE: 25
ADLS_ACUITY_SCORE: 21
ADLS_ACUITY_SCORE: 25
ADLS_ACUITY_SCORE: 21
ADLS_ACUITY_SCORE: 25
ADLS_ACUITY_SCORE: 22
SWALLOWING: 0-->SWALLOWS FOODS/LIQUIDS WITHOUT DIFFICULTY
ADLS_ACUITY_SCORE: 19
ADLS_ACUITY_SCORE: 27
RETIRED_COMMUNICATION: 2-->DIFFICULTY UNDERSTANDING (NOT RELATED TO LANGUAGE BARRIER)
ADLS_ACUITY_SCORE: 28
ADLS_ACUITY_SCORE: 16
ADLS_ACUITY_SCORE: 25
ADLS_ACUITY_SCORE: 19
RETIRED_EATING: 0-->INDEPENDENT
WHICH_OF_THE_ABOVE_FUNCTIONAL_RISKS_HAD_A_RECENT_ONSET_OR_CHANGE?: AMBULATION;TRANSFERRING
DRESS: 2-->ASSISTIVE PERSON
ADLS_ACUITY_SCORE: 28
ADLS_ACUITY_SCORE: 25

## 2020-01-01 ASSESSMENT — ENCOUNTER SYMPTOMS
ACTIVITY CHANGE: 1
VOMITING: 0
UNEXPECTED WEIGHT CHANGE: 1
APPETITE CHANGE: 1
ABDOMINAL PAIN: 1
DIARRHEA: 1
ANAL BLEEDING: 1
DIZZINESS: 1

## 2020-01-01 ASSESSMENT — PAIN SCALES - GENERAL: PAINLEVEL: NO PAIN (0)

## 2020-01-01 ASSESSMENT — PATIENT HEALTH QUESTIONNAIRE - PHQ9
SUM OF ALL RESPONSES TO PHQ QUESTIONS 1-9: 12
SUM OF ALL RESPONSES TO PHQ QUESTIONS 1-9: 2

## 2020-01-01 ASSESSMENT — MIFFLIN-ST. JEOR
SCORE: 1119.11
SCORE: 1064.21
SCORE: 1104.58
SCORE: 1051.51

## 2020-01-02 NOTE — TELEPHONE ENCOUNTER
Hi,   Kevin said [facility] needs your approval to refill her venlafaxine (EFFEXOR XR) 150 MG 24 hr capsule. She has one refill left but [facility] won't ok the refill without your approval (or Dr Villalobos). Can you please do this for them?  Thanks,  Priti

## 2020-01-24 NOTE — PATIENT INSTRUCTIONS
We will check a CT scan of your abdomen and do some labs today     Try your best to drink the protein drinks     I will be in contact with you with the results

## 2020-01-24 NOTE — PROGRESS NOTES
HPI    Here with  Alejandro who provides history   Lost interest in doing things she used to enjoy   Has abdominal pain  Upset stomach   Hasn't been eating   Once was nauseous without vomiting   No diarrhea   No fevers  Down 25 lbs the last 3 months   Spends a lot of time in bed   Takes an excedrin every morning for 55 years d/t headaches    Has jaw pain and hx trigeminal neuralgia. Going to neurosurg next week      Past Medical History:   Diagnosis Date     Anxiety      Arthritis      Cancer (H) Aug 2013    right breast     Coronary artery calcification      Depressive disorder      Hypertension      Iron deficiency anaemia     Had endoscopy and colonoscopy done in 2012      Memory loss     pt states worse with last surgery      TMJ arthralgia      Family History   Problem Relation Age of Onset     Cardiovascular Father      Hypertension Father      Hyperlipidemia Father      Alzheimer Disease Mother      Depression Mother      Genetic Disorder Mother         Alzheimer's symptoms     Cancer - colorectal No family hx of      Breast Cancer No family hx of      Past Surgical History:   Procedure Laterality Date     APPENDECTOMY      during c section     BIOPSY  13    right breast biopsy     BIOPSY  13    right axillary lymph node     BREAST SURGERY  13    right breast partial mastectomy/lumpectomy      SECTION       COLONOSCOPY      She had colonoscopy and endoscopy done on November 15, 2012 for workup of iron deficiency anemia and the results were satisfactory     EYE SURGERY  2014    cataracts     GYN SURGERY           HEAD & NECK SURGERY      surgery for Trigeminal neuralgia       HYSTERECTOMY TOTAL ABDOMINAL, BILATERAL SALPINGO-OOPHORECTOMY, COMBINED       LAPAROSCOPIC HERNIORRHAPHY INCISIONAL N/A 4/10/2017    Procedure: LAPAROSCOPIC HERNIORRHAPHY INCISIONAL;  Surgeon: Dayanara Kendall MD;  Location: [facility]     MASTECTOMY PARTIAL WITH SENTINEL NODE   8/20/2013    Procedure: MASTECTOMY PARTIAL WITH SENTINEL NODE;  RIGHT PARTIAL MASTECTOMY WITH RIGHT AXILLARY SENTINEL NODE BIOPSY;  Surgeon: Kae Padilla MD;  Location: [facility]     ORTHOPEDIC SURGERY      Right TKA and Left TKA     Social History     Tobacco Use     Smoking status: Never Smoker     Smokeless tobacco: Never Used   Substance Use Topics     Alcohol use: Yes     Alcohol/week: 1.0 standard drinks     Comment: 1 or 2 glasses of wine per week     Current Outpatient Medications   Medication Sig Dispense Refill     amLODIPine (NORVASC) 2.5 MG tablet Take 1 tablet (2.5 mg) by mouth daily For Blood Pressure 90 tablet 1     Aspirin-Acetaminophen-Caffeine (EXCEDRIN PO) Take 1 tablet by mouth every morning (for morning headache)       donepezil (ARICEPT) 10 MG tablet Take 1 tablet (10 mg) by mouth daily For memory 90 tablet 3     Fexofenadine HCl (ALLEGRA PO) Take 1 tablet by mouth daily       hydrochlorothiazide (HYDRODIURIL) 25 MG tablet Take 1 tablet (25 mg) by mouth daily For Blood Pressure 90 tablet 3     lisinopril (PRINIVIL/ZESTRIL) 40 MG tablet Take 1 tablet (40 mg) by mouth daily For high Blood Pressure 90 tablet 3     pravastatin (PRAVACHOL) 40 MG tablet Take 1 tablet (40 mg) by mouth At Bedtime For high cholestrol 90 tablet 3     senna-docusate (SENOKOT-S/PERICOLACE) 8.6-50 MG tablet Take 1 tablet by mouth 2 times daily as needed for constipation       sulfamethoxazole-trimethoprim (BACTRIM DS/SEPTRA DS) 800-160 MG tablet TAKE HALF (1/2) TABLET BY MOUTH DAILY AS NEEDED AFTER INTERCOURSE TO PREVENT UTI 30 tablet 1     venlafaxine (EFFEXOR XR) 150 MG 24 hr capsule Take 2 capsules (300 mg) by mouth daily 180 capsule 3     Allergies   Allergen Reactions     Seafood Anaphylaxis     Femara [Letrozole] Rash     Latex Rash     WITH LATEX BANDAIDS     Tamoxifen Rash       Reviewed and updated as needed this visit by clinical staff and provider     Review of Systems   Unable to perform ROS: Dementia         BP  "127/82 (BP Location: Right arm, Cuff Size: Adult Regular)   Pulse 114   Temp 98.6  F (37  C) (Tympanic)   Ht 1.626 m (5' 4\")   Wt 64.4 kg (142 lb)   LMP 09/01/2000 (Exact Date)   SpO2 97%   Breastfeeding No   BMI 24.37 kg/m     Physical Exam  Constitutional:       Appearance: She is well-developed.   HENT:      Head: Normocephalic.   Cardiovascular:      Rate and Rhythm: Regular rhythm.      Comments: HR decreased when lying for exam  Pulmonary:      Effort: Pulmonary effort is normal.   Abdominal:      General: Bowel sounds are normal. There is no distension.      Palpations: Abdomen is soft.      Tenderness: There is no abdominal tenderness.   Neurological:      Mental Status: She is alert.   Psychiatric:      Comments: Nervous. Forgetful         Assessment and Plan:       ICD-10-CM    1. Weight loss R63.4 Comprehensive metabolic panel     CBC with platelets     CANCELED: CT Abdomen Pelvis w Contrast   2. Abdominal pain, generalized R10.84 Comprehensive metabolic panel     CBC with platelets     CANCELED: CT Abdomen Pelvis w Contrast       New weight loss and generalized abd pain. Difficult history d/t dementia. Exam is benign today. Strong concern for malignancy so will eval as above with labs and CT. We discussed ways to try to get more calories in but this is difficult. Recommend she follow-up with pcp in the near future to continue investigating if needed.       CARY Bailey, CNP  [facility]   "

## 2020-01-27 NOTE — RESULT ENCOUNTER NOTE
Ru Ding   Great news! Nothing serious on the CT scan. There are several incidental findings but nothing serious that would be causing the weight loss. Nothing here requires any immediate attention.   There are some gallstones, one kidney stone and a liver cyst that all do not need intervention at this time and appear to not be causing any problem. There is a tiny nodule in the lung which is something we see often. We can do a repeat scan in 1 year to see if that lung nodule has changed but it is not of any concern at this time.   Please make an appointment with Dr Villalobos to see if we can keep investigating and see if we can find a solution to improve your appetite.   Let me know if you have any questions  Emperatriz

## 2020-01-27 NOTE — RESULT ENCOUNTER NOTE
Olivia and Alejandro  All of your lab tests look pretty good. Looks like you are a little dehydrated, however. Please try to drink more fluids. Let me know if you have questions  Emperatriz Porter NP

## 2020-01-28 NOTE — PROGRESS NOTES
Service Date: 2020      Ame Villalobos MD   [address]   [place] [address]      RE: Olivia Rodriguez   MRN: [id number]   : 1945      Dear Dr. Villalobos:      I had the pleasure to see Olivia today in my Neurosurgical Clinic for evaluation of right-sided facial pain.      Briefly, Olivia is a 75-year-old woman who has got a history of trigeminal neuralgia and is status post radiofrequency rhizotomy of the right side 3 years ago.  With that procedure, she became pain-free and has been pain-free until just recently.  She did have recently a couple week bout of recurrent trigeminal pain.  Fortunately, this has resolved completely and she no longer has any pain.      She also has Alzheimer's and it seems like this is fairly progressive.      Overall, I think she is doing well given the fact that her pain has resolved.  I did talk to them about repeating a radiofrequency rhizotomy.  If the pain came back, they would like to proceed with a repeat radiofrequency rhizotomy.  At the moment, though, they are very comfortable with sitting tight without repeating radiofrequency unless the pain comes back.  If the pain comes back they will just call our office and schedule a repeat radiofrequency rhizotomy.  They are aware of the risks and benefits of the procedure.      Sincerely,      Sherry Guerrero MD      Overall, I spent approximately 20 minutes with Olivia with the majority of this time spent in consultation and developing a treatment plan.         SHERRY GUERRERO MD             D: 2020   T: 2020   MT: AKCARLOS      Name:     OLIVIA RODRIGUEZ   MRN:      [id number]        Account:      [id number]   :      1945           Service Date: 2020      Document: [id number]

## 2020-01-28 NOTE — PATIENT INSTRUCTIONS
Follow up with Dr Casper as needed for Right sided facial pain.    If Right sided facial pain returns, a procedure can be scheduled with Dr Casper   Radiofrequency Rhizotomy    Call Priscilla RN Care Coordinator for questions/concerns     Thank you for using [facility]

## 2020-01-28 NOTE — LETTER
RE: Olivia Zhou  [address]  [place] [address]     Dear Colleague,    Thank you for referring your patient, Olivia Zhou, to the [facility]GERY at [facility]. Please see a copy of my visit note below.    Service Date: 2020      Ame Villalobos MD   [facility]    [address]   [place] [address]      RE: Olivia Zhou   MRN: [id number]   : 1945      Dear Dr. Villalobos:      I had the pleasure to see Olivia today in my Neurosurgical Clinic for evaluation of right-sided facial pain.      Briefly, Olivia is a 75-year-old woman who has got a history of trigeminal neuralgia and is status post radiofrequency rhizotomy of the right side 3 years ago.  With that procedure, she became pain-free and has been pain-free until just recently.  She did have recently a couple week bout of recurrent trigeminal pain.  Fortunately, this has resolved completely and she no longer has any pain.      She also has Alzheimer's and it seems like this is fairly progressive.      Overall, I think she is doing well given the fact that her pain has resolved.  I did talk to them about repeating a radiofrequency rhizotomy.  If the pain came back, they would like to proceed with a repeat radiofrequency rhizotomy.  At the moment, though, they are very comfortable with sitting tight without repeating radiofrequency unless the pain comes back.  If the pain comes back they will just call our office and schedule a repeat radiofrequency rhizotomy.  They are aware of the risks and benefits of the procedure.      Sincerely,      Tong Casper MD      Overall, I spent approximately 20 minutes with Olivia with the majority of this time spent in consultation and developing a treatment plan.      D: 2020   T: 2020   MT: AKCARLOS      Name:     OLIVIA ZHOU   MRN:      [id number]        Account:      [id number]   :      1945            Service Date: 01/28/2020      Document: [id number]

## 2020-01-29 NOTE — PROGRESS NOTES
MEMORY EVALUATION CLINIC - FOLLOWUP    INTERVAL HISTORY: Olivia is here today in follow up of advanced dementia accompanied by her  Kevin. They live at [facility] and Kevin provides all care. Kids are involved and helpful. He talks about being glad he can provide care for her. Her dementia has continued to progress and she is sleeping up to 18 hours per day because that is where she is most comfortable. Is still on Effexor chronically but trial off of Lexapro occurred several months ago. No longer going out to lunch as doesn't find enjoyment in that anymore. She doesn't admit to depression when I ask her about it today but historically has significant depression. They are willing to re-trial Lexapro 5 mg which worked well in the past.     She also unfortunately had recurrence of trigeminal neuralgia with jabbing facial pain this winter. It was likely too painful to chew and talk and she lost a lot of weight. Did f/u with neurosurgeon Dr. Casper who had performed prior radiofrequency rhizotomy on the right a few years ago with success. The pain resolved on its own this time so they are thankful of that. She had basic labs and CT unremarkable for cause of weight loss. Now has gotten very weak and deconditioned. Needs help with bathing and dressing and is fully dependent on IADLs. Will restart going down to 1st floor [facility] memory care groups periodically for stimulation and to give Kevin some respite.      REVIEW OF SYSTEMS: Detailed as above       OBJECTIVE:  /65   Pulse 100   Wt 65.8 kg (145 lb 1.6 oz)   LMP 09/01/2000 (Exact Date)   SpO2 96%   BMI 24.91 kg/m    Looks thinner and deconditioned, frail  No odor but slightly unkempt hair  Nervous picking at fingernails  Looks comfortable and in no acute distress  Looks to  to do much of the talking    COGNITIVE MEDICATIONS: Donepezil and Effexor; restarting Lexapro again today     PRIOR TESTING: [facility] 8/30 June 2018      ASSESSMENT/PLAN: Olivia  is here in follow up of her dementia, likely of the Alzheimer's type with comorbid long-standing depression as well as family h/o Alzheimer's disease in her mother. She recovered from recurrent trigeminal neuralgia pain this winter but is now more frail and has lost weight. Is also more depressed it seems and anxious and sleeping a lot. Basic labs and imaging completed through primary care unremarkable. Weight loss is likely combo of trigeminal neuralgia, mental health and advancing dementia. Glad pain resolved. Willing to restart Lexapro which worked well in the past for depression and anxiety. Continue long-standing Effexor. Her blood pressure was also quite low and she has been dizzy likely r/t weight loss so discussed stopping Amlodipine.       Patient Instructions   Restart Lexapro for mood which worked well in the past    Try to increase oral intake and activity    If you continue to have weight loss please let me and/or Dr. Villalobos know    Stop Amlodipine due to low blood pressure    Follow up in about 3 months (Priti to call you)        MDM: 30 minutes (3:30 PM - 4:00 PM) F2F time spent with patient/, over 50% time counseling, coordinating care and explaining about nature of the patient's conditions.    Hilary Ledezma, DO  Internal Medicine and Geriatrics

## 2020-01-29 NOTE — PATIENT INSTRUCTIONS
Restart Lexapro for mood which worked well in the past    Try to increase oral intake and activity    If you continue to have weight loss please let me and/or Dr. Villalobos know    Stop Amlodipine due to low blood pressure    Follow up in about 3 months (Priti to call you)

## 2020-02-10 NOTE — ED PROVIDER NOTES
History     Chief Complaint:  Poor Appetite and Weight Loss    The history is provided by the patient and the spouse.      Olivia Rodriguez is a 75 year old female, with history significant for Alzheimer's dementia, hypertension, and hyperlipidemia, who presents with concerns for poor appetite and weight loss. Patient s spouse states she has declined to eat anything over the past four days and comments on concerning lack of appetite over the past two months. Notably,  reports she is approximately one month s/p trigeminal myalgia exacerbation and has been complaining of difficulty swallowing and speaking secondary to pain since. She has lost 30 pounds recently and has been lying in bed at baseline. Today, patient exhibited significant dizziness after waking up and was also complaining of abdominal pain. She denies any ongoing abdominal pain or vomiting but notes she feels like she  has the flu.  Olivia is followed by Dr. Harrington of Neurology at [facility] for her trigeminal neuralgia.     Allergies:  Seafood  Femara  Latex  Tamoxifen     Medications:    Aricept  Lexapro  Lisinopril  Hydrochlorothiazide   Pravachol  Effexor    Past Medical History:    Anxiety  Malignant neoplasm of breast  TMJ  Hypertension  Coronary artery calcification  Anemia  Trigeminal neuralgia  Arthritis  Dementia  Alzheimer's   Hyperlipidemia    Past Surgical History:    Appendectomy during c section   x2  HEATHER, BSO  Cataracts  Right and left TKA  Surgery for Trigeminal neuralgia   Right partial mastectomy     Family History:    CV disease  HTN  HLD  Alzheimer  Depression    Social History:  Accompanied by .  Never Smoker  Alcohol Use: Positive  Marital Status:       Review of Systems   Constitutional: Positive for activity change, appetite change and unexpected weight change.   Gastrointestinal: Positive for abdominal pain. Negative for vomiting.   Neurological: Positive for dizziness.   All other  systems reviewed and are negative.    Physical Exam   Patient Vitals for the past 24 hrs:   BP Temp Temp src Pulse Heart Rate Resp SpO2   02/10/20 1414 114/74 98.4  F (36.9  C) Oral 113 113 14 97 %     Physical Exam  General: Resting on the gurney, appears minimally uncomfortable. Clothes are loose fitting.   Head:  The scalp, face, and head appear normal  Mouth/Throat: Mucus membranes are slightly dry  CV:  Regular rate    Normal S1 and S2  No pathological murmur   Resp:  Breath sounds clear and equal bilaterally    Non-labored, no retractions or accessory muscle use    No coarseness    No wheezing   GI:  Abdomen is soft, no rigidity    Focal left lower quadrant tenderness to palpation, no guarding or rebound.   MS:  Normal motor assessment of all extremities.    Good capillary refill noted.  Skin:  No rash or lesions noted.  Neuro:   Speech is normal and fluent. No apparent deficit.  Psych: Awake. Alert.  Normal affect.      Appropriate interactions.    Emergency Department Course     Imaging:  CT Abdomen Pelvis w Contrast  Trace pelvic free fluid which is nonspecific and new since the comparison study, otherwise no acute process demonstrated in the abdomen and pelvis.   ALISTAIR AGUERO MD  Reading per radiology    Laboratory:  CBC: WBC 7.1, HGB 15.9 (H),   CMP: glc 136 (H) creatinine 1.13 (H) GFR 39 (L) o/w WNL     Lipase: 443 (H)    Lactic Acid (Resulted: 1625): 2.2 (H)  Blood culture x2: Pending    Interventions:  1602: NS 1L IV Bolus     Emergency Department Course:  Nursing notes and vitals reviewed.  1545 I performed an exam of the patient as documented above.   1603 IV was inserted and blood was drawn for laboratory testing, results above.  1746 The patient was sent for a CT while in the emergency department, results above.   1946 Patient rechecked and updated. Findings and plan explained to the patient. Patient discharged home with instructions regarding supportive care, medications, and reasons to  return. The importance of close follow-up was reviewed.     Impression & Plan      Medical Decision Making:  Olivia Rodriguez is a 75 year old female who presents for evaluation of weight loss.  Associated symptoms today have included abdominal pain and poor appetite. The workup here in the emergency room was to evaluate for infections, metabolic, electrolyte, neurologic or cardiovascular causes.  Of note, there are no signs of pneumonia or UTI causing the symptoms.  In addition, I do not believe symptoms are due to CVA, TIA, myesthesia gravis, myopathy or acute coronary syndromes and there are no indications at this point for a further workup with a benign history, exam and laboratory tests.  The patient reports a hx of trigeminal neuralgia, which is now improved, but she and her  believe this has created a negative feedback response to eating as it had been so painfula nd she no longer recognizes hunger as she used to.  We discussed the importance of eating and suggested nutrient and calorically dense foods.    They were ambulated in ED and did well.  I believe supportive outpatient management is indicated; will have them followup with their primary care doctor in 1-2 days for a recheck. Return for any additional symptoms or worsening weakness.       Diagnosis:    ICD-10-CM   1. Weight loss R63.4   2. Poor appetite R63.0     Disposition: discharged to home    Scribe Disclosure:   I, Omari Saldaña, am serving as a scribe at 3:51 PM on 2/10/2020 to document services personally performed by Gerri Lou MD based on my observations and the provider's statements to me.      EMERGENCY DEPARTMENT       Gerri Lou MD  02/20/20 0872

## 2020-02-10 NOTE — ED AVS SNAPSHOT
Emergency Department  [address] [facility] [address]  Phone:  [phone]  Fax:  [phone]                                    Olivia Rodriguez   MRN: [id number]    Department:   Emergency Department   Date of Visit:  2/10/2020           After Visit Summary Signature Page    I have received my discharge instructions, and my questions have been answered. I have discussed any challenges I see with this plan with the nurse or doctor.    ..........................................................................................................................................  Patient/Patient Representative Signature      ..........................................................................................................................................  Patient Representative Print Name and Relationship to Patient    ..................................................               ................................................  Date                                   Time    ..........................................................................................................................................  Reviewed by Signature/Title    ...................................................              ..............................................  Date                                               Time          22EPIC Rev 08/18

## 2020-02-10 NOTE — ED TRIAGE NOTES
Patient reports nausea and no appetite for 2 months. Has alzheimers diagnosis. Patent's  has seen a rapid decline in the last few months.

## 2020-02-11 NOTE — DISCHARGE INSTRUCTIONS
Return for abdominal pain, fever, vomiting, dehydration, or anything worrisome to you.    If you feel your condition has changed or worsened, please call your doctor or return to the emergency department right away.

## 2020-02-25 PROBLEM — N18.30 CKD (CHRONIC KIDNEY DISEASE) STAGE 3, GFR 30-59 ML/MIN (H): Status: ACTIVE | Noted: 2020-01-01

## 2020-02-25 PROBLEM — K21.9 GASTROESOPHAGEAL REFLUX DISEASE WITHOUT ESOPHAGITIS: Status: ACTIVE | Noted: 2020-01-01

## 2020-02-25 NOTE — PATIENT INSTRUCTIONS
Try ensure drink or boost or whey protein with leucine   Encourage fluid intake   Discontinue hydrochlorothiazide  Hold Aricept for the time being  Hold lisinopril if SBP is less than 100  Start pantoprazole 20 mg daily  It is ok to hold pravastatin if patient refuses as it is not that important at this point  Follow up in one week

## 2020-02-25 NOTE — Clinical Note
Gladys Haydenily,Her dementia is progressing .She is losing weight.Aricept is not helping much so I discontinued Aricept.I want to see if that improves appetite.We again discussed about doing symptomatic treatment .I think symptomatic treatment only would be best for patient whose dementia is progressing .Agreed to avoid any aggressive intervention.Thanks Ame

## 2020-02-25 NOTE — PROGRESS NOTES
Subjective     Olivia Rodriguez is a 75 year old female who presents to clinic today for the following health issues:    She is accompanied by her  who helps give most of patient history.    HPI   ED/UC Followup:    Facility:   Emergency Department  Date of visit: 2/10/2020  Reason for visit: Weight Loss, poor appetite  Current Status: Still has a poor appetite - weight is down another 13 lbs since last recorded weight.    Presented to ED for weight loss and poor appetite on 02/10/2020  The workup here in the emergency room was to evaluate for infections, metabolic, electrolyte, neurologic or cardiovascular causes  There were no signs of pneumonia or UTI causing the symptoms  Patient reports a hx of trigeminal neuralgia, which is now improved, but she and her  believe this has created a negative feedback response to eating as it had been so painful and she no longer recognizes hunger as she used to.  They discussed the importance of eating and suggested nutrient and calorically dense foods.  ED Doctor advised pt to follow up with their primary care doctor in 1-2 days for a recheck  CAT scan done in ED showed no concerning results  Pt has not had an appetite lately per pt's   Pt's  is noticing that she is getting very weak and dizzy lately  Her physical activity has significantly decreased per pt's   Pt's  does not know what to do to help pt eat  It is painful for her to eat and talk per pt's   Pt's  has not noticed the aricept helping her as much    Sore under eye  She has a sore underneath her eye which is bothersome  Pt picks at the scab a lot per pt's               Patient Active Problem List   Diagnosis     Major depressive disorder, recurrent episode, moderate (H)     TMJ (temporomandibular joint syndrome)     S/P total knee arthroplasty     S/P HEATHER-BSO (total abdominal hysterectomy and bilateral salpingo-oophorectomy)     Sensation of feeling cold      Health Care Home     Anxiety     Controlled substance agreement signed     Essential hypertension     Coronary artery calcification     Lung nodule     Trigeminal neuralgia     Status post total bilateral knee replacement     Incisional hernia     Dementia without behavioral disturbance, unspecified dementia type (H)     Personal history of malignant neoplasm of breast     Mixed hyperlipidemia     CKD (chronic kidney disease) stage 3, GFR 30-59 ml/min (H)     Past Surgical History:   Procedure Laterality Date     APPENDECTOMY      during c section     BIOPSY  13    right breast biopsy     BIOPSY  13    right axillary lymph node     BREAST SURGERY  13    right breast partial mastectomy/lumpectomy      SECTION       COLONOSCOPY      She had colonoscopy and endoscopy done on November 15, 2012 for workup of iron deficiency anemia and the results were satisfactory     EYE SURGERY  2014    cataracts     GYN SURGERY           HEAD & NECK SURGERY      surgery for Trigeminal neuralgia       HYSTERECTOMY TOTAL ABDOMINAL, BILATERAL SALPINGO-OOPHORECTOMY, COMBINED       LAPAROSCOPIC HERNIORRHAPHY INCISIONAL N/A 4/10/2017    Procedure: LAPAROSCOPIC HERNIORRHAPHY INCISIONAL;  Surgeon: Dayanara Kendall MD;  Location: [facility]     MASTECTOMY PARTIAL WITH SENTINEL NODE  2013    Procedure: MASTECTOMY PARTIAL WITH SENTINEL NODE;  RIGHT PARTIAL MASTECTOMY WITH RIGHT AXILLARY SENTINEL NODE BIOPSY;  Surgeon: Kae Padilla MD;  Location: [facility]     ORTHOPEDIC SURGERY      Right TKA and Left TKA       Social History     Tobacco Use     Smoking status: Never Smoker     Smokeless tobacco: Never Used   Substance Use Topics     Alcohol use: Yes     Alcohol/week: 1.0 standard drinks     Comment: 1 or 2 glasses of wine per week     Family History   Problem Relation Age of Onset     Cardiovascular Father      Hypertension Father      Hyperlipidemia Father      Alzheimer Disease Mother       Depression Mother      Genetic Disorder Mother         Alzheimer's symptoms     Cancer - colorectal No family hx of      Breast Cancer No family hx of            Current Outpatient Medications   Medication Sig Dispense Refill     Aspirin-Acetaminophen-Caffeine (EXCEDRIN PO) Take 1 tablet by mouth every morning (for morning headache)       cholecalciferol (VITAMIN D3) 1000 units (25 mcg) capsule Take 1 capsule by mouth daily       donepezil (ARICEPT) 10 MG tablet Take 1 tablet (10 mg) by mouth daily For memory 90 tablet 3     escitalopram (LEXAPRO) 5 MG tablet Take 1 tablet (5 mg) by mouth daily 90 tablet 3     Fexofenadine HCl (ALLEGRA PO) Take 1 tablet by mouth daily       hydrochlorothiazide (HYDRODIURIL) 25 MG tablet Take 1 tablet (25 mg) by mouth daily For Blood Pressure 90 tablet 3     lisinopril (PRINIVIL/ZESTRIL) 40 MG tablet Take 1 tablet (40 mg) by mouth daily For high Blood Pressure 90 tablet 3     pravastatin (PRAVACHOL) 40 MG tablet Take 1 tablet (40 mg) by mouth At Bedtime For high cholestrol 90 tablet 3     senna-docusate (SENOKOT-S/PERICOLACE) 8.6-50 MG tablet Take 1 tablet by mouth 2 times daily as needed for constipation       sulfamethoxazole-trimethoprim (BACTRIM DS/SEPTRA DS) 800-160 MG tablet TAKE HALF (1/2) TABLET BY MOUTH DAILY AS NEEDED AFTER INTERCOURSE TO PREVENT UTI 30 tablet 1     venlafaxine (EFFEXOR XR) 150 MG 24 hr capsule Take 2 capsules (300 mg) by mouth daily 180 capsule 3     Allergies   Allergen Reactions     Seafood Anaphylaxis     Femara [Letrozole] Rash     Latex Rash     WITH LATEX BANDAIDS     Tamoxifen Rash       Medications and Labs reviewed in EPIC    Reviewed and updated as needed this visit by Provider         Review of Systems   ROS COMP: Constitutional, HEENT, cardiovascular, pulmonary, GI, , musculoskeletal, neuro, skin, endocrine and psych systems are negative, except as otherwise noted.    POSITIVE for dizziness  POSITIVE for dyspnea upon exertion  POSITIVE for  "Alzhiemer's Disease  POSITIVE for weight loss  POSITIVE for poor appetitie    This document serves as a record of the services and decisions personally performed and made by Ame Villalobos MD. It was created on her behalf by Lucie Vickers, a trained medical scribe. The creation of this document is based on the provider's statements to the medical scribe.  Lucie Vickers 2:52 PM February 25, 2020        Objective    BP 93/63 (BP Location: Right arm, Cuff Size: Adult Regular)   Pulse 101   Temp 98.8  F (37.1  C) (Tympanic)   Ht 1.626 m (5' 4\")   Wt 59.4 kg (131 lb)   LMP 09/01/2000 (Exact Date)   SpO2 97%   Breastfeeding No   BMI 22.49 kg/m    Body mass index is 22.49 kg/m .  Physical Exam   GENERAL APPEARANCE: sitting in wheelchair   Standing was making her dizzy  Appears tired   EYES: Eyes grossly normal to inspection, PERRL and conjunctivae and sclerae normal  She has slight sore under right eye brow  HENT: ear canals and TM's normal and nose and mouth without ulcers or lesions  NECK: no adenopathy  RESP: lungs clear to auscultation - no rales, rhonchi or wheezes  CV: regular rates and rhythm, normal S1 S2, no S3  PSYCH: does not speak much due to Alzheimer's Disease       Diagnostic Test Results:  Labs reviewed in Epic  none         Assessment & Plan     Olivia was seen today for fu after er visit.    Diagnoses and all orders for this visit:    Dementia without behavioral disturbance, unspecified dementia type (H)  Pt is here for a follow up from ED admission on 02/10/2020 for weight loss and loss of appetite  There were no signs of infection upon ED Doctor's evaluation  Patient reports a hx of trigeminal neuralgia, which is now improved, but she and her  believe this has created a negative feedback response to eating as it had been so painful and she no longer recognizes hunger as she used to  ED doctor advised pt to see primary care physician for a re-check  The CAT scan done in ED showed no " concerning results  Pt is still losing weight and has no appetite per pt's   He does not know what to do to help her  It is painful for her to eat and talk  Pt's  is noticing that she is getting very weak and dizzy lately  Aricept is not helping as much per pt's   We informed the patient that a side effect of her aricept medication is weight loss plus patient has advanced dementia  Advised to hold aricept for the time being to re-gain appetite  Also advised her to discontinue hydrochlorothiazide because it is making her dehydrated and Blood Pressure is low  Told Pt's  to hold lisinopril if systolic BP is less than 100  Advised pt to try to eat anything she can to gain weight and help improve health  Suggested to pt to add whey powder to drinks to improve caloric intake  Also suggested she try to drink ensure or boost drinks  We prescribed her pantoprazole to soothe her stomach   Told them to follow up with us weekly to monitor her progress    The below discussion was done in separate room not in front of patient to avoid increasing her anxiety  I had discussion with  and explained that patient has dementia which is getting  Worse and this is expected course  Discussed with him what he wants   Does he want symptomatic treatment which is reasonable option  Discussed that let nature take its course as aggressive intervention will not be helpful   Do they want quantity or quality  At this  agreed for symptomatic treatment  He was very appreciative of this discussion.  We also discussed that pravastatin is not that important graciela this point.  Encourage fluids and ensure but if she declines then not force her .  We also discussed hospice care if needed       Dyspepsia  -     pantoprazole (PROTONIX) 20 MG EC tablet; Take 1 tablet (20 mg) by mouth daily  See Above    Gastroesophageal reflux disease without esophagitis  -     pantoprazole (PROTONIX) 20 MG EC tablet; Take 1 tablet (20  mg) by mouth daily  See Above    CKD (chronic kidney disease) stage 3, GFR 30-59 ml/min (H)  Stable    Sore under her eye looks infected upon exam  She can apply triple antibiotic ointment to the affected area as needed     Spoke with her  in a separate room about how aggressive he wants to be with treatment of her advanced dementia. They agreed to do symptomatic treatment instead of an aggressive treatment right now.  See my note above        Patient Instructions   Try ensure drink or boost or whey protein with leucine   Encourage fluid intake   Discontinue hydrochlorothiazide  Hold Aricept for the time being  Hold lisinopril if SBP is less than 100  Start pantoprazole 20 mg daily  It is ok to hold pravastatin if patient refuses as it is not that important at this point  Follow up in one week        The information in this document, created by the medical scribe for me, accurately reflects the services I personally performed and the decisions made by me. I have reviewed and approved this document for accuracy prior to leaving the patient care area.  February 25, 2020 3:22 PM    Ame Villalobos MD  [facility]

## 2020-03-05 NOTE — Clinical Note
Patient has made remarkable improvement since I stopped Aricept and started her on Protonix.She gains about 9 pounds since her last visit.Nausea and dizziness has improved.

## 2020-03-05 NOTE — PROGRESS NOTES
Subjective     Olivia Rodriguez is a 75 year old female who presents to clinic today for the following health issues:    Patient is accompanied by , who helped provide her history    HPI     Weight loss follow-up   Patient is here for 1 week follow up  Dizziness improved as her oral intake improved .  Sometimes feels dizzy first thing in morning when she wakes up  This is somewhat normal for her  Usually sits on edge of bed to let dizziness subside  They went out to eat couple times since last week  Her appetite has improved  Started taking Protonix  Gained 10 lbs since last week    Hypertension  Completely off hydrochlorothiazide, Aricept and amlodipine now  Still has prescriptions of hydrochlorothiazide and amlodipine  Stopped taking lisinopril initially  They restarted this when they noticed her BP was staying on higher side  Takes lisinopril in morning  Brought in home BP readings today  Plans to start walking regime again  Relate she has not been able to walk for last couple months  Denies issues with retaining fluid    History of skin cancer  Patient is concerned about spot on forehead for 1 month  Spot is irritating so she peels, scratches or rubs area  Has history of skin cancer  She has had similar spots on temple and under eye  S/p 2 mohs surgeries 1-2 years ago and 3-4 years ago  Surgery was done by Dr. Perdue, a dermatologist  Notes her cousin also had history of skin cancer  Wondering if she needs to see dermatology    Patient Active Problem List   Diagnosis     Major depressive disorder, recurrent episode, moderate (H)     TMJ (temporomandibular joint syndrome)     S/P total knee arthroplasty     S/P HEATHER-BSO (total abdominal hysterectomy and bilateral salpingo-oophorectomy)     Sensation of feeling cold     Health Care Home     Anxiety     Controlled substance agreement signed     Essential hypertension     Coronary artery calcification     Lung nodule     Trigeminal neuralgia     Status post  total bilateral knee replacement     Incisional hernia     Dementia without behavioral disturbance, unspecified dementia type (H)     Personal history of malignant neoplasm of breast     Mixed hyperlipidemia     CKD (chronic kidney disease) stage 3, GFR 30-59 ml/min (H)     Gastroesophageal reflux disease without esophagitis     Past Surgical History:   Procedure Laterality Date     APPENDECTOMY      during c section     BIOPSY  13    right breast biopsy     BIOPSY  13    right axillary lymph node     BREAST SURGERY  13    right breast partial mastectomy/lumpectomy      SECTION       COLONOSCOPY      She had colonoscopy and endoscopy done on November 15, 2012 for workup of iron deficiency anemia and the results were satisfactory     EYE SURGERY  2014    cataracts     GYN SURGERY           HEAD & NECK SURGERY      surgery for Trigeminal neuralgia       HYSTERECTOMY TOTAL ABDOMINAL, BILATERAL SALPINGO-OOPHORECTOMY, COMBINED       LAPAROSCOPIC HERNIORRHAPHY INCISIONAL N/A 4/10/2017    Procedure: LAPAROSCOPIC HERNIORRHAPHY INCISIONAL;  Surgeon: Dayanara Kendall MD;  Location: [facility]     MASTECTOMY PARTIAL WITH SENTINEL NODE  2013    Procedure: MASTECTOMY PARTIAL WITH SENTINEL NODE;  RIGHT PARTIAL MASTECTOMY WITH RIGHT AXILLARY SENTINEL NODE BIOPSY;  Surgeon: Kae Padilla MD;  Location: [facility]     ORTHOPEDIC SURGERY      Right TKA and Left TKA       Social History     Tobacco Use     Smoking status: Never Smoker     Smokeless tobacco: Never Used   Substance Use Topics     Alcohol use: Yes     Alcohol/week: 1.0 standard drinks     Comment: 1 or 2 glasses of wine per week     Family History   Problem Relation Age of Onset     Cardiovascular Father      Hypertension Father      Hyperlipidemia Father      Alzheimer Disease Mother      Depression Mother      Genetic Disorder Mother         Alzheimer's symptoms     Cancer - colorectal No family hx of      Breast Cancer No  family hx of            Current Outpatient Medications   Medication Sig Dispense Refill     Aspirin-Acetaminophen-Caffeine (EXCEDRIN PO) Take 1 tablet by mouth every morning (for morning headache)       cholecalciferol (VITAMIN D3) 1000 units (25 mcg) capsule Take 1 capsule by mouth daily       donepezil (ARICEPT) 10 MG tablet Take 1 tablet (10 mg) by mouth daily For memory 90 tablet 3     escitalopram (LEXAPRO) 5 MG tablet Take 1 tablet (5 mg) by mouth daily 90 tablet 3     Fexofenadine HCl (ALLEGRA PO) Take 1 tablet by mouth daily       lisinopril (PRINIVIL/ZESTRIL) 40 MG tablet Take 1 tablet (40 mg) by mouth daily For high Blood Pressure 90 tablet 3     pantoprazole (PROTONIX) 20 MG EC tablet Take 1 tablet (20 mg) by mouth daily 90 tablet 1     pravastatin (PRAVACHOL) 40 MG tablet Take 1 tablet (40 mg) by mouth At Bedtime For high cholestrol 90 tablet 3     senna-docusate (SENOKOT-S/PERICOLACE) 8.6-50 MG tablet Take 1 tablet by mouth 2 times daily as needed for constipation       sulfamethoxazole-trimethoprim (BACTRIM DS/SEPTRA DS) 800-160 MG tablet TAKE HALF (1/2) TABLET BY MOUTH DAILY AS NEEDED AFTER INTERCOURSE TO PREVENT UTI 30 tablet 1     venlafaxine (EFFEXOR XR) 150 MG 24 hr capsule Take 2 capsules (300 mg) by mouth daily 180 capsule 3     Allergies   Allergen Reactions     Seafood Anaphylaxis     Femara [Letrozole] Rash     Latex Rash     WITH LATEX BANDAIDS     Tamoxifen Rash       Medications and Labs reviewed in EPIC    Reviewed and updated as needed this visit by Provider         Review of Systems   ROS COMP: Constitutional, HEENT, cardiovascular, pulmonary, GI, , musculoskeletal, neuro, skin, endocrine and psych systems are negative, except as otherwise noted.    POSITIVE for lesion on forehead    This document serves as a record of the services and decisions personally performed and made by Ame Villalobos MD. It was created on her behalf by Betty Diaz, a trained medical scribe. The creation of this  "document is based on the provider's statements to the medical scribe.  Betty Diaz 12:47 PM March 5, 2020        Objective    /70 (BP Location: Right arm, Cuff Size: Adult Regular)   Pulse 98   Temp 98.7  F (37.1  C) (Tympanic)   Ht 1.626 m (5' 4\")   Wt 63.5 kg (139 lb 14.4 oz)   LMP 09/01/2000 (Exact Date)   SpO2 98%   Breastfeeding No   BMI 24.01 kg/m    Body mass index is 24.01 kg/m .  Physical Exam   GENERAL: healthy, alert and no distress  SKIN: no suspicious lesions or rashes  PSYCH: mentation appears normal, affect normal/bright    Diagnostic Test Results:  Labs reviewed in Epic  none         Assessment & Plan   Olivia was seen today for recheck medication.    Diagnoses and all orders for this visit:    Dementia without behavioral disturbance, unspecified dementia type (H)  Stopped taking Aricept that helped to bring her appetite back  She has appointment to see Hilary Ledezma in 1 month  She will see me in 3 months    Gastroesophageal reflux disease without esophagitis  See below  Appetite improved with taking Protonix    Weight loss  Patient's dizziness is improved  Sometimes gets dizziness in morning when she wakes up  Started Protonix  Found her appetite improved  Has gained 10 lbs since last visit  Patient seems to be doing better  Hopefully she will continue to improve    Essential hypertension  Stopped hydrochlorothiazide and amlodipine per recommendation as blood pressure was low  Only taking lisinopril for BP  She is planning to start walking again  Notes no issues with fluid retention  Home BP readings look satisfactory  Hoping BP will continue to improve  Take lisinopril at night so she does not feel dizzy in morning  If needed can lower dose of lisinopril  If BP is consistently on higher side, have amlodipine or hydrochlorothiazide if needed    Facial skin lesion  She has spot on forehead for 1 month  It is irritating so she tends to pick at it  Has history of skin cancer  S/p 2 mohs " procedure in past by Dr. Perdue  Cousin also had history of skin cancer  Advised she schedule appointment to see Dr. Perdue again for skin exam  Okay to use bacitracin or other antibiotic ointment  Comments:  on forehead    History of skin cancer  See above    Patient Instructions   I refilled your prescriptions  Continue with lisinopril  Take lisinopril at night so she does not feel dizzy in morning  If needed can lower the dose   Monitor your blood pressure once a week at home.  Bring those readings on your next visit.  Notify us if your blood pressure readings consistently stays greater than 140/90.    Schedule an appointment for dermatology for spot on forehead    Follow-up in 3 months  Seek sooner medical attention if there is any worsening of symptoms or problems    The information in this document, created by the medical scribe for me, accurately reflects the services I personally performed and the decisions made by me. I have reviewed and approved this document for accuracy prior to leaving the patient care area.  March 5, 2020 1:04 PM    Ame Villalobos MD  [facility]

## 2020-03-05 NOTE — PATIENT INSTRUCTIONS
I refilled your prescriptions  Continue with lisinopril  Take lisinopril at night so she does not feel dizzy in morning  If needed can lower the dose   Monitor your blood pressure once a week at home.  Bring those readings on your next visit.  Notify us if your blood pressure readings consistently stays greater than 140/90.    Schedule an appointment for dermatology for spot on forehead    Follow-up in 3 months  Seek sooner medical attention if there is any worsening of symptoms or problems

## 2020-06-09 NOTE — TELEPHONE ENCOUNTER
Spoke with .  Olivia is now having facial pain for about one month now on the right side that will not resolve.  States eating, drinking, brushing teeth are severe triggers.     requesting Right sided radiofrequency Rhizotomy as discussed at OV Jan 28, 2020.    Will check with Dr Casper and callback.    Voices understanding.

## 2020-06-10 NOTE — TELEPHONE ENCOUNTER
Huddled with PCP    Suggested telephone visit this Friday and keep in person visit Monday    Called pt's  and he agreed. Scheduled telephone visit this Friday    Dutch ANTHONY RN

## 2020-06-10 NOTE — TELEPHONE ENCOUNTER
PCP,    Please see MyChart message below. Pt is experiencing sundowning-  is concerned. They have appt scheduled 6/15. Would you like to wait until then to discuss?    Thank you,  Dutch ANTHONY RN

## 2020-06-11 NOTE — TELEPHONE ENCOUNTER
Spoke to   Right Radiofrequency Rhizotomy for Trigeminal Neuralgia by Dr CARLOS Casper scheduled for 6/23/20 @ 11am,  Arrival time 9am.  Patient requests PCP complete pre op [facility] Form mailed in surgery packet.    [facility] Testing call center to call patient for testing within 72 hours prior to procedure.

## 2020-06-11 NOTE — TELEPHONE ENCOUNTER
Per Dr CARLOS Casper, please schedule Right Radiofrequency Rhizotomy at first available opening.      Call to , left message please call Priscilla

## 2020-06-12 NOTE — PATIENT INSTRUCTIONS
We can do pre-op physical on next appointment on 6/15  Please make appointment with Dr.Emily Ledezma  Use seroquel 25 mg half tablet as needed for agitation  Will see you on Monday  Seek sooner medical attention if there is any worsening of symptoms or problems.

## 2020-06-12 NOTE — Clinical Note
Gladys Richard,  I have prescribed Seroquel prn for agitation.  Is that ok to use?  Her demential is getting worse and having delusions and hallucinations

## 2020-06-12 NOTE — PROGRESS NOTES
"Olivia Rodriguez is a 75 year old female who is being evaluated via a billable telephone visit.      The patient has been notified of following:     \"This telephone visit will be conducted via a call between you and your physician/provider. We have found that certain health care needs can be provided without the need for a physical exam.  This service lets us provide the care you need with a short phone conversation.  If a prescription is necessary we can send it directly to your pharmacy.  If lab work is needed we can place an order for that and you can then stop by our lab to have the test done at a later time.    Telephone visits are billed at different rates depending on your insurance coverage. During this emergency period, for some insurers they may be billed the same as an in-person visit.  Please reach out to your insurance provider with any questions.    If during the course of the call the physician/provider feels a telephone visit is not appropriate, you will not be charged for this service.\"    Patient has given verbal consent for Telephone visit?  Yes    What phone number would you like to be contacted at? [phone] (Alejandro,  will be talking C2C on file)    How would you like to obtain your AVS? Amando    Subjective     Olivia Rodriguez is a 75 year old female who presents via phone visit today for the following health issues:    HPI  Sundowning -- sleeping 12 hours per day, has lost 30 lbs, eating very little   Patient was with her  as patient has dementia  Having episodes of agitatation and confusion  Sees her parents   Wants to adopt child  Episodes of Hallucinations and delusions   Lost weight   She has trigeminal neuralgia  Going to have surgery in near future for trigeminal neuralgia  As pending pain control will help her  At times she gets so agitated and confused and starts complaining per  report  She lives at senior living and not allowed to go out due to pandemic  She " does not like that      Patient Active Problem List   Diagnosis     Major depressive disorder, recurrent episode, moderate (H)     TMJ (temporomandibular joint syndrome)     S/P total knee arthroplasty     S/P HEATHER-BSO (total abdominal hysterectomy and bilateral salpingo-oophorectomy)     Sensation of feeling cold     Health Care Home     Anxiety     Controlled substance agreement signed     Essential hypertension     Coronary artery calcification     Lung nodule     Trigeminal neuralgia     Status post total bilateral knee replacement     Incisional hernia     Dementia without behavioral disturbance, unspecified dementia type (H)     Personal history of malignant neoplasm of breast     Mixed hyperlipidemia     CKD (chronic kidney disease) stage 3, GFR 30-59 ml/min (H)     Gastroesophageal reflux disease without esophagitis     Past Surgical History:   Procedure Laterality Date     APPENDECTOMY      during c section     BIOPSY  13    right breast biopsy     BIOPSY  13    right axillary lymph node     BREAST SURGERY  13    right breast partial mastectomy/lumpectomy      SECTION       COLONOSCOPY      She had colonoscopy and endoscopy done on November 15, 2012 for workup of iron deficiency anemia and the results were satisfactory     EYE SURGERY  2014    cataracts     GYN SURGERY           HEAD & NECK SURGERY      surgery for Trigeminal neuralgia       HYSTERECTOMY TOTAL ABDOMINAL, BILATERAL SALPINGO-OOPHORECTOMY, COMBINED       LAPAROSCOPIC HERNIORRHAPHY INCISIONAL N/A 4/10/2017    Procedure: LAPAROSCOPIC HERNIORRHAPHY INCISIONAL;  Surgeon: Dayanara Kendall MD;  Location: [facility]     MASTECTOMY PARTIAL WITH SENTINEL NODE  2013    Procedure: MASTECTOMY PARTIAL WITH SENTINEL NODE;  RIGHT PARTIAL MASTECTOMY WITH RIGHT AXILLARY SENTINEL NODE BIOPSY;  Surgeon: Kae Padilla MD;  Location: [facility]     ORTHOPEDIC SURGERY      Right TKA and Left TKA       Social History      Tobacco Use     Smoking status: Never Smoker     Smokeless tobacco: Never Used   Substance Use Topics     Alcohol use: Yes     Alcohol/week: 1.0 standard drinks     Comment: 1 or 2 glasses of wine a week     Family History   Problem Relation Age of Onset     Cardiovascular Father      Hypertension Father      Hyperlipidemia Father      Alzheimer Disease Mother      Depression Mother      Genetic Disorder Mother         Alzheimer's symptoms     Cancer - colorectal No family hx of      Breast Cancer No family hx of          Current Outpatient Medications   Medication Sig Dispense Refill     Aspirin-Acetaminophen-Caffeine (EXCEDRIN PO) Take 1 tablet by mouth every morning (for morning headache)       cholecalciferol (VITAMIN D3) 1000 units (25 mcg) capsule Take 1 capsule by mouth daily       escitalopram (LEXAPRO) 5 MG tablet Take 1 tablet (5 mg) by mouth daily 90 tablet 3     Fexofenadine HCl (ALLEGRA PO) Take 1 tablet by mouth daily       lisinopril (PRINIVIL/ZESTRIL) 40 MG tablet Take 1 tablet (40 mg) by mouth daily For high Blood Pressure 90 tablet 3     pantoprazole (PROTONIX) 20 MG EC tablet Take 1 tablet (20 mg) by mouth daily 90 tablet 1     pravastatin (PRAVACHOL) 40 MG tablet Take 1 tablet (40 mg) by mouth At Bedtime For high cholestrol 90 tablet 3     QUEtiapine (SEROQUEL) 25 MG tablet Take 0.5 tablets (12.5 mg) by mouth 2 times daily as needed 20 tablet 1     senna-docusate (SENOKOT-S/PERICOLACE) 8.6-50 MG tablet Take 1 tablet by mouth 2 times daily as needed for constipation       sulfamethoxazole-trimethoprim (BACTRIM DS/SEPTRA DS) 800-160 MG tablet TAKE HALF (1/2) TABLET BY MOUTH DAILY AS NEEDED AFTER INTERCOURSE TO PREVENT UTI 30 tablet 1     venlafaxine (EFFEXOR XR) 150 MG 24 hr capsule Take 2 capsules (300 mg) by mouth daily 180 capsule 3       Reviewed and updated as needed this visit by Provider         Review of Systems   Constitutional, HEENT, cardiovascular, pulmonary, GI, ,  musculoskeletal, neuro, skin, endocrine and psych systems are negative, except as otherwise noted.       Objective   Reported vitals:  LMP 09/01/2000 (Exact Date)    Communication was with her  as patient has dementia    Diagnostic Test Results:  Labs reviewed in Epic          Assessment/Plan:  1. Agitation  She has underlying dementia and getting confused and agitated  Dementia is getting worse  I advised him to make an appointment with her geriatrician Dr. Hilary Ledezma  - QUEtiapine (SEROQUEL) 25 MG tablet; Take 0.5 tablets (12.5 mg) by mouth 2 times daily as needed  Dispense: 20 tablet; Refill: 1  Prescribe low-dose Seroquel to help agitation  She has appointment on Monday for preop and will do the work-up to make sure there is no other reason  We will do the work-up to rule out any infection    2. Trigeminal neuralgia  She had surgery in the past which worked but her symptoms are coming back so repeat surgery will be done    3. Late onset Alzheimer's disease with behavioral disturbance (H)  She is followed by the memory clinic and I advised her at them to make the appointment    I had a lengthy discussion with patient's  that her dementia is progressing and her prognosis is fair  He understands      Return in about 3 days (around 6/15/2020) for preop.      Phone call duration:  11  minutes    Ame Villalobos MD

## 2020-06-15 NOTE — PROGRESS NOTES
[address] [address]  [phone]  Dept: [phone]    PRE-OP EVALUATION:  Today's date: 6/15/2020    Olivia Rodriguez (: 1945) presents for pre-operative evaluation assessment as requested by Tong Zambrano MD.  She requires evaluation and anesthesia risk assessment prior to undergoing surgery/procedure for treatment of t trigeminal neuralgia.    Proposed Surgery/ Procedure: ANESTHESIA OUT OF OR Right side radio frequency rhizotomy @1100  Date of Surgery/ Procedure: 2020  Time of Surgery/ Procedure: 11:00 am  Hospital/Surgical Facility:  OR  Fax number for surgical facility: [facility]  Primary Physician: Ame Villalobos  Type of Anesthesia Anticipated: Local with MAC    Patient has a Health Care Directive or Living Will:  YES - in Epic     1. NO - Do you have a history of heart attack, stroke, stent, bypass or surgery on an artery in the head, neck, heart or legs?  2. NO - Do you ever have any pain or discomfort in your chest?  3. NO - Do you have a history of  Heart Failure?  4. NO - Are you troubled by shortness of breath when: walking on the level, up a slight hill or at night?  5. NO - Do you currently have a cold, bronchitis or other respiratory infection?  6. NO - Do you have a cough, shortness of breath or wheezing?  7. NO - Do you sometimes get pains in the calves of your legs when you walk?  8. NO - Do you or anyone in your family have previous history of blood clots?  9. NO - Do you or does anyone in your family have a serious bleeding problem such as prolonged bleeding following surgeries or cuts?  10. NO - Have you ever had problems with anemia or been told to take iron pills?  11. NO - Have you had any abnormal blood loss such as black, tarry or bloody stools, or abnormal vaginal bleeding?  12. NO - Have you ever had a blood transfusion?  13. NO - Have you or any of your relatives ever had problems with anesthesia?  14. YES - DO  YOU HAVE SLEEP APNEA, EXCESSIVE SNORING OR DAYTIME DROWSINESS?   15. NO - Do you have any prosthetic heart valves?  16. YES - DO YOU HAVE PROSTHETIC JOINTS? Bilateral knee replacement   17. NO - Is there any chance that you may be pregnant?      HPI:     HPI related to upcoming procedure: Patient has history of trigeminal neuralgia and had radiofrequency ablation in the past  which worked  Symptoms are coming back again so she is going to have another radiofrequency ablation.  Patient lives with her  who is a caregiver  Patient has dementia which is getting progressively worse  Most of the communication was with her  as patient's memory is fair to poor.  Due to the pain swallowing and chewing is affected  She has been losing weight slowly  Patient gets episodes of agitation and restlessness at times.  We have prescribed Seroquel to be used on as-needed basis  Which was prescribed recently and per her  he did not use it yet as she remained calm        See problem list for active medical problems.  Problems all longstanding and stable, except as noted/documented.  See ROS for pertinent symptoms related to these conditions.    She has allergy to latex   MEDICAL HISTORY:     Patient Active Problem List    Diagnosis Date Noted     CKD (chronic kidney disease) stage 3, GFR 30-59 ml/min (H) 02/25/2020     Priority: Medium     Gastroesophageal reflux disease without esophagitis 02/25/2020     Priority: Medium     Personal history of malignant neoplasm of breast 09/28/2018     Priority: Medium     Upper outer quadrant of right female breast. Follows [facility] on cology       Mixed hyperlipidemia 09/28/2018     Priority: Medium     Dementia without behavioral disturbance, unspecified dementia type (H) 07/07/2018     Priority: Medium     Incisional hernia 04/10/2017     Priority: Medium     Status post total bilateral knee replacement 03/03/2017     Priority: Medium     Trigeminal neuralgia 02/03/2017      Priority: Medium     Coronary artery calcification 2015     Priority: Medium     seen on CT scan done on 2015       Lung nodule 2015     Priority: Medium     seen on CT scan done on 2015       Essential hypertension 10/08/2015     Priority: Medium     Controlled substance agreement signed 2013     Priority: Medium     Anxiety 2013     Priority: Medium     Health Care Home 2013     Priority: Medium     Tram Mercedes RN-BC  [phone]  FPA / [facility]               Major depressive disorder, recurrent episode, moderate (H) 2013     Priority: Medium     Dr. Ordaz       TMJ (temporomandibular joint syndrome) 2013     Priority: Medium     S/P total knee arthroplasty 2013     Priority: Medium     bilateral       S/P HEATHER-BSO (total abdominal hysterectomy and bilateral salpingo-oophorectomy) 2013     Priority: Medium     Sensation of feeling cold 2013     Priority: Medium      Past Medical History:   Diagnosis Date     Anxiety      Arthritis      Cancer (H) Aug 2013    right breast     Coronary artery calcification      Depressive disorder      Hypertension      Iron deficiency anaemia     Had endoscopy and colonoscopy done in 2012      Memory loss     pt states worse with last surgery      TMJ arthralgia      Past Surgical History:   Procedure Laterality Date     APPENDECTOMY      during c section     BIOPSY  13    right breast biopsy     BIOPSY  13    right axillary lymph node     BREAST SURGERY  13    right breast partial mastectomy/lumpectomy      SECTION       COLONOSCOPY      She had colonoscopy and endoscopy done on November 15, 2012 for workup of iron deficiency anemia and the results were satisfactory     EYE SURGERY  2014    cataracts     GYN SURGERY           HEAD & NECK SURGERY      surgery for Trigeminal neuralgia       HYSTERECTOMY TOTAL ABDOMINAL,  BILATERAL SALPINGO-OOPHORECTOMY, COMBINED  2006     LAPAROSCOPIC HERNIORRHAPHY INCISIONAL N/A 4/10/2017    Procedure: LAPAROSCOPIC HERNIORRHAPHY INCISIONAL;  Surgeon: Dayanara Kendall MD;  Location: [facility]     MASTECTOMY PARTIAL WITH SENTINEL NODE  8/20/2013    Procedure: MASTECTOMY PARTIAL WITH SENTINEL NODE;  RIGHT PARTIAL MASTECTOMY WITH RIGHT AXILLARY SENTINEL NODE BIOPSY;  Surgeon: Kae Padilla MD;  Location: [facility]     ORTHOPEDIC SURGERY      Right TKA and Left TKA     Current Outpatient Medications   Medication Sig Dispense Refill     Aspirin-Acetaminophen-Caffeine (EXCEDRIN PO) Take 1 tablet by mouth every morning (for morning headache)       cholecalciferol (VITAMIN D3) 1000 units (25 mcg) capsule Take 1 capsule by mouth daily       escitalopram (LEXAPRO) 5 MG tablet Take 1 tablet (5 mg) by mouth daily 90 tablet 3     Fexofenadine HCl (ALLEGRA PO) Take 1 tablet by mouth daily       lisinopril (PRINIVIL/ZESTRIL) 40 MG tablet Take 1 tablet (40 mg) by mouth daily For high Blood Pressure 90 tablet 3     pantoprazole (PROTONIX) 20 MG EC tablet Take 1 tablet (20 mg) by mouth daily 90 tablet 1     pravastatin (PRAVACHOL) 40 MG tablet Take 1 tablet (40 mg) by mouth At Bedtime For high cholestrol 90 tablet 3     QUEtiapine (SEROQUEL) 25 MG tablet Take 0.5 tablets (12.5 mg) by mouth 2 times daily as needed 20 tablet 1     senna-docusate (SENOKOT-S/PERICOLACE) 8.6-50 MG tablet Take 1 tablet by mouth 2 times daily as needed for constipation       sulfamethoxazole-trimethoprim (BACTRIM DS/SEPTRA DS) 800-160 MG tablet TAKE HALF (1/2) TABLET BY MOUTH DAILY AS NEEDED AFTER INTERCOURSE TO PREVENT UTI 30 tablet 1     venlafaxine (EFFEXOR XR) 150 MG 24 hr capsule Take 2 capsules (300 mg) by mouth daily 180 capsule 3     OTC products: None, except as noted above    Allergies   Allergen Reactions     Seafood Anaphylaxis     Femara [Letrozole] Rash     Latex Rash     WITH LATEX BANDAIDS     Tamoxifen Rash      Latex Allergy:  "NO    Social History     Tobacco Use     Smoking status: Never Smoker     Smokeless tobacco: Never Used   Substance Use Topics     Alcohol use: Yes     Alcohol/week: 1.0 standard drinks     Comment: 1 or 2 glasses of wine a week     History   Drug Use No       REVIEW OF SYSTEMS:   Constitutional, neuro, ENT, endocrine, pulmonary, cardiac, gastrointestinal, genitourinary, musculoskeletal, integument and psychiatric systems are negative, except as otherwise noted.    EXAM:   Pulse 96   Temp 96.8  F (36  C) (Temporal)   Ht 1.626 m (5' 4\")   Wt 58.2 kg (128 lb 3.2 oz)   LMP 09/01/2000 (Exact Date)   SpO2 99%   Breastfeeding No   BMI 22.01 kg/m      GENERAL APPEARANCE: healthy, alert and no distress  On memory is fair to poor I had to repeat questions her  was helping her     EYES: EOMI, PERRL     HENT: ear canals and TM's normal and nose and mouth without ulcers or lesions     NECK: no adenopathy, , masses, or scars and thyroid normal to palpation     RESP: lungs clear to auscultation - no rales, rhonchi or wheezes     CV: regular rates and rhythm, normal S1 S2, no S3 or S4 and no murmur, click or rub     ABDOMEN:  soft, nontender, no HSM or masses and bowel sounds normal     MS: extremities normal- no gross deformities noted, no evidence of inflammation in joints, FROM in all extremities.     SKIN: no suspicious lesions or rashes     NEURO: Normal strength and tone, sensory exam grossly normal, mentation patient has dementia  and speech normal     PSYCH: mentation appears normal. and affect normal/bright     LYMPHATICS: No cervical adenopathy    DIAGNOSTICS:   EKG: appears normal, NSR, Right Bundle Branch Block  Right bundle branch block is not new    Recent Labs   Lab Test 02/10/20  1603 01/24/20  1401  10/24/17  1458  02/17/17  1400  09/12/14  0855  08/27/13  1438   HGB 15.9* 15.1   < > 13.3   < > 12.9   < > 13.4   < > 11.4*    380   < > 367   < >  --    < > 321   < > 361   INR  --   --   --   -- "   --  0.98  --   --   --  0.95    134   < > 137   < >  --    < > 138  --   --    POTASSIUM 3.6 4.5   < > 5.1   < > 3.1*   < > 4.7  --   --    CR 1.31* 1.39*   < > 1.34*   < >  --    < > 0.78   < > 0.74   A1C  --   --   --  5.3  --   --   --  5.8  --   --     < > = values in this interval not displayed.      Results for orders placed or performed in visit on 06/15/20   CBC with platelets     Status: None   Result Value Ref Range    WBC 5.9 4.0 - 11.0 10e9/L    RBC Count 4.18 3.8 - 5.2 10e12/L    Hemoglobin 12.8 11.7 - 15.7 g/dL    Hematocrit 37.6 35.0 - 47.0 %    MCV 90 78 - 100 fl    MCH 30.6 26.5 - 33.0 pg    MCHC 34.0 31.5 - 36.5 g/dL    RDW 13.9 10.0 - 15.0 %    Platelet Count 293 150 - 450 10e9/L         IMPRESSION:   Reason for surgery/procedure: Trigeminal neuralgia requiring radiofrequency ablation  Diagnosis/reason for consult: Preop history and physical    The proposed surgical procedure is considered LOW risk.    REVISED CARDIAC RISK INDEX  The patient has the following serious cardiovascular risks for perioperative complications such as (MI, PE, VFib and 3  AV Block):  No serious cardiac risks  INTERPRETATION: 1 risks: Class II (low risk - 0.9% complication rate)    The patient has the following additional risks for perioperative complications:  No identified additional risks   Dementia    Olivia was seen today for pre-op exam.    Diagnoses and all orders for this visit:    Preop general physical exam  -     CBC with platelets  -     EKG 12-lead complete w/read - Clinics  -     Phosphorus    Trigeminal neuralgia  She is going to have radiofrequency ablation    Late onset Alzheimer's disease with behavioral disturbance (H)  It is getting worse slowly.  Advised to make an appointment with Dr. Hilary Ledezma  Discuss via epic message with her about this patient  She was okay with using Seroquel on as-needed basis for agitation and restlessness    Major depressive disorder, recurrent episode, moderate  (H)  She is taking antidepressants    CKD (chronic kidney disease) stage 3, GFR 30-59 ml/min (H)  -     Renal panel (Alb, BUN, Ca, Cl, CO2, Creat, Gluc, Phos, K, Na)    Screening for deficiency anemia  -     Ferritin    Screening for thyroid disorder  -     TSH with free T4 reflex    Hyperlipidemia LDL goal <100  -     Lipid panel reflex to direct LDL Non-fasting          RECOMMENDATIONS:     Patient is optimal for above procedure as it is considered low risk procedure    --Patient is to take all scheduled medications on the day of surgery EXCEPT for modifications listed below.    Avoid aspirin 7 days before the surgery. Avoid nonsteroidal anti-inflammatory pain medication like ibuprofen, Motrin, or Aleve 7 days before the surgery.  Tylenol is okay to use for pain.  Avoid any OTC multivitamins or herbal supplement 7 days before surgery   Resume after surgery  Do not take lisinopril like before the surgery  Resume after the surgery  Seroquel can be used if patient gets agitated or restless    Disclaimer: This note consists of symbols derived from keyboarding, dictation and/or voice recognition software. As a result, there may be errors in the script that have gone undetected. Please consider this when interpreting information found in this chart.      Signed Electronically by: Ame Villalobos MD    Copy of this evaluation report is provided to requesting physician.    [place] Preop Guidelines    Revised Cardiac Risk Index

## 2020-06-15 NOTE — PATIENT INSTRUCTIONS
Avoid aspirin 7 days before the surgery. Avoid nonsteroidal anti-inflammatory pain medication like ibuprofen, Motrin, or Aleve 3 days before the surgery.  Tylenol is okay to use for pain.  Avoid any OTC multivitamins or herbal supplement 7 days before surgery   Resume after surgery  Do not take lisinopril night before surgery  Resume after surgery.        Before Your Surgery      Call your surgeon if there is any change in your health. This includes signs of a cold or flu (such as a sore throat, runny nose, cough, rash or fever).    Do not smoke, drink alcohol or take over the counter medicine (unless your surgeon or primary care doctor tells you to) for the 24 hours before and after surgery.    If you take prescribed drugs: Follow your doctor s orders about which medicines to take and which to stop until after surgery.    Eating and drinking prior to surgery: follow the instructions from your surgeon    Take a shower or bath the night before surgery. Use the soap your surgeon gave you to gently clean your skin. If you do not have soap from your surgeon, use your regular soap. Do not shave or scrub the surgery site.  Wear clean pajamas and have clean sheets on your bed.

## 2020-06-16 PROBLEM — R19.7 DIARRHEA: Status: ACTIVE | Noted: 2020-01-01

## 2020-06-16 NOTE — RESULT ENCOUNTER NOTE
Gladys Baker,    This is to inform you regarding your test result.    CBC result which includes white count Hemoglobin and  Platelet Counts is normal.   Other results are pending.    Sincerely,      Dr.Nasima Wilfredo MD,FACP

## 2020-06-17 NOTE — ED PROVIDER NOTES
History     Chief Complaint:  Diarrhea       The history is provided by the patient, the spouse and medical records. History limited by: Dementia.      Olivia Rodriguez is a 75 year old female with a history of dementia, hypertension, CKD, GERD, hyperlipidemia, trigeminal neuralgia and right breast cancer who presents for evaluation of diarrhea and possible rectal or vaginal bleeding. Today, the patient was evaluated by Dr. Villalobos, her primary care provider, and was noted by her  to appear pale and tired. Her  also expressed concern for diarrhea, vaginal clots and rectal bleeding. Her hemoglobin was noted to be 12.8 yesterday and she was noted to have a worsening creatinine. The patient recently had a negative COVID test. She is scheduled for right sided radio frequency rhizotomy in 7 days. Here, the patient states that she is in no pain.    The patient's  was contacted shortly later and provided additional information. He states that the patient had a new onset of several episodes of diarrhea including some accidents where she was unable to reach the toilet. He states that her stool initially appeared normal, however seemed to become more maroon with clots mixed in. He states that he was unsure if this was vaginal bleeding or rectal bleeding as he did not examine her. He also notes that the patient did not eat anything today, however was very thirsty and consumed a larger amount of water. He states that the patient does sometimes have irregular bowel movements, however this was more diarrhea than he expected. Because of this and the profuse blood, he was concerned and presented to Dr. Villalobos, who referred him here.     Allergies:  Seafood  Femara  Latex  Tamoxifen     Medications:    Lexapro  Allegra  Lisinopril  Protonix  Pravachol  Seroquel  Senna-docusate  Effexor    Past Medical History:    Anxiety  Arthritis  Cancer  Dementia  Coronary artery calcification  Depressive  disorder  Hypertension  Iron deficiency anaemia  Memory loss  TMJ arthralgia  CKD  GERD  Hyperlipidemia    Past Surgical History:    Appendectomy  Right breast biopsy  Right axillary lymph node  Right breast partial mastectomy  Colonoscopy, Endoscopy  Cataracts   section  Surgery for Trigeminal neuralgia  Hysterectomy total abdominal, bilateral salpingo-oophorectomy, combined  Laparoscopic herniorrhaphy incisional  Mastectomy partial with sentinel node  Right TKA and left TKA    Family History:    Cardiovascular   Hypertension   Hyperlipidemia   Alzheimer Disease   Depression    Social History:  The patient presents to the ED alone.  Smoking Status: Never Smoker  Smokeless Tobacco: Never Used  Alcohol Use: Yes  Drug Use: No  PCP: Ame Villalobos     Review of Systems   Unable to perform ROS: Dementia   Gastrointestinal: Positive for anal bleeding (possibly) and diarrhea.   Genitourinary: Positive for vaginal bleeding (possibly).   Pertinent positives and negatives as above.  A 14-point review of systems was performed with all other systems reviewed as negative.    Physical Exam     Patient Vitals for the past 24 hrs:   BP Temp Temp src Pulse Heart Rate Resp SpO2   20 2210 -- -- -- -- 99 17 --   20 2150 109/66 -- -- -- 98 17 --   20 2130 115/60 -- -- 98 96 15 --   20 2115 107/64 -- -- 101 101 19 --   20 2100 99/61 -- -- 102 102 25 --   20 2045 118/61 -- -- 101 103 10 --   20 116/68 -- -- 107 106 16 --   20 122/79 98.8  F (37.1  C) Oral -- 107 18 97 %       Physical Exam  Eye:  Pupils are equal, round, and reactive.  Extraocular movements intact.    ENT:  No rhinorrhea.  Moist mucus membranes.  Normal tongue and tonsil.    Cardiac:  Regular rate and rhythm.  No murmurs, gallops, or rubs.    Pulmonary:  Clear to auscultation bilaterally.  No wheezes, rales, or rhonchi.    Abdomen:  Positive bowel sounds.  Abdomen is soft and non-distended, without focal  tenderness.    Musculoskeletal:  Normal movement of all extremities without evidence for deficit.    GI: Rectal exam shows minimal stool in the fault, dark brown in color.     : No blood noted at the vaginal introitus or within the vaginal vault with digital exam.    Skin:  Warm and dry without rashes.    Neurologic:  Non-focal exam without asymmetric weakness or numbness.     Psychiatric:  Patient is significantly demented, awake and alert but unable to provide any history.      Emergency Department Course     Laboratory:  Laboratory findings were communicated with the admitting MD who voiced understanding of the findings.    CBC: WBC: 18.8 (high), HGB: 13.2, PLT: 288    CMP: Glucose 123 (high), Sodium 131 (low), Urea 49 (high), Creatinine 2.60 (high), GFR 17 (low) o/w WNL     ABO/Th Type and Screen: B Neg     UA with Microscopic: Ketones 5, Albumin 30, Mucous Present o/w Negative     Enteric Bacteria and Virus Panel by DANIELE Stool: Pending  Clostridium difficile toxin B PCR: Pending    Interventions:  2110 NS 1L IV     Emergency Department Course:  Past medical records, nursing notes, and vitals reviewed.    2026 I performed an exam of the patient as documented above.     IV was inserted and blood was drawn for laboratory testing, results above.    The patient provided a urine sample here in the emergency department. This was sent for laboratory testing, findings above.    2034 I spoke with Alejandro Rodriguez, the patient's  who provided additional information.    2227 I consulted with Dr. Duran, hospitalist, regarding the patient's history and presentation here in the emergency department who accepts the patient for admission.     Findings and plan explained to the patient  who consents to admission. Discussed the patient with Dr. Duran, who will admit the patient to a medical bed for further monitoring, evaluation, and treatment.    Impression & Plan     Covid-19  Olivia Rodriguez was evaluated during a  global COVID-19 pandemic, which necessitated consideration that the patient might be at risk for infection with the SARS-CoV-2 virus that causes COVID-19.   Applicable protocols for evaluation were followed during the patient's care. COVID-19 was considered as part of the patient's evaluation. The plan for testing is: a test was obtained at a previous visit (negative) and reviewed & considered today.    Medical Decision Making:  This significantly demented 75-year-old presents to us for increasing weakness, diarrhea, and dehydration.  She was seen yesterday by her primary doctor and underwent basic laboratory testing which was essentially normal.  Her hemoglobin had trended down from prior levels but was in a normal range.  Her creatinine had trended upward slightly.  However, her  was concerned and the patient had substantial diarrhea today that had blood mixed in with it.  The diarrhea was rather profuse, causing her to have several accidents.  He was worried about the clots of blood that had been passed and the fact that she had not eaten anything all day.  She also seem to be more weak.    On my exam, the patient is demented but has no complaints for me.  Her abdomen is soft and benign.  With concerns that this could represent vaginal bleeding, a quick bimanual exam was performed which shows no blood in the vaginal vault.  Rectal exam shows an empty vault with a scant amount of darker stool but no obvious signs of significant bleeding.    Laboratory investigation shows a worsening renal insufficiency.  Her hemoglobin came up to some degree, though I believe this is more likely due to hypovolemia.  She now has a new white blood cell count.  However, without focal abdominal pain, this seems unlikely to represent a new bacterial process.  Stool studies have been ordered but not yet received.  She was recently tested for COVID which was negative and therefore this will not be repeated.  Otherwise, patient  requires admission for rehydration and close watch of these symptoms.  The hospitalist agrees to admit the patient to an inpatient bed.    Diagnosis:    ICD-10-CM    1. Diarrhea, unspecified type  R19.7    2. Hematochezia  K92.1    3. Dehydration  E86.0        Disposition:  Admitted to Dr. Duran.    Scribe Disclosure:  I, Liam Dietz, am serving as a scribe at 8:09 PM on 6/16/2020 to document services personally performed by Trierweiler, Chad A, MD based on my observations and the provider's statements to me.      Trierweiler, Chad A, MD  06/17/20 0105

## 2020-06-17 NOTE — PROVIDER NOTIFICATION
MD Notification    Notified Person: MD    Notified Person Name: MD Duran    Notification Date/Time:  6/17 0025    Notification Interaction: Amcom    Purpose of Notification:     Critical procalcitonin 19.23    Orders Received:    No new orders    Comments:

## 2020-06-17 NOTE — PROGRESS NOTES
[facility]    Medicine Progress Note - Hospitalist Service       Date of Admission:  6/16/2020  Assessment & Plan   Olivia Rodriguez is a 75 year old female who presents with diarrhea. Admitted for further evaluation and treatment.      Diarrhea  Leukocytosis  Abdominal tenderness  Suspected ischemic colitis  Patient reportedly with diarrhea and possible vaginal or rectal bleeding on initial presentation, however, no blood identified. Denied abdominal pain, but has significant abdominal tenderness. Stool hemoccult positive.  -C diff and Enteric panel were negative.  -Procalcitonin significantly elevated at 19.23.  -CT abdomen/pelvis ordered this morning, shows moderate colitis involving the ascending, transverse, and descending colon.  -Start empiric Zosyn.  -Consult GI, appreciate their assistance.     Hyponatremia   -Mild, asymptomatic.  -IV fluids.  -Recheck in AM.     Acute kidney injury  Metabolic acidosis  -Likely pre-renal in setting of infection.  -Avoid nephrotoxins.   -Continue IV fluids.   -FENA.   -Nephrology consulted, appreciate their assistance.     History of trigeminal neuralgia  Patient scheduled for rhizotomy in the coming week.  Patient did have negative COVID testing previously.  -Monitor.      Hypertension  Hyperlipidemia  Patient maintained on lisinopril and pravastatin prior to admission.  -Hold PTA lisinopril for now in setting of EVERETTE.  -Restart PTA pravastatin.     Depression/anxiety  -Restart PTA effexor and lexapro.     Dementia  -Restart PTA quetiapine.  -Discussed plan of care with her  today.     GERD  -Restart PTA pantoprazole.        Diet: Combination Diet Renal Diet; Low Saturated Fat Na <2400mg Diet, No Caffeine Diet    DVT Prophylaxis: Pneumatic Compression Devices  Waggoner Catheter: not present  Code Status: Full Code           Disposition Plan   Expected discharge: 2 - 3 days pending further work-up, clinical improvement, and safe discharge plan.  Entered:  Jordan Novak MD 06/17/2020, 10:04 AM       The patient's care was discussed with the Bedside Nurse and Patient.    Jodran Novak MD  Hospitalist Service  [facility]    ______________________________________________________________________    Interval History   Olivia Rodriguez was seen this morning. History is limited by dementia. Feels 'like I was run over by a truck,' but then is unable to tell me what specific symptoms she is having. Denies fevers, chest pain, shortness of breath, nausea, and abdominal pain.    Data reviewed today: I reviewed all medications, new labs and imaging results over the last 24 hours. I personally reviewed no images or EKG's today.    Physical Exam   Vital Signs: Temp: 98.4  F (36.9  C) Temp src: Oral BP: 110/71 Pulse: 98 Heart Rate: 113 Resp: 20 SpO2: 96 % O2 Device: None (Room air)    Weight: 124 lbs 5.43 oz  Constitutional: awake, alert, cooperative, no apparent distress  Respiratory: clear to auscultation bilaterally, no crackles or wheezing  Cardiovascular: regular rate and rhythm, normal S1 and S2, no murmur noted  GI: normal bowel sounds, soft, non-distended, diffuse tenderness, no rebound or guarding  Skin: warm, dry  Musculoskeletal: no lower extremity pitting edema present  Neurologic: awake, alert, not oriented    Data   Recent Labs   Lab 06/17/20  0929 06/16/20  2027 06/15/20  1217   WBC 16.2* 18.8* 5.9   HGB 11.4* 13.2 12.8   MCV 86 87 90    288 293   * 131* 137   POTASSIUM 3.8 3.9 4.6   CHLORIDE 105 99 106   CO2 16* 19* 20   BUN 51* 49* 30   CR 2.38* 2.60* 1.73*   ANIONGAP 10 13 11   GUS 8.6 9.3 9.0   * 123* 99   ALBUMIN  --  3.6 3.7   PROTTOTAL  --  7.2  --    BILITOTAL  --  0.5  --    ALKPHOS  --  62  --    ALT  --  18  --    AST  --  18  --      Medications     IV infusion builder WITH additives         piperacillin-tazobactam  2.25 g Intravenous Q8H     sodium chloride (PF)  3 mL Intracatheter Q8H

## 2020-06-17 NOTE — PROGRESS NOTES
RECEIVING UNIT ED HANDOFF REVIEW    ED Nurse Handoff Report was reviewed by: Amber Marvin RN on June 16, 2020 at 11:23 PM

## 2020-06-17 NOTE — PHARMACY-ADMISSION MEDICATION HISTORY
Pharmacy Medication History  Admission medication history interview status for the 6/16/2020  admission is complete. See EPIC admission navigator for prior to admission medications     Medication history sources:  Alejandro [phone]   Medication history source reliability: Good  Adherence assessment: Moderate    Significant changes made to the medication list:  Per MD holding Excedrin and lisinopril     Haven't started quetiapine     Not taking bactrim prn       Additional medication history information:       Medication reconciliation completed by provider prior to medication history? No    Time spent in this activity: 25min       Prior to Admission medications    Medication Sig Last Dose Taking? Auth Provider   Aspirin-Acetaminophen-Caffeine (EXCEDRIN PO) Take 1 tablet by mouth every morning (for morning headache) Past Week at DR told her to stop for surgery  Yes Reported, Patient   cholecalciferol (VITAMIN D3) 1000 units (25 mcg) capsule Take 1 capsule by mouth daily 6/16/2020 at Unknown time Yes Reported, Patient   escitalopram (LEXAPRO) 5 MG tablet Take 1 tablet (5 mg) by mouth daily 6/16/2020 at Unknown time Yes Hilary Ledezma DO   lisinopril (PRINIVIL/ZESTRIL) 40 MG tablet Take 1 tablet (40 mg) by mouth daily For high Blood Pressure Past Week at MD told to hold due to low BP  Yes Ame Villalobos MD   pantoprazole (PROTONIX) 20 MG EC tablet Take 1 tablet (20 mg) by mouth daily 6/16/2020 at Unknown time Yes Ame Villalobos MD   pravastatin (PRAVACHOL) 40 MG tablet Take 1 tablet (40 mg) by mouth At Bedtime For high cholestrol 6/16/2020 at Unknown time Yes Ame Villalobos MD   senna-docusate (SENOKOT-S/PERICOLACE) 8.6-50 MG tablet Take 1 tablet by mouth 2 times daily as needed for constipation Past Month at Unknown time Yes Ame Villalobos MD   venlafaxine (EFFEXOR XR) 150 MG 24 hr capsule Take 2 capsules (300 mg) by mouth daily 6/16/2020 at Unknown time Yes Hilary Ledezma DO    Fexofenadine HCl (ALLEGRA PO) Take 180 mg by mouth daily  More than a month at Unknown time  Reported, Patient   QUEtiapine (SEROQUEL) 25 MG tablet Take 0.5 tablets (12.5 mg) by mouth 2 times daily as needed  at not started yet   Ame Villalobos MD

## 2020-06-17 NOTE — PROGRESS NOTES
"SPIRITUAL HEALTH SERVICES Progress Note  [facility]    Initiated visit due to pt request.  Pt was in room alone.  Pt exhibited some confusion, but stated that she is \"ready to quit\" and spoke of her love of \"old movies\" which she was watching on the TV.  Pt states that her spirituality is very important to her.  SH provided emotional support and prayer.  SH remains available for further support as needed.    Baljinder Luevano  Chaplain Resident    "

## 2020-06-17 NOTE — H&P
[facility]    History and Physical  Hospitalist       Date of Admission:  6/16/2020  Date of Service (when I saw the patient): 06/16/20    Assessment & Plan   Olivia Rodriguez is a 75 year old female who presents with diarrhea. Admitted for further evaluation and treatment.     Diarrhea, leukocytosis: Patient reportedly with diarrhea and possible vaginal or rectal bleeding on initial presentation, however, no blood identified.  Patient is accompanied by her spouse due to dementia.  History of present illness and review of systems may be limited due to patient with dementia.  Patient was seen or preoperative evaluation of rhizotomy for trigeminal neuralgia and had a negative culture test at that time.  Patient does appear to be more pale and tired according to the .  Again history of present illness and review of systems is limited due to patient with dementia.  -Hemoccult.   -Isolation precautions.   -C diff pending.   -Enteric panel pending.   -Supportive management.     Hyponatremia:   -Gentle IVF.   -Monitor.     Acute kidney injury, stage III: U/a negative.   -Avoid nephrotoxins.   -IVF.   -FENA.   -Nephrology consulted.    Leukocytosis: U/a negative.   -CXR.   -Monitor.     History of trigeminal neuralgia: Patient scheduled for rhizotomy in the coming week.  Patient did have negative COVID testing previously.  -Monitor.     Cardiac, hypertension, hyperlipidemia: Patient maintained on lisinopril and pravastatin prior to admission.  -Continue prior to admission lisinopril when verified by pharmacy.  -Continue prior to admission pravastatin when verified by pharmacy.   -PRN antihypertensives.     Anxiety, depression: Stable.  -Continue prior to admission Effexor when verified by pharmacy.  -Continue prior to admission Lexapro when verified by pharmacy.    Dementia: Stable.  -Continue prior to admission quetiapine when verified by pharmacy.    GERD: Stable.  -Continue prior to admission  pantoprazole when verified by pharmacy.    DVT Prophylaxis: Pneumatic Compression Devices  Code Status: Full Code    Disposition: Inpatient.    Dr. Kimo Duran D.O.  [facility] Hospitalist  Pager [phone]    Primary Care Physician   Ame Villalobos    Chief Complaint   Diarrhea    History is obtained from the patient and medical records.     History of Present Illness   Olivia Rodirguez is a 75 year old female who presents with diarrhea. Admitted for further evaluation and treatment. Patient reportedly with diarrhea and possible vaginal or rectal bleeding on initial presentation, however, no blood identified.  Patient is accompanied by her spouse due to dementia.  History of present illness and review of systems may be limited due to patient with dementia.  Patient was seen or preoperative evaluation of rhizotomy for trigeminal neuralgia and had a negative culture test at that time.  Patient does appear to be more pale and tired according to the .  Again history of present illness and review of systems is limited due to patient with dementia.    Past Medical History    I have reviewed this patient's medical history and updated it with pertinent information if needed.   Past Medical History:   Diagnosis Date     Anxiety      Arthritis      Cancer (H) Aug 2013    right breast     Coronary artery calcification      Depressive disorder 1977     Hypertension      Iron deficiency anaemia     Had endoscopy and colonoscopy done in November of 2012      Memory loss     pt states worse with last surgery      TMJ arthralgia        Past Surgical History   I have reviewed this patient's surgical history and updated it with pertinent information if needed.  Past Surgical History:   Procedure Laterality Date     APPENDECTOMY  1977    during c section     BIOPSY  8/2/13    right breast biopsy     BIOPSY  8/20/13    right axillary lymph node     BREAST SURGERY  8/20/13    right breast partial  mastectomy/lumpectomy      SECTION       COLONOSCOPY      She had colonoscopy and endoscopy done on November 15, 2012 for workup of iron deficiency anemia and the results were satisfactory     EYE SURGERY  2014    cataracts     GYN SURGERY           HEAD & NECK SURGERY      surgery for Trigeminal neuralgia       HYSTERECTOMY TOTAL ABDOMINAL, BILATERAL SALPINGO-OOPHORECTOMY, COMBINED       LAPAROSCOPIC HERNIORRHAPHY INCISIONAL N/A 4/10/2017    Procedure: LAPAROSCOPIC HERNIORRHAPHY INCISIONAL;  Surgeon: Dayanara Kendall MD;  Location: [facility]     MASTECTOMY PARTIAL WITH SENTINEL NODE  2013    Procedure: MASTECTOMY PARTIAL WITH SENTINEL NODE;  RIGHT PARTIAL MASTECTOMY WITH RIGHT AXILLARY SENTINEL NODE BIOPSY;  Surgeon: Kae Padilla MD;  Location: [facility]     ORTHOPEDIC SURGERY      Right TKA and Left TKA       Prior to Admission Medications   Prior to Admission Medications   Prescriptions Last Dose Informant Patient Reported? Taking?   Aspirin-Acetaminophen-Caffeine (EXCEDRIN PO)  Spouse/Significant Other Yes No   Sig: Take 1 tablet by mouth every morning (for morning headache)   Fexofenadine HCl (ALLEGRA PO)  Spouse/Significant Other Yes No   Sig: Take 1 tablet by mouth daily   QUEtiapine (SEROQUEL) 25 MG tablet   No No   Sig: Take 0.5 tablets (12.5 mg) by mouth 2 times daily as needed   cholecalciferol (VITAMIN D3) 1000 units (25 mcg) capsule   Yes No   Sig: Take 1 capsule by mouth daily   escitalopram (LEXAPRO) 5 MG tablet   No No   Sig: Take 1 tablet (5 mg) by mouth daily   lisinopril (PRINIVIL/ZESTRIL) 40 MG tablet   No No   Sig: Take 1 tablet (40 mg) by mouth daily For high Blood Pressure   pantoprazole (PROTONIX) 20 MG EC tablet   No No   Sig: Take 1 tablet (20 mg) by mouth daily   pravastatin (PRAVACHOL) 40 MG tablet   No No   Sig: Take 1 tablet (40 mg) by mouth At Bedtime For high cholestrol   senna-docusate (SENOKOT-S/PERICOLACE) 8.6-50 MG tablet   No No   Sig: Take 1 tablet by  mouth 2 times daily as needed for constipation   sulfamethoxazole-trimethoprim (BACTRIM DS/SEPTRA DS) 800-160 MG tablet   No No   Sig: TAKE HALF (1/2) TABLET BY MOUTH DAILY AS NEEDED AFTER INTERCOURSE TO PREVENT UTI   venlafaxine (EFFEXOR XR) 150 MG 24 hr capsule   No No   Sig: Take 2 capsules (300 mg) by mouth daily      Facility-Administered Medications: None     Allergies   Allergies   Allergen Reactions     Seafood Anaphylaxis     Femara [Letrozole] Rash     Latex Rash     WITH LATEX BANDAIDS     Tamoxifen Rash       Social History   I have reviewed this patient's social history and updated it with pertinent information if needed. Olivia Rodriguez  reports that she has never smoked. She has never used smokeless tobacco. She reports current alcohol use of about 1.0 standard drinks of alcohol per week. She reports that she does not use drugs.    Family History   I have reviewed this patient's family history and updated it with pertinent information if needed.   Family History   Problem Relation Age of Onset     Cardiovascular Father      Hypertension Father      Hyperlipidemia Father      Alzheimer Disease Mother      Depression Mother      Genetic Disorder Mother         Alzheimer's symptoms     Cancer - colorectal No family hx of      Breast Cancer No family hx of        Review of Systems   The 10 point Review of Systems is negative other than noted in the HPI or here. history of present illness and review of systems is limited due to patient with dementia.    Physical Exam   Temp: 98.8  F (37.1  C) Temp src: Oral BP: 115/60 Pulse: 98 Heart Rate: 96 Resp: 15 SpO2: 97 % O2 Device: None (Room air)    Vital Signs with Ranges  Temp:  [98.8  F (37.1  C)] 98.8  F (37.1  C)  Pulse:  [] 98  Heart Rate:  [] 96  Resp:  [10-25] 15  BP: ()/(60-79) 115/60  SpO2:  [97 %] 97 %  0 lbs 0 oz    GENERAL: Alert. NAD. Conversational, appropriate.   HEENT: Normocephalic. EOMI. No icterus or injection. Nares normal.    LUNGS: Clear to auscultation. No dyspnea at rest.   HEART: Regular rate. Extremities perfused.   ABDOMEN: Soft, nontender, and nondistended. Positive bowel sounds.   EXTREMITIES: No LE edema noted.   NEUROLOGIC: Moves extremities x4 on command. No acute focal neurologic abnormalities noted.     Data   Data reviewed today:  I personally reviewed both laboratory and imaging data.   Recent Labs   Lab 06/16/20  2027 06/15/20  1217   WBC 18.8* 5.9   HGB 13.2 12.8   MCV 87 90    293   * 137   POTASSIUM 3.9 4.6   CHLORIDE 99 106   CO2 19* 20   BUN 49* 30   CR 2.60* 1.73*   ANIONGAP 13 11   GUS 9.3 9.0   * 99   ALBUMIN 3.6 3.7   PROTTOTAL 7.2  --    BILITOTAL 0.5  --    ALKPHOS 62  --    ALT 18  --    AST 18  --        No results found for this or any previous visit (from the past 24 hour(s)).

## 2020-06-17 NOTE — PLAN OF CARE
DATE & TIME: 6/17/2020 5796-0425  Cognitive Concerns/ Orientation : Alert to self only, Short term memory loss, forgetful  BEHAVIOR & AGGRESSION TOOL COLOR: Yellow, confused  CIWA SCORE: N/a  ABNL VS/O2: VSS ex. Tachycardic   MOBILITY: Ax2, GB  PAIN MANAGMENT: Denies  DIET: Low fat, <2400  Na, no caffeine diet   BOWEL/BLADDER: Up to BSC, Incontinent   ABNL LAB/BG: Procalcintonin 19.23, MD notified. WBC 18.8. Creatine 2.60  DRAIN/DEVICES: PIV infusing   TELEMETRY RHYTHM: Tele: ST  SKIN: Intact ex. Scrap on R knee, Flaky skin on bottom of BLE   TESTS/PROCEDURES: Portable chest x-ray. C. Diff results  Negative, Viral/ bacterial stool panel results pending. Nephrology consulted  D/C DAY/GOALS/PLACE:  TBD  OTHER IMPORTANT INFO:  Enteric precautions maintained. 1 episode of diarrhea, Stool sample sent. Stool watery/ maroon.  Urine sample sent. Pt. Very forgetful, Needs frequent reminders and redirection.   COMMIT TO SIT DONE AND SIGNED OFF YES

## 2020-06-17 NOTE — CONSULTS
[facility]  Gastroenterology Consultation         Olivia Rodriguez  [address]  [facility] [address]  75 year old female    Admission Date/Time: 6/16/2020  Primary Care Provider: Ame Villalobos  Referring / Attending Physician:  Dr. Jordan Novak    We were asked to see the patient in consultation by Dr. Jordan Novak for evaluation of abdominal pain, diarrhea and hemoccult positive test.      CC: abdominal pain     HPI:  Olivia Rodriguez is a 75 year old female who has a past medical history of anemia, right breast cancer, and dementia. She presented with complaints of diarrhea and abdominal pain. She is unable to confirm history- given dementia. Therefore a majority of history has been from a chart review.     She has had a hemoccult positive stool test and diarrhea therefore, enteric panel and c difficile was tested. She has tested negative for infectious cause. Her hemoglobin at baseline is 13.2, and now stable around 11-11.5. Wbc 16.2 and trending down. Platelets 270,000. NA+ 131,  Creatinine 2.38. lactic acid 1.8. Vitals stable.    ROS: A comprehensive ten point review of systems was negative aside from those in mentioned in the HPI.      PAST MED HX:  I have reviewed this patient's medical history and updated it with pertinent information if needed.   Past Medical History:   Diagnosis Date     Anxiety      Arthritis      Cancer (H) Aug 2013    right breast     Coronary artery calcification      Depressive disorder 1979     Hypertension      Iron deficiency anaemia     Had endoscopy and colonoscopy done in November of 2012      Memory loss     pt states worse with last surgery      TMJ arthralgia        MEDICATIONS:   Prior to Admission Medications   Prescriptions Last Dose Informant Patient Reported? Taking?   Aspirin-Acetaminophen-Caffeine (EXCEDRIN PO) Past Week at  told her to stop for surgery  Spouse/Significant Other Yes Yes   Sig: Take 1 tablet by mouth every morning  (for morning headache)   Fexofenadine HCl (ALLEGRA PO) More than a month at Unknown time Spouse/Significant Other Yes No   Sig: Take 180 mg by mouth daily    QUEtiapine (SEROQUEL) 25 MG tablet  at not started yet   No No   Sig: Take 0.5 tablets (12.5 mg) by mouth 2 times daily as needed   cholecalciferol (VITAMIN D3) 1000 units (25 mcg) capsule 6/16/2020 at Unknown time  Yes Yes   Sig: Take 1 capsule by mouth daily   escitalopram (LEXAPRO) 5 MG tablet 6/16/2020 at Unknown time  No Yes   Sig: Take 1 tablet (5 mg) by mouth daily   lisinopril (PRINIVIL/ZESTRIL) 40 MG tablet Past Week at MD told to hold due to low BP   No Yes   Sig: Take 1 tablet (40 mg) by mouth daily For high Blood Pressure   pantoprazole (PROTONIX) 20 MG EC tablet 6/16/2020 at Unknown time  No Yes   Sig: Take 1 tablet (20 mg) by mouth daily   pravastatin (PRAVACHOL) 40 MG tablet 6/16/2020 at Unknown time  No Yes   Sig: Take 1 tablet (40 mg) by mouth At Bedtime For high cholestrol   senna-docusate (SENOKOT-S/PERICOLACE) 8.6-50 MG tablet Past Month at Unknown time  No Yes   Sig: Take 1 tablet by mouth 2 times daily as needed for constipation   venlafaxine (EFFEXOR XR) 150 MG 24 hr capsule 6/16/2020 at Unknown time  No Yes   Sig: Take 2 capsules (300 mg) by mouth daily      Facility-Administered Medications: None       ALLERGIES:   Allergies   Allergen Reactions     Seafood Anaphylaxis     Femara [Letrozole] Rash     Latex Rash     WITH LATEX BANDAIDS     Tamoxifen Rash       SOCIAL HISTORY:  Social History     Tobacco Use     Smoking status: Never Smoker     Smokeless tobacco: Never Used   Substance Use Topics     Alcohol use: Yes     Alcohol/week: 1.0 standard drinks     Comment: 1 or 2 glasses of wine a week     Drug use: No       FAMILY HISTORY:  Family History   Problem Relation Age of Onset     Cardiovascular Father      Hypertension Father      Hyperlipidemia Father      Alzheimer Disease Mother      Depression Mother      Genetic Disorder Mother          Alzheimer's symptoms     Cancer - colorectal No family hx of      Breast Cancer No family hx of        PHYSICAL EXAM:   General  Alert, oriented and comfortable  Vital Signs with Ranges  Temp: 98.4  F (36.9  C) Temp src: Oral BP: 110/71 Pulse: 98 Heart Rate: 113 Resp: 20 SpO2: 96 % O2 Device: None (Room air)    I/O last 3 completed shifts:  In: 400 [I.V.:400]  Out: 150 [Urine:150]    Constitutional: healthy, alert and no distress   Cardiovascular: negative, PMI normal. No lifts, heaves, or thrills. RRR. No murmurs, clicks gallops or rub  Respiratory: negative, Percussion normal. Good diaphragmatic excursion. Lungs clear  Head: Normocephalic. No masses, lesions, tenderness or abnormalities  Neck: Neck supple. No adenopathy. Thyroid symmetric, normal size,, Carotids without bruits.  Abdomen: Abdomen soft,tender. BS normal. No masses, organomegaly, positive findings: tenderness moderate generalized          ADDITIONAL COMMENTS:   I reviewed the patient's new clinical lab test results.   Recent Labs   Lab Test 06/17/20  0929 06/16/20  2027 06/15/20  1217  02/17/17  1400  08/27/13  1438   WBC 16.2* 18.8* 5.9   < >  --    < > 6.7   HGB 11.4* 13.2 12.8   < > 12.9   < > 11.4*   MCV 86 87 90   < >  --    < > 90    288 293   < >  --    < > 361   INR  --   --   --   --  0.98  --  0.95    < > = values in this interval not displayed.     Recent Labs   Lab Test 06/17/20  0929 06/16/20  2027 06/15/20  1217   POTASSIUM 3.8 3.9 4.6   CHLORIDE 105 99 106   CO2 16* 19* 20   BUN 51* 49* 30   ANIONGAP 10 13 11     Recent Labs   Lab Test 06/16/20  2100 06/16/20  2027 06/15/20  1217 02/10/20  1603 01/24/20  1401 11/30/19  1350 11/30/19  1341  01/04/18  1041   ALBUMIN  --  3.6 3.7 4.0 4.1  --  4.0   < >  --    BILITOTAL  --  0.5  --  0.6 0.5  --  0.4   < >  --    ALT  --  18  --  28 18  --  26   < >  --    AST  --  18  --  22 13  --  16   < >  --    PROTEIN 30*  --   --   --   --  Negative  --   --  100*   LIPASE  --   --    --  443*  --   --  286  --   --     < > = values in this interval not displayed.       I reviewed the patient's new imaging results.        CONSULTATION ASSESSMENT AND PLAN:   Jaci Rodriguez is a pleasant 75 year old female with dementia with complaints of diarrhea and abdominal pain. GI consulted for further evaluation     Active Problems:    Diarrhea    Generalized abdominal pain    Leukocytosis   Patient noted to have hemoccult positive loose stool and abdominal tenderness on exam. Infectious cause was considered and ruled out with enteric panel and C difficile. Ischemic colitis is a possibility vs IBD vs overflow diarrhea. Patient underwent abdominal and pelvic CT scan. Results pending.    - Will await CT scan results and determine if further evaluation with a colonoscopy is recommended  - Will recommend clear liquid diet today  - NPO at midnight - may start colon prep this evening pending results        [facility].  Office: [phone]  Cell : [phone] (Dr. Galloway)  Cell: [phone] (Lynn Ruiz PA-C)

## 2020-06-17 NOTE — ED NOTES
[facility]  ED Nurse Handoff Report    ED Chief complaint: Abnormal Labs      ED Diagnosis:   Final diagnoses:   Diarrhea, unspecified type   Hematochezia   Dehydration       Code Status: to be addressed by admitting doctor    Allergies:   Allergies   Allergen Reactions     Seafood Anaphylaxis     Femara [Letrozole] Rash     Latex Rash     WITH LATEX BANDAIDS     Tamoxifen Rash       Patient Story: Pt poor historian, pt's  reports pt had new onset of diarrhea that seemed to turn maroon with clots he also was unsure if it was stool or from the vaginal area. Pt has had decreased appetite and consumed a lot of water. Pt was scheduled for a R sided radio frequency Rhizotomy in the next 7 days .   Focused Assessment:  Pt alert, orientated to self, cooperative and follows commands. Pt denies any pain. Pt was unaware of any bleeding concerns.  bedside exam was negative for vaginal or rectal blood.     Treatments and/or interventions provided: IV fluids  Patient's response to treatments and/or interventions: toleratinng well    To be done/followed up on inpatient unit:  unknown    Does this patient have any cognitive concerns?: Disoriented to situation    Activity level - Baseline/Home:  Walker  Activity Level - Current:   Stand with assist x2    Patient's Preferred language: English   Needed?: No    Isolation: ENTERIC   Infection: enteric  Other   Bariatric?: No    Vital Signs:   Vitals:    06/16/20 2045 06/16/20 2100 06/16/20 2115 06/16/20 2130   BP: 118/61 99/61 107/64 115/60   Pulse: 101 102 101 98   Resp: 10 25 19 15   Temp:       TempSrc:       SpO2:           Cardiac Rhythm:Cardiac Rhythm: Sinus tachycardia()    Was the PSS-3 completed:   Yes  What interventions are required if any?               Family Comments: not here  OBS brochure/video discussed/provided to patient/family: N/A              Name of person given brochure if not patient: n/a              Relationship to  patient: n/a    For the majority of the shift this patient's behavior was Green.   Behavioral interventions performed were none.    ED NURSE PHONE NUMBER: *[phone]

## 2020-06-17 NOTE — PLAN OF CARE
DATE & TIME: 6/17/2020 AM shift  Cognitive Concerns/ Orientation : Alert to self only, baseline dementia. Pt thinks it is 1945 and January. Pt keeps talking about her mom who is waiting for her in the attic.   BEHAVIOR & AGGRESSION TOOL COLOR: Green. Redirectable. Sets off alarms.   CIWA SCORE: N/a  ABNL VS/O2: VSS on RA, tachy at times  MOBILITY: Ax1-2, GB. Up to the chair and BSC.   PAIN MANAGMENT: Denies  DIET:declined to eat this morning. Now diet switched to clear liquids diet per GI  BOWEL/BLADDER: Incontinent of B&B. Two BMs loose but no signs of bleeding.   ABNL LAB/BG: Na 131, Cr 2.38. Hg 11.4; WBC 16.2. DRAIN/DEVICES: PIV infusing, IVF switch to half normal saline  with bicarb per Nephrology.   TELEMETRY RHYTHM: SR  SKIN: Intact ex. Scrap on R knee, Flaky skin on bottom of BLE   TESTS/PROCEDURES: CT of abdomen done, showed moderate colitis.  D/C DAY/GOALS/PLACE: TBD, GI and Nephrology are following. Possibly will need colonoscopy per GI note. Started on IV antibiotics.   OTHER IMPORTANT INFO:  Enteric precautions discontinued.   COMMIT TO SIT DONE AND SIGNED OFF YES

## 2020-06-17 NOTE — RESULT ENCOUNTER NOTE
Spoke to  .  Informed about worsening creatinine .  Also hemoglobin and ferritin level is lower compared to before.   informed that patient is having diarrhea , vaginal bleeding and possible rectal bleeding  Due to patient' s dementia sometimes hard to figure out  .  Patient appears pale and tired per his report  I recommend ED evaluation   She was tested negative for COVID result was faxed from [facility].  I informed the ED about this patient    will bring her as they do not live far from hospital.

## 2020-06-17 NOTE — CONSULTS
Consult Date:  06/17/2020      IDENTIFICATION:  This is a 75-year-old female admitted through the Emergency Room, dated 06/16/2020 in the setting of diarrhea.      REASON FOR CONSULTATION:  Evaluation and assessment with respect to apparent acute on chronic renal insufficiency.      IMPRESSION:     1.  Baseline chronic kidney disease, stage III.  Data review demonstrates a baseline creatinine, typically in the range of    -estimated GFR on this basis is likely 50-60 mL per minute.   2.  Hypertension by history, probably contributing to hypertensive nephrosclerosis.   3.  Kent urinalysis by review without cysts consistent with documented dipstick proteinuria.   4.  Acute kidney injury with admission creatinine 2.60 mg/dL.  This is in the setting of diarrhea, presumed decreased intervascular volume and outpatient lisinopril.  Improvement with saline infusion overnight to a value today of 2.38 mg/dL.   5.  Imaging studies are nondiagnostic.  CT scan, no renal pathology.  Urinalysis not consistent with glomerular disease.  Sodium noted to be 9 with a phenol of less than 1.1%, suggestive of prerenal acute kidney injury.   6.  Probable hypovolemic hypo-osmolar hyponatremia with admitting serum sodium of 131.   7.  Non-anion gap metabolic acidosis in the setting of diarrhea.   8.  Anemia with recent ferritin of 52.  No iron studies for review.   9.  Diarrhea.   10.  Hypertension.   11.  Dementia, limiting history.      RECOMMENDATIONS:   1.  Discontinue saline infusion.   2.  Begin half normal saline with 75 mEq of bicarbonate to help correct the metabolic acidosis.   3.  Document iron studies.  Consider IV iron on that basis.   4.  Avoid nephrotoxic agents.   5.  Continue to hold lisinopril.   6.  Continue to follow with you.      HISTORY OF PRESENT ILLNESS:  A 75-year-old female admitted through the Emergency Room with diarrhea.  Treated in the Emergency Room with 1 liter of saline and subsequently normal saline at 100  mL an hour while on the floor.          Her past medical history is reviewed.  She has a number of comorbid medical problems including CKD stage III, hypertension, dementia, and general neuralgia, who is unable to provide significant history.      She is awake, alert, and answers questions.  Appropriateness level is decreased.  She denies headache or chest pain.  Her memory recall is poor.  She does remember where she was born and went to high school.      As I noted, her baseline creatinine is in the range of 1 to 1.1.  Perhaps she has had intermittent acute kidney injury in the past and presents with a creatinine of 2.6, which is improved to 2.38 with saline infusion.  Her urine studies suggest prerenal azotemia.  In this setting, she also has metabolic acidosis from presumed diarrhea.      PAST MEDICAL HISTORY:   1.  CKD stage III.   2.  Hypertension.   3.  History of iron deficiency.   4.  Dementia.   5.  Memory loss.   6.  GERD.   7.  Hyperlipidemia.   8.  Breast cancer.   9.  Arthritis.   10.  Anxiety.      ADMISSION MEDICATIONS:  Lexapro, Allegra, lisinopril, Protonix, Pravachol, Seroquel, Senna, and Effexor.      ALLERGIES:  NOTED.      FAMILY AND SOCIAL HISTORY:  Reviewed in the medical record.  They are not directly obtainable from the patient.      REVIEW OF SYSTEMS:  Complete review of systems attempted but limited given patient's dementia.      PHYSICAL EXAMINATION:   VITAL SIGNS:  She is afebrile.  Rhythm is sinus.  Her rate is 100.  Her blood pressure is 110-120.  Sats are adequate on room air.   GENERAL:  Somewhat disheveled, elderly female.   HEENT:  Normocephalic, atraumatic.  Pupils equal, round, and reactive to light and accommodation.  Extraocular muscles intact.  Sclerae nonicteric.  Conjunctivae not injected.  External auditory canals and nares visually patent.  Pharynx without lesion.   NECK:  Supple.   CHEST:  Symmetric and clear.   CARDIOVASCULAR:  Regular.   ABDOMEN:  Soft.   EXTREMITIES:   Without significant edema.   NEUROLOGIC:  Grossly nonfocal.      LABORATORY DATA:  Current and historic reviewed in detail.         KENDALL DING MD             D: 2020   T: 2020   MT:       Name:     NATALIE ZHOU   MRN:      [id number]        Account:       [id number]   :      1945           Consult Date:  2020      Document: [id number]

## 2020-06-18 NOTE — PLAN OF CARE
Discharge Planner OT   Patient plan for discharge: per chart, return to [facility]  Current status: order received and chart reviewed.  Per discussion with PT, who spoke with patient's spouse, no skilled OT needs identified.  Patient's spouse anticipates increased assist will be needed with toileting, and states increased assist can be arranged from nursing home as well  Barriers to return to prior living situation: defer to PT  Recommendations for discharge: defer to PT  Rationale for recommendations: no skilled OT needs identified.  Will complete orders.       Entered by: Radha Ramos 06/18/2020 2:27 PM

## 2020-06-18 NOTE — PLAN OF CARE
PT: Attempted to see pt for evaluation, pt sound asleep. Some increased alertness with lights on, tactile stimulation and HOB elevated, answering a couple questions but pt with eyes closed throughout. Total A to straighten out in bed.     Called pt's spouse for info on living environment & PLOF. Pt's spouse states she tends to wake up later in the morning after about 10am. Pt & spouse live in [facility]. Pt receives PM cares from [facility]. Pt's spouse helps pt get dressed in the morning, helps with bathing & provides HHA for ambulation. Pt has been able to get on & off the toilet & perform pericares without assist though spouse helps her into/out of the bathroom.     Addendum: returned after 10, pt awake & able to complete PT assessment.    Discharge Planner PT   Patient plan for discharge: pt did not state; pt's spouse states plan is for pt to return home to [facility] with him with continued assist, state additional assist from [facility] could be arranged if needed.  Current status: PT orders received, eval completed, treatment initiated. Pt admitted 6/16 with diarrhea & suspected ischemic colitis. Called pt's spouse for PLOF & living environment info, see above.     Currently, pt received seated on commode with nursing. Sit>stand with Kit/HHA, able to take a few steps to chair with HHA, slow pace, some unsteadiness. Stand>sit at chair with CGA. Trialed FWW. Sit>stand from chair to walker with HHA/Kit. Pt able to hold onto walker for support in static standing but not able to follow cues for walker use for ambulation, preferring HHA. Ambulated 20' to/from door in room with HHA/Kit, with some unsteadiness, slow pace. Performed sit<>stand at chair x3 reps for strengthening with HHA/Kit. Pt lethargic throughout session. Pt seated in chair upon departure with all needs in reach, alarm on.     Recommend pt get up to bathroom with nursing with HHA when alert to promote activity similar to home & prevent deconditioning  while hospitalize. Recommend 2nd assist to manage IV pole if connected.     Barriers to return to prior living situation: fatigues quickly, limited ambulation distance  Recommendations for discharge: Home with HHA for all ambulation and transfers, resumed assist for ADLs with additional assist for toileting  Rationale for recommendations: Patient typically ambulates with HHA of spouse & has assist with most ADLs. Pt able to ambulate short distance today with HHA, appears slightly below baseline. Anticipate pt will need additional assist with toileting as well as resumed level of assist upon return home.        Entered by: Annie Hidalgo 06/18/2020 10:48 AM

## 2020-06-18 NOTE — PROVIDER NOTIFICATION
MD Notification    Notified Person: MD    Notified Person Name: Dr. Galloway    Notification Date/Time: 2055    Notification Interaction: phone call to GI answering service    Purpose of Notification: patient refusing bowel prep    Orders Received: keep patient on clear liquid diet, and will reassess tomorrow    Comments:

## 2020-06-18 NOTE — PLAN OF CARE
DATE & TIME: 6/18/2020 AM shift  Cognitive Concerns/ Orientation : Alert to self only, baseline dementia. Lethargic this shift, very tired and sleepy.    BEHAVIOR & AGGRESSION TOOL COLOR: Green. Redirectable. Sets off alarms.   CIWA SCORE: N/a  ABNL VS/O2: VSS on RA, BP elevated 150/80  MOBILITY: Ax1-2, GB. Up to the chair and BSC.   PAIN MANAGMENT: Denies  DIET:clear liquid diet, but very poor oral intake. Takes only sips of water or juice.   BOWEL/BLADDER: Incontinent of B&B. Two BMs loose but no signs of bleeding. Sometimes up to BSC.  ABNL LAB/BG: K 3.1, replaced per MD recommendation with 40 mEq of K. Cr 1.64. Hg 9.5; WBC 12.6.  DRAIN/DEVICES: PIV infusing, IVF with bicarb stopped, 0.9 NaCl at 75 ml/hr.   TELEMETRY RHYTHM: SR  SKIN: Intact ex. Scrap on R knee, Flaky skin on bottom of BLE   TESTS/PROCEDURES: n/a  D/C DAY/GOALS/PLACE: TBD, GI is following. Nephrology signed off. Continues on IV antibiotic, zosyn q6hr. Per last GI note, pt needs colonoscopy but pt unable to drink the prep. They will reassess today. PT is following, recommend home with HHA. Pt was started on Iron infusions today for anemia. Resumed home Effexor and Lexapro.   OTHER IMPORTANT INFO:n/a  COMMIT TO SIT DONE AND SIGNED OFF YES

## 2020-06-18 NOTE — PLAN OF CARE
DATE & TIME: 06/18/2020 4051-4671  Cognitive Concerns/ Orientation : Alert to self only, baseline dementia.    BEHAVIOR & AGGRESSION TOOL COLOR: Green. Redirectable. Does not call appropriately & sets off alarms frequently.   CIWA SCORE: N/A  ABNL VS/O2: VSS on RA  MOBILITY: Ax1-2, GB. Up to the chair and BSC.   PAIN MANAGMENT: Denies  DIET: Clear liquids per GI, been having poor appetite.  BOWEL/BLADDER: Incontinent of B&B. X2 loose/mucous stools this shift.   ABNL LAB/BG: N/A   DRAIN/DEVICES: PIV infusing @ 125ml/hr  TELEMETRY RHYTHM: NSR  SKIN: Intact ex. scrap on R knee, flaky skin on bottom of BLE   TESTS/PROCEDURES: N/A  D/C DAY/GOALS/PLACE: TBD, GI and nephrology are following. Possibly will need colonoscopy per GI note, refused bowel prep last evening.  OTHER IMPORTANT INFO:   MD/RN ROUNDING SIGNED OFF D/E SHIFT: N/A  COMMIT TO SIT DONE AND SIGNED OFF Yes

## 2020-06-18 NOTE — PROGRESS NOTES
[facility]    Medicine Progress Note - Hospitalist Service       Date of Admission:  6/16/2020  Assessment & Plan   Olivia Rodriguez is a 75 year old female who presents with diarrhea. Admitted for further evaluation and treatment.      Diarrhea  Leukocytosis  Abdominal tenderness  Suspected ischemic colitis  Patient reportedly with diarrhea and possible vaginal or rectal bleeding on initial presentation, however, no blood identified. Denied abdominal pain, but has significant abdominal tenderness. Stool hemoccult positive.  -C diff and Enteric panel were negative.  -Procalcitonin significantly elevated at 19.23.  -CT abdomen/pelvis ordered this morning, shows moderate colitis involving the ascending, transverse, and descending colon.  -Continue empiric Zosyn.  -GI consulted, appreciate their assistance.  -Plan for colonoscopy later today.     Hyponatremia   -Mild, asymptomatic.  -IV fluids.  -AM labs pending.     Acute kidney injury  Metabolic acidosis  -Likely pre-renal in setting of infection.  -Avoid nephrotoxins.   -Continue IV fluids.   -FENA suggestive of pre-renal acute kidney injury.  -Nephrology consulted, appreciate their assistance.  -AM labs pending.     History of trigeminal neuralgia  Patient scheduled for rhizotomy in the coming week.  Patient did have negative COVID testing previously.  -Monitor.      Hypertension  Hyperlipidemia  Patient maintained on lisinopril and pravastatin prior to admission.  -Hold PTA lisinopril for now in setting of EVERETTE.  -Continue PTA pravastatin.     Depression/anxiety  -Continue PTA effexor and lexapro.     Dementia  -Continue PTA quetiapine.  -Discussed plan of care with her  on 6/18.     GERD  -Continue PTA pantoprazole.       Diet: Clear Liquid Diet    DVT Prophylaxis: Pneumatic Compression Devices  Waggoner Catheter: not present  Code Status: Full Code           Disposition Plan   Expected discharge: 2 - 3 days, recommended to prior living  arrangement once improved from GI/nephrology standpoints.  Entered: Jordan Novak MD 06/18/2020, 9:04 AM       The patient's care was discussed with the Bedside Nurse and Patient.    Jordan Novak MD  Hospitalist Service  [facility]    ______________________________________________________________________    Interval History   Olivia Rodriguez was seen this morning. Initially she was sleeping, but I woke her up. She was drowsy and confused. No complaints for me. Denied pain and difficulty breathing.    Data reviewed today: I reviewed all medications, new labs and imaging results over the last 24 hours. I personally reviewed no images or EKG's today.    Physical Exam   Vital Signs: Temp: 98.7  F (37.1  C) Temp src: Oral BP: (!) 154/84   Heart Rate: 89 Resp: 16 SpO2: 93 % O2 Device: None (Room air)    Weight: 122 lbs 12.74 oz  Constitutional: awake, alert, cooperative, no apparent distress  Respiratory: clear to auscultation bilaterally, no crackles or wheezing  Cardiovascular: regular rate and rhythm, normal S1 and S2, no murmur noted  GI: normal bowel sounds, soft, non-distended, generalized tenderness  Skin: warm, dry  Musculoskeletal: no lower extremity pitting edema present  Neurologic: awake, alert, not oriented    Data   Recent Labs   Lab 06/18/20  0852 06/17/20  0929 06/16/20  2027 06/15/20  1217   WBC 12.6* 16.2* 18.8* 5.9   HGB 9.5* 11.4* 13.2 12.8   MCV 86 86 87 90    270 288 293   NA  --  131* 131* 137   POTASSIUM  --  3.8 3.9 4.6   CHLORIDE  --  105 99 106   CO2  --  16* 19* 20   BUN  --  51* 49* 30   CR  --  2.38* 2.60* 1.73*   ANIONGAP  --  10 13 11   GUS  --  8.6 9.3 9.0   GLC  --  104* 123* 99   ALBUMIN  --   --  3.6 3.7   PROTTOTAL  --   --  7.2  --    BILITOTAL  --   --  0.5  --    ALKPHOS  --   --  62  --    ALT  --   --  18  --    AST  --   --  18  --      Medications     IV infusion builder WITH additives 125 mL/hr at 06/18/20 0313       escitalopram  5 mg Oral  Daily     pantoprazole  20 mg Oral QAM AC     piperacillin-tazobactam  2.25 g Intravenous Q8H     pravastatin  40 mg Oral At Bedtime     sodium chloride (PF)  3 mL Intracatheter Q8H     venlafaxine  300 mg Oral Daily

## 2020-06-18 NOTE — PLAN OF CARE
DATE & TIME: 6/17/2020 7174-7475  Cognitive Concerns/ Orientation : Alert to self only, baseline dementia.    BEHAVIOR & AGGRESSION TOOL COLOR: Green. Redirectable. Does not call appropriately & sets off alarms.   CIWA SCORE: N/a  ABNL VS/O2: VSS on RA, tachy at times  MOBILITY: Ax1-2, GB. Up to the chair and BSC.   PAIN MANAGMENT: Denies  DIET: clear liquids per GI, declined dinner.  BOWEL/BLADDER: Incontinent of B&B. X2 loose/mucous stools this shift.   ABNL LAB/BG: Na 131, Cr 2.38. Hg 11.4; WBC 16.2.   DRAIN/DEVICES: PIV infusing  TELEMETRY RHYTHM: Sinus tach  SKIN: Intact ex. Scrap on R knee, Flaky skin on bottom of BLE   TESTS/PROCEDURES: CT of abdomen done, showed moderate colitis.  D/C DAY/GOALS/PLACE: TBD, GI and Nephrology are following. Possibly will need colonoscopy per GI note. IV antibiotics.   OTHER IMPORTANT INFO: pt refused bowel prep this shift, GI notified, clear liquid diet continued and will reassess in morning.   MD/RN ROUNDING SIGNED OFF D/E SHIFT: yes  COMMIT TO SIT DONE AND SIGNED OFF yes

## 2020-06-18 NOTE — TELEPHONE ENCOUNTER
calling to inform patient is IN patient for GI issues and hopes to get discharged soon, diagnosed with Ischemic colitis.      He is canceling Rhizotomy procedure set for next week and COvid testing scheduled for Saturday.  They will call when she gets her strength back and ready to reschedule.  Note to Hospital schedulers and Dr Casper completed.  Hope she gets better soon.

## 2020-06-18 NOTE — PROGRESS NOTES
Renal Medicine Progress Note                                Olivia Rodriguez MRN# [id number]   Age: 75 year old YOB: 1945   Date of Admission: 6/16/2020 Hospital LOS: 2                  Assessment/Plan:       75-year-old female admitted through the Emergency Room, dated 06/16/2020 in the setting of diarrhea    Evaluated with respect to apparent acute on chronic renal insufficiency.       1.  CKD 3   -baseline creatinine 1.1 mg/dl    -eGFR 50 to 60 ml/min  2.  [place] UA  3.  HTN    -presumed component of hypertensive nephrosclerosis  4.  ARF   -CT: no obstruction   -[place] 9   -prerenal    -improving with IVF  5.  Hypovolemic hypo osmolar hyponatremia   -corrected  6.  NAGMA   -normalized  7.  Hypokalemia  8.  Anemia    -Fe deficient     IV Fe  Change IVF to NS   Reduce rate    Restart lisinopril when renal function baseline    No further recommendations  Call with questions  Signing off        Interval History:     Resting in bed  Arouses  Limited interaction    UO not recorded    Labs improved      ROS:     Clinical status limits ROS    Medications and Allergies:     Reviewed    Physical Exam:     Vitals were reviewed  Patient Vitals for the past 8 hrs:   BP Temp Temp src Heart Rate Resp SpO2 Weight   06/18/20 0732 (!) 154/84 98.7  F (37.1  C) Oral 89 16 93 % --   06/18/20 0639 -- -- -- -- -- -- 55.7 kg (122 lb 12.7 oz)   06/18/20 0300 (!) 147/84 98.4  F (36.9  C) Oral 94 16 97 % --     I/O last 3 completed shifts:  In: 1449 [I.V.:1449]  Out: -     Vitals:    06/16/20 2348 06/18/20 0639   Weight: 56.4 kg (124 lb 5.4 oz) 55.7 kg (122 lb 12.7 oz)         GENERAL: sleepy  HEENT: NC/AT, PERRLA, EOMI, non icteric, pharynx moist without lesion  CV: RRR, normal S1 S2  ABDOMEN: soft, nontender, no HSM or masses and bowel sounds normal  MS: no clubbing, cyanosis   SKIN: clear without significant rashes or lesions  NEURO: speech normal and cranial nerves 2-12 intact  PSYCH: concentration poor  EXT: warm, no  edema    Data:     Recent Labs   Lab 06/18/20  0852 06/17/20  0929 06/16/20  2027 06/15/20  1217    131* 131* 137   POTASSIUM 3.1* 3.8 3.9 4.6   CHLORIDE 110* 105 99 106   CO2 22 16* 19* 20   ANIONGAP 6 10 13 11   GLC 81 104* 123* 99   BUN 39* 51* 49* 30   CR 1.64* 2.38* 2.60* 1.73*   GFRESTIMATED 30* 19* 17* 28*   GFRESTBLACK 35* 22* 20* 32*   GUS 8.1* 8.6 9.3 9.0         Recent Labs   Lab 06/18/20  0852 06/17/20  0929 06/16/20  2027 06/15/20  1217   CR 1.64* 2.38* 2.60* 1.73*     Recent Labs   Lab Test 06/18/20  0852 06/17/20  0929 06/16/20  2027 06/15/20  1217 02/10/20  1603 01/24/20  1401 11/30/19  1341 08/01/19  1354 11/10/17  1116 10/24/17  1458    131* 131* 137 135 134 136 140 135 137     Recent Labs   Lab 06/16/20  2027 06/15/20  1217   ALBUMIN 3.6 3.7     Recent Labs   Lab 06/18/20  0852 06/17/20  0929 06/16/20  2027 06/15/20  1217   PHOS  --   --   --  4.2  4.1   HGB 9.5* 11.4* 13.2 12.8     Recent Labs   Lab 06/16/20  2100   COLOR Yellow   APPEARANCE Slightly Cloudy   URINEGLC Negative   URINEBILI Negative   URINEKETONE 5*   SG 1.019   UBLD Negative   URINEPH 5.0   PROTEIN 30*   NITRITE Negative   LEUKEST Negative   RBCU 1   WBCU 1     Recent Labs   Lab 06/17/20  0328   UNAR 9     Recent Labs   Lab 06/17/20  0929 06/15/20  1217   IRON 12*  --    IRONSAT 6*  --    *  --    JENNA  --  52         G Will Thornton MD    [facility] - Nephrology  [phone]

## 2020-06-19 NOTE — CONSULTS
Care Coordination:    Care Transition Initial Assessment - RN    Lives at [facility]. Michelle Nurse [phone]  Fax orders at discharge [phone].  Michelle gave approval to return over weekend as they do not do med set up.  Referral sent to [facility] for RN/HHA/PT (per Michelle/ request) Note left for MD that orders for HC and F2F would be needed at discharge.   New meds filled here.   [facility]      Met with:   by phone, Michelle at [facility]  DATA   Active Problems:    Diarrhea       Cognitive Status: Pt is confused, spoke with [facility] and .  [facility]  Primary Care MD Name: Soomar  Contact information and PCP information verified: yes  Lives With: spouse   Living Arrangements: assisted living([facility])         Insurance concerns: No Insurance issues identified  ASSESSMENT  Patient currently receives the following services:  Pt lives with  at [facility].  Pt receives dressing assist, pm cares, HHA aide for bathing.  Does not currently receive med management.   helps with cares.       Identified issues/concerns regarding health management: Pt admitted with diarrhea.  Being followed by GI who wanted to do colonoscopy.  Pt unable to handle prep.  Intermittent confusion and refusal of cares.  Spoke with Michelle at [facility] and  by phone.  Reviewed plan of return to [facility] with HHA for all ambulation.  Michelle stated they could provide that.  She would like [facility] again involved with patient for RN/PT/HHA. She stated  assists as well so patient could return over the weekend.  Orders should be faxed to above number.    Spoke with  who is in agreement with plan to return back to [facility] with [facility] involvement. Email sent to [facility] to watch for orders.     Pt/family was provided with the Medicare Compare list for Home Care.  Discussed associated Medicare star ratings to assist with choice for referrals/discharge planning Yes    Education was given to  pt/family that star ratings are updated/maintained by Medicare and can be reviewed by visiting www.medicare.gov Yes  already aware as had [facility] in the past.   PLAN  Financial costs for the patient include TBD .  Patient given options and choices for discharge yes .  Patient/family is agreeable to the plan?  Yes:   Patient anticipates discharging to [facility] w/ [facility] .        Patient anticipates needs for home equipment: No  Transportation/person available to transport on day of discharge  is  and have they been notified/set up can call  to arrange and meet in [place].  Could be early discharge.   Plan/Disposition: Home/[facility]   Appointments:  to arrange      Care  (CTS) will continue to follow as needed.      Lyudmila Lopes RN BSN  Inpatient Care Coordination  [facility]  [phone]

## 2020-06-19 NOTE — PROGRESS NOTES
[facility]    Medicine Progress Note - Hospitalist Service       Date of Admission:  6/16/2020  Assessment & Plan   Olivia Rodriguez is a 75 year old female who presents with diarrhea. Admitted for further evaluation and treatment.      Diarrhea  Leukocytosis  Abdominal tenderness  Suspected ischemic colitis  Patient reportedly with diarrhea and possible vaginal or rectal bleeding on initial presentation, however, no blood identified. Denied abdominal pain, but has significant abdominal tenderness. Stool hemoccult positive.  -C diff and Enteric panel were negative.  -Procalcitonin significantly elevated at 19.23, trended down to 3.94 on 6/18.  -CT abdomen/pelvis showed moderate colitis involving the ascending, transverse, and descending colon.  -GI consulted, appreciate their assistance.  -Patient refused colonoscopy prep, so will not proceed with colonoscopy at this time.  -Stop empiric Zosyn and transition to oral Augmentin.  -Advance to low fiber diet.  -Possible discharge back to assisted living facility tomorrow with outpatient GI follow-up if she tolerates advancing her diet and switching to oral antibiotics.     Hyponatremia   -Mild, asymptomatic.  -Resolved with IV fluids.     Acute kidney injury  Metabolic acidosis  -Likely pre-renal in setting of infection.  -Avoid nephrotoxins.   -Continue IV fluids.   -FENA suggestive of pre-renal acute kidney injury.  -Nephrology consulted, appreciate their assistance. Nephrology signed off.  -Creatinine down to 1.32. Previous baseline was 1.1.  - Recheck labs in AM.     History of trigeminal neuralgia  Patient scheduled for rhizotomy in the coming week.  Patient did have negative COVID testing previously.  -Monitor.      Hypertension  Hyperlipidemia  Patient maintained on lisinopril and pravastatin prior to admission.  -Hold PTA lisinopril for now in setting of EVERETTE, consider resuming upon discharge.  -Continue PTA  pravastatin.     Depression/anxiety  -Continue PTA effexor and lexapro.     Dementia  -Continue PTA quetiapine.  -Discussed plan of care with her  again today.     GERD  -Continue PTA pantoprazole.     Diet: Full Liquid Diet    DVT Prophylaxis: Pneumatic Compression Devices  Waggoner Catheter: not present  Code Status: Full Code           Disposition Plan   Expected discharge: possibly back to assisted living facility with increased services tomorrow  Entered: Jordan Novak MD 06/19/2020, 9:06 AM       The patient's care was discussed with the Bedside Nurse, Patient and Patient's Family.    Jordan Novak MD  Hospitalist Service  [facility]    ______________________________________________________________________    Interval History   Olivia Rodriguez was seen this morning. Feels OK. No complaints for me. History from her is unreliable, discussed with nursing. Had some loose stools overnight, no melena or hematochezia.    Data reviewed today: I reviewed all medications, new labs and imaging results over the last 24 hours. I personally reviewed no images or EKG's today.    Physical Exam   Vital Signs: Temp: (P) 98  F (36.7  C) Temp src: (P) Oral BP: (P) 138/89   Heart Rate: (P) 79 Resp: 16 SpO2: (P) 94 % O2 Device: (P) None (Room air)    Weight: 123 lbs 0 oz  Constitutional: awake, alert, cooperative, no apparent distress  Respiratory: clear to auscultation bilaterally, no crackles or wheezing  Cardiovascular: regular rate and rhythm, normal S1 and S2, no murmur noted  GI: ormal bowel sounds, soft, non-distended, non-tender  Skin: warm, dry  Musculoskeletal: no lower extremity pitting edema present  Neurologic: awake, alert, not oriented    Data   Recent Labs   Lab 06/19/20  1013 06/18/20  1650 06/18/20  0852 06/17/20  0929 06/16/20  2027 06/15/20  1217   WBC 10.8  --  12.6* 16.2* 18.8* 5.9   HGB 10.2*  --  9.5* 11.4* 13.2 12.8   MCV 88  --  86 86 87 90     --  230 270 288 293   NA  142 137 138 131* 131* 137   POTASSIUM 3.6 3.6 3.1* 3.8 3.9 4.6   CHLORIDE 115* 110* 110* 105 99 106   CO2 23 22 22 16* 19* 20   BUN 21 32* 39* 51* 49* 30   CR 1.32* 1.50* 1.64* 2.38* 2.60* 1.73*   ANIONGAP 4 5 6 10 13 11   GUS 8.6 8.6 8.1* 8.6 9.3 9.0   GLC 96 88 81 104* 123* 99   ALBUMIN  --   --   --   --  3.6 3.7   PROTTOTAL  --   --   --   --  7.2  --    BILITOTAL  --   --   --   --  0.5  --    ALKPHOS  --   --   --   --  62  --    ALT  --   --   --   --  18  --    AST  --   --   --   --  18  --      Medications     sodium chloride 75 mL/hr at 06/19/20 0137       amoxicillin-clavulanate  1 tablet Oral Q12H     escitalopram  5 mg Oral Daily     iron sucrose (VENOFER) intermittent infusion  200 mg Intravenous Daily     pantoprazole  20 mg Oral QAM AC     pravastatin  40 mg Oral At Bedtime     sodium chloride (PF)  3 mL Intracatheter Q8H     venlafaxine  300 mg Oral Daily

## 2020-06-19 NOTE — PROGRESS NOTES
[facility]  Gastroenterology Progress Note     Olivia Rodriguez MRN# [id number]   YOB: 1945 Age: 75 year old          Assessment and Plan:    Jaci Rodriguez is a pleasant 75 year old female with dementia with complaints of diarrhea and abdominal pain. GI consulted for further evaluation      Active Problems:    Diarrhea    Generalized abdominal pain    Leukocytosis   Patient noted to have hemoccult positive loose stool and abdominal tenderness on exam. Infectious cause was considered and ruled out with enteric panel and C difficile. CT scan notes moderate colitis involving ascending, transverse and descending colitis. Patient refused colonoscopy prep on 6/17. Discussed doing colonoscopy today. Refusing prep.     - Unable to perform colonoscopy due to patient refusal  - Ok with GI to discharge patient on low fiber diet and see as an outpatient            Diarrhea, unspecified type  Hematochezia  Dehydration      Interval History:   no new complaints, denies chest pain, denies shortness of breath, alert, oriented to person, place and time and has had a bowel movement in the last 24 hours. ABdoinal pain improved.              Review of Systems:   C: NEGATIVE for fever, chills, change in weight  E/M: NEGATIVE for ear, mouth and throat problems  R: NEGATIVE for significant cough or SOB  CV: NEGATIVE for chest pain, palpitations or peripheral edema             Medications:   I have reviewed this patient's current medications    escitalopram  5 mg Oral Daily     iron sucrose (VENOFER) intermittent infusion  200 mg Intravenous Daily     pantoprazole  20 mg Oral QAM AC     piperacillin-tazobactam  2.25 g Intravenous Q6H     pravastatin  40 mg Oral At Bedtime     sodium chloride (PF)  3 mL Intracatheter Q8H     venlafaxine  300 mg Oral Daily                  Physical Exam:   Vitals were reviewed  Vital Signs with Ranges  Temp:  [97.7  F (36.5  C)-98.5  F (36.9  C)] 98  F (36.7  C)  Heart Rate:   [73-86] 79  Resp:  [16] 16  BP: (117-146)/(67-92) 138/89  SpO2:  [94 %-99 %] 94 %  I/O last 3 completed shifts:  In: 100 [P.O.:100]  Out: -   Constitutional: healthy, alert and no distress   Cardiovascular: negative, PMI normal. No lifts, heaves, or thrills. RRR. No murmurs, clicks gallops or rub  Respiratory: negative, Percussion normal. Good diaphragmatic excursion. Lungs clear  Head: Normocephalic. No masses, lesions, tenderness or abnormalities  Neck: Neck supple. No adenopathy. Thyroid symmetric, normal size,, Carotids without bruits.  Abdomen: Abdomen soft,tender. BS normal. No masses, organomegaly, positive findings: tenderness mild generalized           Data:   I reviewed the patient's new clinical lab test results.   Recent Labs   Lab Test 06/19/20  1013 06/18/20  0852 06/17/20  0929  02/17/17  1400  08/27/13  1438   WBC 10.8 12.6* 16.2*   < >  --    < > 6.7   HGB 10.2* 9.5* 11.4*   < > 12.9   < > 11.4*   MCV 88 86 86   < >  --    < > 90    230 270   < >  --    < > 361   INR  --   --   --   --  0.98  --  0.95    < > = values in this interval not displayed.     Recent Labs   Lab Test 06/19/20  1013 06/18/20  1650 06/18/20  0852   POTASSIUM 3.6 3.6 3.1*   CHLORIDE 115* 110* 110*   CO2 23 22 22   BUN 21 32* 39*   ANIONGAP 4 5 6     Recent Labs   Lab Test 06/16/20  2100 06/16/20  2027 06/15/20  1217 02/10/20  1603 01/24/20  1401 11/30/19  1350 11/30/19  1341  01/04/18  1041   ALBUMIN  --  3.6 3.7 4.0 4.1  --  4.0   < >  --    BILITOTAL  --  0.5  --  0.6 0.5  --  0.4   < >  --    ALT  --  18  --  28 18  --  26   < >  --    AST  --  18  --  22 13  --  16   < >  --    PROTEIN 30*  --   --   --   --  Negative  --   --  100*   LIPASE  --   --   --  443*  --   --  286  --   --     < > = values in this interval not displayed.       I reviewed the patient's new imaging results.    All laboratory data reviewed  All imaging studies reviewed by me.    Lynn Ruiz PA-C,  6/19/2020  [facility]  Consultants  Office : [phone]  Cell: [phone] (Dr. Galloway)  Cell: [phone] (Lynn Ruiz PA-C)

## 2020-06-19 NOTE — PLAN OF CARE
Cognitive Concerns/ Orientation : Alert, oriented to self, forgetful. Calm and cooperative, but restless at times.   BEHAVIOR & AGGRESSION TOOL COLOR: green   CIWA SCORE: n/a  ABNL VS/O2: VSS on RA  MOBILITY: Up w/ 1, GB   PAIN MANAGMENT: denies pain  DIET: Advanced to low fiber diet. Tolerating, but poor appetite, only ate a couple bites of breakfast and lunch.   BOWEL/BLADDER: incontinent at times, but does get up to the bathroom. Loose stool x1, brown, no signs of bleeding.    ABNL LAB/BG: Creat 1.32; Hgb 10.2  DRAIN/DEVICES: PIV, IVF infusing  TELEMETRY RHYTHM: NSR  SKIN: bruises   TESTS/PROCEDURES: was scheduled for colonoscopy yesterday, but refused to drink prep.   D/C DAY/GOALS/PLACE: back to [facility], likely tomorrow  OTHER IMPORTANT INFO: BS active, denies abd pain/nausea. Tolerating full liquids, but poor appetite despite encouragement; advanced to low fiber diet. Calm and cooperative this morning, more restless this afternoon, PRN Seroquel given x1.

## 2020-06-19 NOTE — PROGRESS NOTES
DATE & TIME: 6/18/2020 5486-6483  Cognitive Concerns/ Orientation : Alert to self only, baseline dementia.  BEHAVIOR & AGGRESSION TOOL COLOR: Green-yellow. Redirectable. Frequently hops up from the chair, insisting she had to leave, Seroquel given  CIWA SCORE: N/a  ABNL VS/O2: VSS on RA  MOBILITY: Ax1-2, GB. Up to the chair and BSC.   PAIN MANAGMENT: Denies  DIET:clear liquid diet, but very poor oral intake. Takes only sips of water or juice.   BOWEL/BLADDER: Incontinent of B&B. Loose BM x 1 this yordan  ABNL LAB/BG: K+ recheck 3.6  DRAIN/DEVICES: PIV infusing, NS @ 75 ml/hr.   TELEMETRY RHYTHM: SR  SKIN: Intact ex. Scrap on R knee, Flaky skin on bottom of BLE   TESTS/PROCEDURES: n/a  D/C DAY/GOALS/PLACE: TBD, GI is following. Nephrology signed off. Continues on IV antibiotic, zosyn q6hr. Per last GI note, pt needs colonoscopy but pt unable to drink the prep. Writer called GI to follow up and still awaiting response. PT is following, recommend home with HHA. Pt was started on Iron infusions today for anemia.   OTHER IMPORTANT INFO:n/a  COMMIT TO SIT DONE AND SIGNED OFF YES

## 2020-06-19 NOTE — PLAN OF CARE
Discharge Planner PT     Patient plan for discharge: Per spouse, return to [facility] with him with continued assist, state additional assist from [facility] could be arranged if needed.    Current status: Pt was received sitting up in bs chair. Confused, however follows commands. STS with SBA and verbal cues for hand placement. Pt ambulated 160 ft using 1UE supported in IV pole and SBA. Pt participated in LE there ex with PTs guidance and demo. Pt is left in chair with all needs within reach and chair alarm engaged.      Barriers to return to prior living situation: fatigues quickly, limited ambulation distance    Recommendations for discharge: Home with HHA for all ambulation and transfers, resumed assist for ADLs with additional assist for toileting    Rationale for recommendations: Patient typically ambulates with HHA of spouse & has assist with most ADLs. Anticipate pt will need additional assist with toileting as well as resumed level of assist upon return home.        Entered by: Nisha Solis 06/19/2020 11:52 AM

## 2020-06-19 NOTE — PROGRESS NOTES
Patient is clinically stable unchanged patient did not take prep yesterday patient is overall stable patient does have findings consistent with colitis.  Patient has significantly elevated procalcitonin.  At this point I will recommend to continue on supportive care and continue to monitor once patient is ready for colonoscopy prep we will proceed with colonoscopy.    [facility] Galloway GI

## 2020-06-19 NOTE — PLAN OF CARE
DATE & TIME: 6/18-19/2020  overnight    Cognitive Concerns/ Orientation : A+O x 1, impulsively gets up from chair.  BEHAVIOR & AGGRESSION TOOL COLOR: yellow for confusion and refusal of care  CIWA SCORE: NA   ABNL VS/O2: VSS on RA.   MOBILITY: 1 x assist with gait belt.  PAIN MANAGMENT: denies having pain  DIET: full liquid diet, but poor appetite  BOWEL/BLADDER: incontinent, but makes effort to go on toilet. Fidgets around a lot before sitting on the pot and ends up getting stool and urine on the ground from not being properly on it. Is wearing a brief.   ABNL LAB/BG: potassium is at 3.6 and up from yesterday. Still waiting on today's labs  DRAIN/DEVICES: PIV in left arm  TELEMETRY RHYTHM: NSR  SKIN: refused to let me look at most of it. Looked fine from what I could see.   TESTS/PROCEDURES: needed a colonoscopy yesterday, but refused. Need new plan.  D/C DAY/GOALS/PLACE: possibly 2-3 days discharge to previous living arrangement.   OTHER IMPORTANT INFO: patient refused to take her pravastatin last night. Was stand-offish at the beginning of the shift. Later this morning was more agreeable.  MD/RN ROUNDING SIGNED OFF D/E SHIFT:   COMMIT TO SIT DONE AND SIGNED OFF: yes

## 2020-06-19 NOTE — DISCHARGE INSTRUCTIONS
Your doctor has ordered home care to help you after your hospital stay.  They will contact you regarding your 1st visit.  The service will be provided by [facility].  If you have not received a call within 48hrs of discharge, please call them at [phone]    Follow up with GI as outpatient:  Lynn Ruiz PA-C,  6/19/2020  [facility]  Office : [phone]  Cell: [phone] (Dr. Galloway)  Cell: [phone] (Lynn Ruiz PA-C)

## 2020-06-20 PROBLEM — B99.9 INTRA-ABDOMINAL INFECTION: Status: ACTIVE | Noted: 2020-01-01

## 2020-06-20 NOTE — PLAN OF CARE
DATE & TIME: 2300-0730  Cognitive Concerns/ Orientation : Alert, oriented to self, forgetful. Calm and cooperative, but impulsive-easily redirectable.   BEHAVIOR & AGGRESSION TOOL COLOR: green   CIWA SCORE: n/a  ABNL VS/O2: VSS on RA ex some HTN  MOBILITY: A1 GB   PAIN MANAGMENT: denies pain  DIET: Low fiber diet. Tolerating, but very poor appetite, only ate a couple bites of evening meal  BOWEL/BLADDER: incontinent at times, but does get up to the bathroom. Loose stool x2, brown/light green, no signs of bleeding.    ABNL LAB/BG: Creat 1.32; Hgb 10.2  DRAIN/DEVICES: PIV infusing NS at 75cc/hr  TELEMETRY RHYTHM: Pt removed tele  SKIN: bruises   TESTS/PROCEDURES: N/A   D/C DAY/GOALS/PLACE: back to [facility], likely today  OTHER IMPORTANT INFO: PO Augmentin

## 2020-06-20 NOTE — DISCHARGE SUMMARY
[facility]  Hospitalist Discharge Summary      Date of Admission:  6/16/2020  Date of Discharge:  6/20/2020 10:39 AM  Discharging Provider: Tong Blackmon MD      Discharge Diagnoses    1. Diarrhea and abdominal pain - improving  2. Possible ischemic colitis  3. Hyponatremia - resolved  4. H/O trigeminal neuralgia  5. HTN  6. HL  7. Depression/anxiety  8. Dementia  9. GERD    Follow-ups Needed After Discharge   Follow-up Appointments     Follow-up and recommended labs and tests       PCP in 3-5 days for hospital follow up with BMP and CBC  [facility]           Unresulted Labs Ordered in the Past 30 Days of this Admission     Date and Time Order Name Status Description    6/17/2020 1008 Blood culture Preliminary     6/17/2020 1008 Blood culture Preliminary       These results will be followed up by NA    Discharge Disposition   Discharged to home with home health  Condition at discharge: Stable    Olivia Rodriguez is a 75 year old female who presents with diarrhea. Admitted for further evaluation and treatment.      Diarrhea  Leukocytosis  Abdominal tenderness  Suspected ischemic colitis  Patient reportedly with diarrhea and possible vaginal or rectal bleeding on initial presentation, however, no blood identified. Denied abdominal pain, but has significant abdominal tenderness. Stool hemoccult positive.  - C diff and Enteric panel were negative.  - Procalcitonin significantly elevated at 19.23, trended down to 3.94 on 6/18.  - CT abdomen/pelvis showed moderate colitis involving the ascending, transverse, and descending colon.  - GI consulted, appreciate their assistance.  - Patient refused colonoscopy prep, so will not proceed with colonoscopy at this time.  - Stop empiric Zosyn and transition to oral Augmentin.  - Advance to low fiber diet.  - Possible discharge back to assisted living facility tomorrow with outpatient GI follow-up if she tolerates advancing her diet and switching to oral  antibiotics. Continue oral antibiotics for another ~6 days for 10 day course.   - GI follow up and PCP with CBC/BMP in the next several days.  - Home health RN/PT/HHA arranged     Hyponatremia   - Mild, asymptomatic.  - Resolved with IV fluids.     Acute kidney injury  Metabolic acidosis  - Likely pre-renal in setting of infection.  - Avoid nephrotoxins.   - Continue IV fluids.   - FENA suggestive of pre-renal acute kidney injury.  - Nephrology consulted, appreciate their assistance. Nephrology signed off.  - Creatinine down to 1.32. Previous baseline was 1.1.  - Recheck labs in AM.     History of trigeminal neuralgia  Patient scheduled for rhizotomy in the coming week.  Patient did have negative COVID testing previously.  - Monitor.      Hypertension  Hyperlipidemia  Patient maintained on lisinopril and pravastatin prior to admission.  - Hold PTA lisinopril for now in setting of EVERETTE. Creat normalizing, lisinopril resumed at discharge. BMP with hospital follow up.  - Continue PTA pravastatin.     Depression/anxiety  - Continue PTA effexor and lexapro.     Dementia  - Continue PTA quetiapine.  - Discussed plan of care with her  again today.     GERD  - Continue PTA pantoprazole.    Hospital Course       Consultations This Hospital Stay   NEPHROLOGY IP CONSULT  GASTROENTEROLOGY IP CONSULT  PHYSICAL THERAPY ADULT IP CONSULT  OCCUPATIONAL THERAPY ADULT IP CONSULT  CARE TRANSITION RN/SW IP CONSULT    Code Status   Full Code    Time Spent on this Encounter   I, Tong Blackmon MD, personally saw the patient today and spent greater than 30 minutes discharging this patient.       Tong Blackmon MD  [facility]  ______________________________________________________________________    Physical Exam   Vital Signs: Temp: 98.3  F (36.8  C) Temp src: Oral BP: (!) 151/89   Heart Rate: 77 Resp: 16 SpO2: 94 % O2 Device: None (Room air)    Weight: 123 lbs 0 oz    Gen: NAD, pleasantly confused  HEENT:  Normocephalic, EOMI, MMM  Resp: no crackles,  no wheezes, no increased work of resp  CV: S1S2 heard, reg rhythm, reg rate  Abdo: soft, nontender, nondistended, bowel sounds present  Ext: calves nontender, well perfused  Neuro: AAOx self2, CN grossly intact, no facial asymmetry         Primary Care Physician   Ame Villalobos    Discharge Orders      Home care nursing referral      Home Care PT Referral for Hospital Discharge      Reason for your hospital stay    Diarrhea and possible bleeding     Follow-up and recommended labs and tests     PCP in 3-5 days for hospital follow up with BMP and [facility]     Activity    Your activity upon discharge: activity as tolerated with assistance     Discharge Instructions    Follow up with PCP in 3-5 days with blood test also.   Follow up with GI - Dr Galloway's office     MD face to face encounter    Documentation of Face to Face and Certification for Home Health Services    I certify that patient: Olivia Rodriguez is under my care and that I, or a nurse practitioner or physician's assistant working with me, had a face-to-face encounter that meets the physician face-to-face encounter requirements with this patient on: 6/20/2020.    This encounter with the patient was in whole, or in part, for the following medical condition, which is the primary reason for home health care: dementia, deconditioning.    I certify that, based on my findings, the following services are medically necessary home health services: Nursing and Physical Therapy.    My clinical findings support the need for the above services because: Nurse is needed: To provide assessment and oversight required in the home to assure adherence to the medical plan due to: dementia.. and Physical Therapy Services are needed to assess and treat the following functional impairments: deconditioning and dementia.    Further, I certify that my clinical findings support that this patient is homebound (i.e. absences from home  require considerable and taxing effort and are for medical reasons or Alevism services or infrequently or of short duration when for other reasons) because: patient is elderly and has dementia and is deconditioned.    Based on the above findings. I certify that this patient is confined to the home and needs intermittent skilled nursing care, physical therapy and/or speech therapy.  The patient is under my care, and I have initiated the establishment of the plan of care.  This patient will be followed by a physician who will periodically review the plan of care.  Physician/Provider to provide follow up care: Ame Villalobos    Attending hospital physician (the Medicare certified [place] provider): Tong Blackmon MD  Physician Signature: See electronic signature associated with these discharge orders.  Date: 6/20/2020     Full Code     Diet    Follow this diet upon discharge: Orders Placed This Encounter      Low Fiber Diet       Significant Results and Procedures   Most Recent 3 CBC's:  Recent Labs   Lab Test 06/20/20  0758 06/19/20  1013 06/18/20  0852   WBC 8.9 10.8 12.6*   HGB 9.2* 10.2* 9.5*   MCV 88 88 86    285 230     Most Recent 3 BMP's:  Recent Labs   Lab Test 06/20/20  0758 06/19/20  1013 06/18/20  1650    142 137   POTASSIUM 3.5 3.6 3.6   CHLORIDE 112* 115* 110*   CO2 22 23 22   BUN 12 21 32*   CR 1.05* 1.32* 1.50*   ANIONGAP 6 4 5   GUS 7.9* 8.6 8.6   GLC 71 96 88     Most Recent 2 LFT's:  Recent Labs   Lab Test 06/16/20  2027 02/10/20  1603   AST 18 22   ALT 18 28   ALKPHOS 62 83   BILITOTAL 0.5 0.6     Most Recent 6 Bacteria Isolates From Any Culture (See EPIC Reports for Culture Details):  Recent Labs   Lab Test 06/17/20  1050 06/17/20  1037 11/30/19  1413 12/26/16  1104 09/04/15  0955 11/14/14  1154   CULT No growth after 3 days No growth after 3 days 10,000 to 50,000 colonies/mL  mixed urogenital liliam  Susceptibility testing not routinely done   >100,000 colonies/mL Enterococcus  species  <10,000 colonies/mL mixed urogenital liliam Susceptibility testing not routinely   done  * >100,000 colonies/mL Proteus mirabilis* >100,000 colonies/mL Klebsiella pneumoniae*     Most Recent Urinalysis:  Recent Labs   Lab Test 06/16/20  2100 11/30/19  1350   COLOR Yellow Yellow   APPEARANCE Slightly Cloudy Clear   URINEGLC Negative Negative   URINEBILI Negative Negative   URINEKETONE 5* Negative   SG 1.019 1.025   UBLD Negative Negative   URINEPH 5.0 5.5   PROTEIN 30* Negative   UROBILINOGEN  --  0.2   NITRITE Negative Negative   LEUKEST Negative Small*   RBCU 1 O - 2   WBCU 1 0 - 5   ,   Results for orders placed or performed during the hospital encounter of 06/16/20   XR Chest Port 1 View    Narrative    CHEST PORTABLE ONE VIEW June 17, 2020 9:30 AM     HISTORY: Rule out pneumonia.    COMPARISON: None.      Impression    IMPRESSION: Portable chest. Lungs are clear. Heart is normal in size.  No pneumothorax. No definite pleural effusions. Elevation right  hemidiaphragm is unchanged. Aorta is calcified without obvious  aneurysmal dilatation.    SHAHRIAR SPANGLER MD   CT Abdomen Pelvis w/o Contrast    Narrative    CT ABDOMEN PELVIS W/O CONTRAST 6/17/2020 11:15 AM    CLINICAL HISTORY: Abd pain, acute, generalized  TECHNIQUE: CT scan of the abdomen and pelvis was performed without IV  contrast. Multiplanar reformats were obtained. Dose reduction  techniques were used.  CONTRAST: None.    COMPARISON: 2/10/2020    FINDINGS:   LOWER CHEST: Normal.    HEPATOBILIARY: Cholelithiasis. Stable benign hepatic cysts.    PANCREAS: Normal.    SPLEEN: Normal.    ADRENAL GLANDS: Normal.    KIDNEYS/BLADDER: Normal.    BOWEL: New marked colonic wall thickening involving the ascending,  transverse, and descending colon. The sigmoid colon is more normal in  appearance. There is inflammation surrounding the abnormal colon. No  abscess or extraluminal gas.    LYMPH NODES: Normal.    VASCULATURE: Unremarkable.    PELVIC ORGANS:  Hysterectomy.    OTHER: Minimal free fluid in the pelvis.    MUSCULOSKELETAL: Normal.      Impression    IMPRESSION:   1.  New moderate colitis involving the ascending, transverse, and  descending colon.    NIKKIE RODRIGUEZ MD       Discharge Medications   Discharge Medication List as of 6/20/2020 10:12 AM      START taking these medications    Details   amoxicillin-clavulanate (AUGMENTIN) 875-125 MG tablet Take 1 tablet by mouth every 12 hours for 6 days, Disp-12 tablet,R-0, E-Prescribe         CONTINUE these medications which have NOT CHANGED    Details   Aspirin-Acetaminophen-Caffeine (EXCEDRIN PO) Take 1 tablet by mouth every morning (for morning headache), Historical      cholecalciferol (VITAMIN D3) 1000 units (25 mcg) capsule Take 1 capsule by mouth daily, Historical      escitalopram (LEXAPRO) 5 MG tablet Take 1 tablet (5 mg) by mouth daily, Disp-90 tablet, R-3, E-PrescribeIn addition to Effexor      Fexofenadine HCl (ALLEGRA PO) Take 180 mg by mouth daily , Historical      lisinopril (PRINIVIL/ZESTRIL) 40 MG tablet Take 1 tablet (40 mg) by mouth daily For high Blood Pressure, Disp-90 tablet, R-3, E-Prescribe      pantoprazole (PROTONIX) 20 MG EC tablet Take 1 tablet (20 mg) by mouth daily, Disp-90 tablet, R-1, E-Prescribe      pravastatin (PRAVACHOL) 40 MG tablet Take 1 tablet (40 mg) by mouth At Bedtime For high cholestrol, Disp-90 tablet, R-3, E-Prescribe      QUEtiapine (SEROQUEL) 25 MG tablet Take 0.5 tablets (12.5 mg) by mouth 2 times daily as needed, Disp-20 tablet,R-1, E-Prescribe      senna-docusate (SENOKOT-S/PERICOLACE) 8.6-50 MG tablet Take 1 tablet by mouth 2 times daily as needed for constipation, OTC      venlafaxine (EFFEXOR XR) 150 MG 24 hr capsule Take 2 capsules (300 mg) by mouth daily, Disp-180 capsule, R-3, E-Prescribe           Allergies   Allergies   Allergen Reactions     Seafood Anaphylaxis     Femara [Letrozole] Rash     Latex Rash     WITH LATEX BANDAIDS     Tamoxifen Rash

## 2020-06-20 NOTE — PROGRESS NOTES
Called and notified [facility] at  [phone]. I spoke with Serenity, she understands patient returning today and orders will be faxed.     1002 orders faxed to [facility] at [phone]. Bedside RN aware and will connect with spouse via phone to coordinate discharge timing.

## 2020-06-20 NOTE — PLAN OF CARE
Discharge    Patient discharged back to [facility] via wheelchair through skyway with spouse  Care plan note:  Cognitive Concerns/ Orientation : Alert, oriented to self, forgetful. Calm and cooperative, but restless at times.   BEHAVIOR & AGGRESSION TOOL COLOR: green   CIWA SCORE: n/a  ABNL VS/O2: VSS on RA  MOBILITY: Up w/ 1, GB   PAIN MANAGMENT: denies pain  DIET: Low fiber diet. Tolerating, but poor appetite, only ate a couple bites of breakfast.  BOWEL/BLADDER: incontinent at times, but does get up to the bathroom. Loose stool x1, brown/green, no signs of bleeding.    ABNL LAB/BG: Creat 1.05; Hgb 9.2  DRAIN/DEVICES: n/a  TELEMETRY RHYTHM: pt removed tele and refused to have it replaced.   SKIN: bruises   TESTS/PROCEDURES: n/a  D/C DAY/GOALS/PLACE: back to [facility] today  OTHER IMPORTANT INFO: BS active, denies abd pain/nausea. Tolerating low fiber diet, but poor appetite despite encouragement. AVS and discharge information discussed over the phone with pts spouse Alejandro.      Listed belongings gathered and returned to patient. Yes  Care Plan and Patient education resolved: Yes  Prescriptions if needed, hard copies sent with patient  NA  Home and hospital acquired medications returned to patient: Yes  Medication Bin checked and emptied on discharge Yes  Follow up appointment made for patient: Yes

## 2020-06-22 NOTE — TELEPHONE ENCOUNTER
Chief Complaint: Diarrhea, Unspecified Type, Intra-Abdominal Infection,  SAT 20-JUN-2020 2 / 1    [phone] (home)

## 2020-06-22 NOTE — TELEPHONE ENCOUNTER
"ED / Discharge Outreach Protocol    Patient Contact    Attempt # 1    Was call answered?  Yes.  \"May I please speak with <patient name>\"  Is patient available?   No.  Spoke with patient's  who was on another line with a nurse for hospital follow up.    MARIANNA StormN, RN  [employer] Triage    "

## 2020-06-23 NOTE — PROGRESS NOTES
Home Care Admission  SN: 1 wk 1, 2 wk 3, 1 PRN  For: GI/, neuro and nutrition assessment and education.    PT: 1 visit to eval and treat  For strength, gait and transfer training    Declined HHA and OT services which were recommended.  Thank you,  Marsha Nayak RN  [phone]

## 2020-06-23 NOTE — PROGRESS NOTES
"Olivia Rodriguez is a 75 year old female who is being evaluated via a billable video visit.      The patient has been notified of following:     \"This video visit will be conducted via a call between you and your physician/provider. We have found that certain health care needs can be provided without the need for an in-person physical exam.  This service lets us provide the care you need with a video conversation.  If a prescription is necessary we can send it directly to your pharmacy.  If lab work is needed we can place an order for that and you can then stop by our lab to have the test done at a later time.    Video visits are billed at different rates depending on your insurance coverage.  Please reach out to your insurance provider with any questions.    If during the course of the call the physician/provider feels a video visit is not appropriate, you will not be charged for this service.\"    Patient has given verbal consent for Video visit? Yes  Doximity [phone]    Will anyone else be joining your video visit?     Subjective     Olivia Rodriguez is a 75 year old female who presents today via video visit for the following health issues:    Hospitals in [place]    Hospital Follow-up Visit:    Hospital/Nursing Home/IP Rehab Facility: [facility]  Date of Admission: 06/16/2020  Date of Discharge: 06/20/2020  Reason(s) for Admission:     1. Diarrhea and abdominal pain - improving  2. Possible ischemic colitis  3. Hyponatremia - resolved  4. H/O trigeminal neuralgia  5. HTN  6. HL  7. Depression/anxiety  8. Dementia  9. GERD           Was your hospitalization related to COVID-19? No   Problems taking medications regularly:  None  Medication changes since discharge: None  Problems adhering to non-medication therapy:  None    Summary of hospitalization:  [facility] discharge summary reviewed  Diagnostic Tests/Treatments reviewed.  Follow up needed: yes   Other Healthcare Providers Involved in Patient s " Care:         Homecare  Update since discharge: improved.       Post Discharge Medication Reconciliation: discharge medications reconciled and changed, per note/orders (see AVS).  Plan of care communicated with               Video Start Time: 2:08 PM    Patient and her   Minimal communication with patient as she has dementia  She was lying in bed comfortably  Overall her symptoms are improving  Her oral intake is not good   is trying his best to improve it. keeping her well-hydrated     Home care nurse visited them  Physical therapy will be visiting them    Patient Active Problem List   Diagnosis     Major depressive disorder, recurrent episode, moderate (H)     TMJ (temporomandibular joint syndrome)     S/P total knee arthroplasty     S/P HEATHER-BSO (total abdominal hysterectomy and bilateral salpingo-oophorectomy)     Sensation of feeling cold     Health Care Home     Anxiety     Controlled substance agreement signed     Essential hypertension     Coronary artery calcification     Lung nodule     Trigeminal neuralgia     Status post total bilateral knee replacement     Incisional hernia     Dementia without behavioral disturbance, unspecified dementia type (H)     Personal history of malignant neoplasm of breast     Mixed hyperlipidemia     CKD (chronic kidney disease) stage 3, GFR 30-59 ml/min (H)     Gastroesophageal reflux disease without esophagitis     Diarrhea     Intra-abdominal infection     Past Surgical History:   Procedure Laterality Date     APPENDECTOMY      during c section     BIOPSY  13    right breast biopsy     BIOPSY  13    right axillary lymph node     BREAST SURGERY  13    right breast partial mastectomy/lumpectomy      SECTION       COLONOSCOPY      She had colonoscopy and endoscopy done on November 15, 2012 for workup of iron deficiency anemia and the results were satisfactory     EYE SURGERY  2014    cataracts     GYN SURGERY            HEAD & NECK SURGERY      surgery for Trigeminal neuralgia       HYSTERECTOMY TOTAL ABDOMINAL, BILATERAL SALPINGO-OOPHORECTOMY, COMBINED  2006     LAPAROSCOPIC HERNIORRHAPHY INCISIONAL N/A 4/10/2017    Procedure: LAPAROSCOPIC HERNIORRHAPHY INCISIONAL;  Surgeon: Dayanara Kendall MD;  Location: [facility]     MASTECTOMY PARTIAL WITH SENTINEL NODE  8/20/2013    Procedure: MASTECTOMY PARTIAL WITH SENTINEL NODE;  RIGHT PARTIAL MASTECTOMY WITH RIGHT AXILLARY SENTINEL NODE BIOPSY;  Surgeon: Kae Padilla MD;  Location: [facility]     ORTHOPEDIC SURGERY      Right TKA and Left TKA       Social History     Tobacco Use     Smoking status: Never Smoker     Smokeless tobacco: Never Used   Substance Use Topics     Alcohol use: Yes     Alcohol/week: 1.0 standard drinks     Comment: 1 or 2 glasses of wine a week     Family History   Problem Relation Age of Onset     Cardiovascular Father      Hypertension Father      Hyperlipidemia Father      Alzheimer Disease Mother      Depression Mother      Genetic Disorder Mother         Alzheimer's symptoms     Cancer - colorectal No family hx of      Breast Cancer No family hx of          Current Outpatient Medications   Medication Sig Dispense Refill     amoxicillin-clavulanate (AUGMENTIN) 875-125 MG tablet Take 1 tablet by mouth every 12 hours for 6 days 12 tablet 0     Aspirin-Acetaminophen-Caffeine (EXCEDRIN PO) Take 1 tablet by mouth every morning (for morning headache)       cholecalciferol (VITAMIN D3) 1000 units (25 mcg) capsule Take 1 capsule by mouth daily       escitalopram (LEXAPRO) 5 MG tablet Take 1 tablet (5 mg) by mouth daily 90 tablet 3     Fexofenadine HCl (ALLEGRA PO) Take 180 mg by mouth daily        lisinopril (PRINIVIL/ZESTRIL) 40 MG tablet Take 1 tablet (40 mg) by mouth daily For high Blood Pressure 90 tablet 3     pantoprazole (PROTONIX) 20 MG EC tablet Take 1 tablet (20 mg) by mouth daily 90 tablet 1     pravastatin (PRAVACHOL) 40 MG tablet Take 1 tablet (40 mg) by  "mouth At Bedtime For high cholestrol 90 tablet 3     QUEtiapine (SEROQUEL) 25 MG tablet Take 0.5 tablets (12.5 mg) by mouth 2 times daily as needed 20 tablet 1     senna-docusate (SENOKOT-S/PERICOLACE) 8.6-50 MG tablet Take 1 tablet by mouth 2 times daily as needed for constipation       venlafaxine (EFFEXOR XR) 150 MG 24 hr capsule Take 2 capsules (300 mg) by mouth daily 180 capsule 3       Reviewed and updated as needed this visit by Provider         Review of Systems   Constitutional, HEENT, cardiovascular, pulmonary, gi and gu systems are negative, except as otherwise noted.      Objective    LMP 09/01/2000 (Exact Date)   Estimated body mass index is 21.11 kg/m  as calculated from the following:    Height as of 6/15/20: 1.626 m (5' 4\").    Weight as of 6/19/20: 55.8 kg (123 lb).  Physical Exam     GENERAL: Healthy, alert and no distress .she was lying in bed comfortably    EYES: Eyes grossly normal to inspection.  No discharge or erythema, or obvious scleral/conjunctival abnormalities.  RESP: No audible wheeze, cough, or visible cyanosis.  No visible retractions or increased work of breathing.    SKIN: Visible skin clear. No significant rash, abnormal pigmentation or lesions.   appearance well-groomed.  Memory is fair          Assessment & Plan     Olivia was seen today for hospital f/u.    Diagnoses and all orders for this visit:    Ischemic colitis (H)  All her symptoms have improved  She has follow-up appointment with GI  Encouraged her to stay well-hydrated  Gave some ideas to improve her nutrition  As her oral intake has gone down I recommended whatever she likes it is okay to eat  At this point I worry more about her intake then healthy food  Stay well-hydrated      Trigeminal neuralgia  She was supposed to have a procedure done today for trigeminal neuralgia but it was canceled due to recent hospitalization    Late onset Alzheimer's disease with behavioral disturbance (H)  Lives with her    " is a caregiver  He is very supportive        CKD (chronic kidney disease) stage 3, GFR 30-59 ml/min (H)    We will continue to monitor we may repeat labs in 4 weeks    We will order labs in 4 weeks     Disclaimer: This note consists of symbols derived from keyboarding, dictation and/or voice recognition software. As a result, there may be errors in the script that have gone undetected. Please consider this when interpreting information found in this chart.      See Patient Instructions  Patient Instructions   Continue present management   Follow up in 4 weeks  Seek sooner medical attention if there is any worsening of symptoms or problems.        Return in about 1 month (around 7/23/2020) for medication follow up.    Ame Villalobos MD  [facility]      Video-Visit Details    Type of service:  Video Visit    Video End Time:2:219PM    Originating Location (pt. Location): Home    Distant Location (provider location):  [facility]     Platform used for Video Visit: doximity    Return in about 1 month (around 7/23/2020) for medication follow up.        Ame Villalobos MD

## 2020-06-23 NOTE — PATIENT INSTRUCTIONS
Continue present management   Follow up in 4 weeks  Seek sooner medical attention if there is any worsening of symptoms or problems.

## 2020-06-24 NOTE — PROGRESS NOTES
"Olivia Rodriguez is a 75 year old female who is being evaluated via a billable video visit.      The patient has been notified of following:     \"This video visit will be conducted via a call between you and your physician/provider. We have found that certain health care needs can be provided without the need for an in-person physical exam.  This service lets us provide the care you need with a video conversation.  If a prescription is necessary we can send it directly to your pharmacy.  If lab work is needed we can place an order for that and you can then stop by our lab to have the test done at a later time.    Video visits are billed at different rates depending on your insurance coverage.  Please reach out to your insurance provider with any questions.    If during the course of the call the physician/provider feels a video visit is not appropriate, you will not be charged for this service.\"    Patient has given verbal consent for Video visit? Yes    Will anyone else be joining your video visit? Yes,  Kevin facilitating     Subjective     Olivia Rodriguez is a 75 year old female who presents today via video visit accompanied by her  Kevin in their apartment at [place].  She has advanced dementia and Kevin does a lot of caregiving for her. She has been losing weight for the past several months and had a flare up of her trigeminal neuralgia. She was just hospitalized with ischemic colitis after on-going diarrhea, intermittent abdominal pain, poor appetite and weight loss culminated with some new onset self-limited bloody stools. She is finishing Augmentin in a couple days and will f/u with Dr. Galloway of GI next Tuesday in his clinic. Olivia recalls the hospitalization as \"terrible\" but can't expand upon that. Hard to be  from  as they are always together and with COVID-19 visitor restrictions he wasn't able to visit her. She says she is no longer having abdominal pain but reports some " on-going diarrhea to me that  wasn't aware of; its unsure if her history on this piece is accurate so he will evaluate BMs now going forward. She is still struggling with poor appetite but is drinking fluids well. Overall lost about 40# in the past several months. BPs recorded in hospital 140-150s and home health RN noted high enough reading today; remains on Lisinopril (other anti-hypertensives have been discontinued given low BPs with her weight loss). Her Aricept was stopped in February given her weight loss and neither of them noticed a difference without it. Have not needed any of the PRN Seroquel. She still gets days and nights mixed up but is able to be redirected. She perseverates today on going to get her winter jacket but doesn't feel cold; ultimately gets up from call and gets it, puts it on and sits back down again.                 Objective       Alert, well groomed, NAD  Easily distracted from conversation with lack of insight into visit  Seems comfortable in her home environment  Breathing non-labored, no cough      Assessment & Plan     1. Dementia without behavioral disturbance   Olivia has advancing dementia, likely of the Alzheimer's type with comorbid long-standing depression as well as family h/o Alzheimer's disease in her mother. Aricept discontinued Feb 2020 with weight loss noted and no negative effects with discontinuation of it so we discussed today remaining off of this medication and Kevin is in agreement with that plan. MoCA 8/30 back in June 2018. Fully dependent on cares by her doting  Kevin. See adjustment in anti-depressant below. Discussed recent hospital stay for ischemic colitis. Discussed if she continues to lose weight even after ischemic colitis supposedly resolved, would discuss hospice in more detail and Kevin would be ok with it at that time but isn't ready now. They haven't needed PRN Seroquel and Kevin uses redirection techniques often. They do have supportive  children in their lives.    2. Major depressive disorder, recurrent episode, moderate (H)  3. Weight loss  Long standing depression on Effexor for years  Lexapro newer adjunct and may or may not be helpful  However, will opt for trial starting Remeron to help with her sleep patterns and appetite  Discontinue Lexapro; can continue on Effexor with the Remeron  Trial of 30 day supply Remeron to local pharmacy and if tolerated, Kevin will let me know to send longer script to [facility]  - mirtazapine (REMERON) 15 MG tablet; Take 1 tablet (15 mg) by mouth every evening  Dispense: 30 tablet; Refill: 0    4. Ischemic colitis (H)  Kevin will monitor bowels to be able to give accurate history at Dr. Galloway follow up next Tuesday  Has a couple days left of Augmentin therapy    5. Trigeminal neuralgia  Was to have elective outpatient rhizotomy but hospitalized in meantime      Video-Visit Details    Type of service:  Video Visit    Video Start Time: 1:14 PM  Video End Time:1:48 PM   (Total time 34 minutes)    Originating Location (pt. Location): Home    Distant Location (provider location):  [facility]     Platform used for Video Visit: Doximity      Follow up with PCP Dr. Villalobos as scheduled 7/23/20; in the next few months as needed in memory clinic    Hilary Ledezma DO  [facility]

## 2020-06-24 NOTE — Clinical Note
Thanks for the note yesterday! I discontinued her Lexapro and started Remeron today. Hope that is ok with you as well. I see you have a f/up scheduled 7/23/20 and I can follow up later this summer. I brought up idea of hospice if she doesn't turn around and Kevin was receptive but not yet ready at this time.

## 2020-06-26 NOTE — PROGRESS NOTES
I have not received a response for requested home care orders. It's possible I entered them incorrectly? Here are the requested orders:    SN: 1 wk 1, 2 wk 3, 1 PRN  For: GI/, neuro and nutrition assessment and education.     PT: 1 visit to eval and treat  For strength, gait and transfer training     Declined HHA and OT services which were recommended.  Thank you,  Marsha Nayak RN  [phone]

## 2020-07-13 NOTE — TELEPHONE ENCOUNTER
Reason for Call:  Home Health Care    Bernardo with [facility] called regarding (reason for call):     Orders are needed for this patient.     PT:     OT: eval and treat    Skilled Nursing:     Pt Provider: Wilfredo    Phone Number Homecare Nurse can be reached at: [phone]    Can we leave a detailed message on this number? YES    Phone number patient can be reached at:     Best Time: any    Call taken on 7/13/2020 at 11:51 AM by Jimmie Lubin

## 2020-07-13 NOTE — TELEPHONE ENCOUNTER
Returned call. OK'd requested orders.  Orders will be faxed to PCP for review and signature.   Arleen Madrigal RN

## 2020-07-21 NOTE — RESULT ENCOUNTER NOTE
Gladys Baker,    This is to inform you regarding your test result.    Your PCR test for Covid-19 was negative .        Sincerely,      Dr.Nasima Wilfredo MD,FACP

## 2020-07-21 NOTE — TELEPHONE ENCOUNTER
[facility] for Bernardo    1) giving verbal on below requested HC orders    2) asking him to call back regarding concerns with SOB on exertion to discuss with triage     Deonna SPRINGER RN

## 2020-07-21 NOTE — TELEPHONE ENCOUNTER
Reason for call:  Home Healthcare Reason for Call:  Home Health Care    Bernardo with [facility] called regarding (reason for call): orders    Orders are needed for this patient.      When pt walks more than 20 ft she becomes out of breath but when she rests she recovers.    Skilled Nursin x wk x 2 wks and 2 prn's for pain management, cardiac assessment    Pt Provider:     Phone Number Homecare Nurse can be reached at: [phone]    Can we leave a detailed message on this number? YES    Phone number patient can be reached at: Home number on file [phone] (home)    Best Time: anytime    Call taken on 2020 at 10:01 AM by Annie Casillas

## 2020-07-21 NOTE — TELEPHONE ENCOUNTER
Spoke with Bernardo:     States that O2 sats were in the upper 90's (99%) at rest- denied labored breathing, RR 16 (note: Pt is not on home O2)     BP:   130/65    Per report from Pt/Spouse: She walks a little ways, has to rest and then is fine      Pt did not ambulate while RN was visiting today, he did not witness dyspnea on exertion     Luckily-- Pt has PT visit scheduled for today, writer asked Bernardo to have PT call Clinic to report on her visit today, and if possible, to assess ambulatory O2 sats.     Bernardo will have PT call during/after visit     Waiting callback     Meredith SULLIVAN RN

## 2020-07-22 NOTE — TELEPHONE ENCOUNTER
[facility] PCP:     Spoke with pt's PT     Saw pt yesterday     Not eating/drinking enough is her main concern, at 11:30am - had not eaten/had enough to drink so she encouraged this    O2 was good - didn't notice concerns with SOB    Did have some elevated HR readings with ambulating 0 up to 121 bpm, went down to 109-11 at the end of session (101 bpm prior to activity).      Working on getting a walker - gait unsteadiness   Nurse visit - next is 7/27   PT next visit - Wed next week   OT - today saw pt     Deonna SPRINGER RN

## 2020-07-29 NOTE — PATIENT INSTRUCTIONS
Continue present management   Check with GI doctor about budesonide prescription which was from their office  Follow up in 6 weeks  Seek sooner medical attention if there is any worsening of symptoms or problems.

## 2020-07-29 NOTE — PROGRESS NOTES
"Olivia Rodriguez is a 75 year old female who is being evaluated via a billable video visit.      The patient has been notified of following:     \"This video visit will be conducted via a call between you and your physician/provider. We have found that certain health care needs can be provided without the need for an in-person physical exam.  This service lets us provide the care you need with a video conversation.  If a prescription is necessary we can send it directly to your pharmacy.  If lab work is needed we can place an order for that and you can then stop by our lab to have the test done at a later time.    Video visits are billed at different rates depending on your insurance coverage.  Please reach out to your insurance provider with any questions.    If during the course of the call the physician/provider feels a video visit is not appropriate, you will not be charged for this service.\"    Patient has given verbal consent for Video visit? Yes  How would you like to obtain your AVS? MyChart  If you are dropped from the video visit, the video invite should be resent to: Text to cell phone: [phone]  Will anyone else be joining your video visit? No      Subjective     Olivia Rodriguez is a 75 year old female who presents today via video visit for the following health issues:    HPI    Medication Followup of all meds    Taking Medication as prescribed: yes    Side Effects:  None    Medication Helping Symptoms:  yes          Video Start Time: 11:11 AM    Patient had endoscopy and colonoscopy done by GI  Patient has Alzheimer's disease so most of the communication was done with her   Patient was also available during the video  She was answering some questions  She is finishing the course of metronidazole  The got the prescription of budesonide tablet from GI doctor but was not sure what is the purpose  They read information on the Internet and found this was for asthma  I called the office of GI and " they said they have prescribed tablets not the inhaler  This was prescribed for the colitis  So I called him back and informed him  Patient says her diarrhea is improving  Her appetite is not good   is encouraging him to eat more as she has lost so much weight  Per  her   Temp is 97  Oxygen saturation is 97%.   135/77        Patient Active Problem List   Diagnosis     Major depressive disorder, recurrent episode, moderate (H)     TMJ (temporomandibular joint syndrome)     S/P total knee arthroplasty     S/P HEATHER-BSO (total abdominal hysterectomy and bilateral salpingo-oophorectomy)     Sensation of feeling cold     Health Care Home     Anxiety     Controlled substance agreement signed     Essential hypertension     Coronary artery calcification     Lung nodule     Trigeminal neuralgia     Status post total bilateral knee replacement     Incisional hernia     Dementia without behavioral disturbance, unspecified dementia type (H)     Personal history of malignant neoplasm of breast     Mixed hyperlipidemia     CKD (chronic kidney disease) stage 3, GFR 30-59 ml/min (H)     Gastroesophageal reflux disease without esophagitis     Diarrhea     Intra-abdominal infection     Past Surgical History:   Procedure Laterality Date     APPENDECTOMY      during c section     BIOPSY  13    right breast biopsy     BIOPSY  13    right axillary lymph node     BREAST SURGERY  13    right breast partial mastectomy/lumpectomy      SECTION       COLONOSCOPY      She had colonoscopy and endoscopy done on November 15, 2012 for workup of iron deficiency anemia and the results were satisfactory     COLONOSCOPY N/A 2020    Procedure: COLONOSCOPY, WITH POLYPECTOMY AND BIOPSY;  Surgeon: Elias Galloway MD;  Location:  GI     ESOPHAGOSCOPY, GASTROSCOPY, DUODENOSCOPY (EGD), COMBINED N/A 2020    Procedure: ESOPHAGOGASTRODUODENOSCOPY, WITH BIOPSY;  Surgeon: Elias Galloway MD;   Location:  GI     EYE SURGERY  2014    cataracts     GYN SURGERY           HEAD & NECK SURGERY      surgery for Trigeminal neuralgia       HYSTERECTOMY TOTAL ABDOMINAL, BILATERAL SALPINGO-OOPHORECTOMY, COMBINED       LAPAROSCOPIC HERNIORRHAPHY INCISIONAL N/A 4/10/2017    Procedure: LAPAROSCOPIC HERNIORRHAPHY INCISIONAL;  Surgeon: Dayanara Kendall MD;  Location: [facility]     MASTECTOMY PARTIAL WITH SENTINEL NODE  2013    Procedure: MASTECTOMY PARTIAL WITH SENTINEL NODE;  RIGHT PARTIAL MASTECTOMY WITH RIGHT AXILLARY SENTINEL NODE BIOPSY;  Surgeon: Kae Padilla MD;  Location: [facility]     ORTHOPEDIC SURGERY      Right TKA and Left TKA       Social History     Tobacco Use     Smoking status: Never Smoker     Smokeless tobacco: Never Used   Substance Use Topics     Alcohol use: Yes     Alcohol/week: 1.0 standard drinks     Comment: 1 or 2 glasses of wine a week     Family History   Problem Relation Age of Onset     Cardiovascular Father      Hypertension Father      Hyperlipidemia Father      Alzheimer Disease Mother      Depression Mother      Genetic Disorder Mother         Alzheimer's symptoms     Cancer - colorectal No family hx of      Breast Cancer No family hx of          Current Outpatient Medications   Medication Sig Dispense Refill     Aspirin-Acetaminophen-Caffeine (EXCEDRIN PO) Take 1 tablet by mouth every morning (for morning headache)       cholecalciferol (VITAMIN D3) 1000 units (25 mcg) capsule Take 1 capsule by mouth daily       lisinopril (PRINIVIL/ZESTRIL) 40 MG tablet Take 1 tablet (40 mg) by mouth daily For high Blood Pressure 90 tablet 3     metroNIDAZOLE (FLAGYL) 250 MG tablet Take 1 tablet (250 mg) by mouth 3 times daily for 14 days 42 tablet 0     mirtazapine (REMERON) 15 MG tablet Take 1 tablet (15 mg) by mouth every evening 30 tablet 1     pantoprazole (PROTONIX) 20 MG EC tablet Take 1 tablet (20 mg) by mouth daily 90 tablet 1     pravastatin (PRAVACHOL) 40 MG tablet Take  1 tablet (40 mg) by mouth At Bedtime For high cholestrol 90 tablet 3     QUEtiapine (SEROQUEL) 25 MG tablet Take 0.5 tablets (12.5 mg) by mouth 2 times daily as needed 20 tablet 1     venlafaxine (EFFEXOR XR) 150 MG 24 hr capsule Take 2 capsules (300 mg) by mouth daily 180 capsule 3       Reviewed and updated as needed this visit by Provider         Review of Systems   Constitutional, HEENT, cardiovascular, pulmonary, gi and gu systems are negative, except as otherwise noted.      Objective    Vitals - Patient Reported  Systolic (Patient Reported): 134  Diastolic (Patient Reported): (!) 95        Physical Exam     GENERAL: Healthy, alert and no distress very well groomed   EYES: Eyes grossly normal to inspection.  No discharge or erythema, or obvious scleral/conjunctival abnormalities.  RESP: No audible wheeze, cough, or visible cyanosis.  No visible retractions or increased work of breathing.    SKIN: Visible skin clear. No significant rash, abnormal pigmentation or lesions.  Memory is fair              Assessment & Plan     Olivia was seen today for recheck medication.    Diagnoses and all orders for this visit:    Weight loss  -     mirtazapine (REMERON) 15 MG tablet; Take 1 tablet (15 mg) by mouth every evening  This was restarted by Dr. Hilary Ledezma  I renewed it  Encourage her to eat anything she likes because she needs calories  By her  her memory care doctor also told her the same thing    Major depressive disorder, recurrent episode, moderate (H)  -     mirtazapine (REMERON) 15 MG tablet; Take 1 tablet (15 mg) by mouth every evening  Lexapro was discontinued and this was added to improve the appetite    Dementia without behavioral disturbance, unspecified dementia type (H)  Currently stable  Follows memory clinic  She has very supportive     Colitis  Patient got the prescription of budesonide tablet   thought it is for asthma as they thought it is inhaler  We clarified that it was  prescribed due to colitis  They will pick it up from the pharmacy  I contacted GI office and got that information    Disclaimer: This note consists of symbols derived from keyboarding, dictation and/or voice recognition software. As a result, there may be errors in the script that have gone undetected. Please consider this when interpreting information found in this chart.         See Patient Instructions  Patient Instructions   Continue present management   Check with GI doctor about budesonide prescription which was from their office  Follow up in 6 weeks  Seek sooner medical attention if there is any worsening of symptoms or problems.          Return in about 6 weeks (around 9/9/2020).    Ame Villalobos MD  [facility]      Video-Visit Details    Type of service:  Video Visit    Video End Time:11:37 AM    Originating Location (pt. Location): Home    Distant Location (provider location):  [facility]     Platform used for Video Visit: Doximity    Return in about 6 weeks (around 9/9/2020).       Ame Villalobos MD

## 2020-07-31 NOTE — TELEPHONE ENCOUNTER
Reason for Call:  Home Health Care    [facility]reason for call): Medication increase    Orders are needed for this patient. Patient taking Remeron to help with patient eating and it is not helping   She is not really eating   They would like to increase the dose    Phone Number Homecare Nurse can be reached at: [phone]    Can we leave a detailed message on this number? Not Applicable      Call taken on 7/31/2020 at 1:04 PM by Sherri Victor

## 2020-07-31 NOTE — TELEPHONE ENCOUNTER
Returned call to Monica, HC nurse.     Confirmed patient has very poor appetite. Spouse requesting increased dose of Remeron to help with that problem.     Last Virtual Visit with Dr Villalobos 7-:  Weight loss  -     mirtazapine (REMERON) 15 MG tablet; Take 1 tablet (15 mg) by mouth every evening  This was restarted by Dr. Hilary Ledezma  I renewed it  Encourage her to eat anything she likes because she needs calories  By her  her memory care doctor also told her the same thing     Please review and advise.    Remeron 15 mg every evening last Rx'd 7----#30 tabs.      Pharmacy pended.     Triage to follow up with patient's spouse, not HC nurse.     Arleen KATZ RN,BSN

## 2020-07-31 NOTE — TELEPHONE ENCOUNTER
Per historical med list---looks like originally Rx'd 6----15 mg every evening.     7- RX was a refill.     Still wait and monitor a little longer?    Thank you,  Arleen KATZ RN,BSN

## 2020-08-01 NOTE — TELEPHONE ENCOUNTER
She was seen by primary 2 days ago who noted the poor appetite and remeron and no changes made.  Will leave for Dr. Villalobos to decide    /Klever Gomes M.D.

## 2020-08-03 NOTE — TELEPHONE ENCOUNTER
Gladys Smiley,    It appears like that overall patient's condition is deteriorating  She was on metronidazole for possible C. Difficile  She took it for 10 days  It was a 14-day course  I told him to stop now as metronidazole can also affect appetite  She is on budesonide tablets for her colitis prescribed by her GI  Increasing mirtazapine would not be much helpful because then the benefit of appetite stimulation would be lost  Encouraged the  to let her eat whatever she wants  Explained the progressive nature of the illness  He is in good understanding  Do you have any other suggestion    Thanks     Dr.Nasima Wilfredo MD

## 2020-08-03 NOTE — TELEPHONE ENCOUNTER
I discussed with the patient about patient's appetite  He thinks that since she has been on metronidazole her appetite is getting worse  She has taken metronidazole for 10 days total  So okay to discontinue  Continue her budesonide tablets prescribed by her GI doctor  At this point her disease is progressing  Discussed with her these are the normal things which happens  We should treat any simple thing which is treatable like bladder infection or any minor thing  But increasing the mirtazapine dose would not help much because then it will not be appetite stimulator at higher dose  Discussed with her that keeping patient comfortable it is very important  We also discussed palliative and hospice care  Her  was very appreciative  He said he will continue to do symptomatic treatment  He also wants to keep patient comfortable  He will keep me posted with patient's progress  Dr.Nasima Wilfredo MD

## 2020-08-04 NOTE — TELEPHONE ENCOUNTER
Spoke with  Kevin who was appreciative of Dr. Villalobos's plan. He is having a meeting with [facility] tomorrow for more information about hospice. He is aware that is the likely direction, but admits its hard to come to that. He will follow up as needed.

## 2020-08-04 NOTE — TELEPHONE ENCOUNTER
Ru Mccloud, yes, I agree with you re: progressive dementia. Thanks for keeping me in the loop. Agree with hospice referral if  is receptive at this time. I can call her  if you think that would be helpful. Hilary

## 2020-08-04 NOTE — TELEPHONE ENCOUNTER
That will help.   wants to make sure he is doing the right things .  If your staff can arrange video visit that will help a lot   Dr.Nasima Wilfredo MD

## 2020-08-13 NOTE — TELEPHONE ENCOUNTER
I also spoke with Kevin and he is ready to move forward and start hospice with Olivia. He knows to contact Dr. Villalobos and myself still with questions which may arise as we are still part of the care team to support him and Olivia.    Hilary Ledezma, DO

## 2020-08-13 NOTE — TELEPHONE ENCOUNTER
Sent to both Dr. Ledezma and Dr. Villalobos.  Pt did just have virtual, and we placed hospice referral.  Looks like son may be looking for your direct opinion on hospice for pt.  Kassandra Otto RN

## 2020-08-13 NOTE — TELEPHONE ENCOUNTER
I can call Kevin this afternoon. Seems like he may have some questions to bounce off of us after his phone call with hospice team.

## 2020-08-13 NOTE — TELEPHONE ENCOUNTER
I spoke to patient's   Answered her questions  Choosing hospice is the right thing at this point  Discussed that what their goals are  Our goal is to keep patient comfortable  He was appreciative of the phone call  Told him that he may hear from Dr. Ledezma also  Dr.Nasima Wilfredo MD

## 2020-09-12 NOTE — PROGRESS NOTES
Dear Dr. Villalobos,     We are notifying you of a Hospice Death.    Olivia Rodriguez; MRN [id number]   peacefully On date 20  Time 7:15 AM  Sincerely [facility]  Felipa Doherty  [phone]

## 2020-09-14 ENCOUNTER — MYC MEDICAL ADVICE (OUTPATIENT)
Dept: FAMILY MEDICINE | Facility: CLINIC | Age: 75
End: 2020-09-14

## 2020-09-14 NOTE — TELEPHONE ENCOUNTER
Made courtesy call to her    Patient  peacefully  He was very appreciative of all the care patient received   Dr.Nasima Wilfredo MD

## 2020-09-21 NOTE — TELEPHONE ENCOUNTER
I also left VM for Kevin to share condolences and appreciated them for letting me be a part of Olivia's care all these years

## 2023-09-14 NOTE — PLAN OF CARE
BEHAVIOR & AGGRESSION TOOL COLOR: green   CIWA SCORE: n/a  ABNL VS/O2: VSS on RA ex some htn, denies pain  MOBILITY: Up w/ 1, GB   PAIN MANAGMENT: denies pain  DIET: Advanced to low fiber diet. Tolerating, but very poor appetite, only ate a couple bites of evening meal  BOWEL/BLADDER: incontinent at times, but does get up to the bathroom. Loose watery stool x4, brown/light green, no signs of bleeding.    ABNL LAB/BG: Creat 1.32; Hgb 10.2  DRAIN/DEVICES: PIV, IVF infusing  TELEMETRY RHYTHM: Pt removed tele  SKIN: bruises   TESTS/PROCEDURES: was scheduled for colonoscopy yesterday, but refused to drink prep.   D/C DAY/GOALS/PLACE: back to [facility], likely tomorrow  OTHER IMPORTANT INFO: BS active, denies abd pain/nausea. Melatonin given at bedtime. Was very impulsive for a few hours. Seroquel available.   COMMIT TO SIT DONE AND SIGNED OFF: done   Pt dc'd home to [facility]. A copy of AVS was printed and sent with pt. Pt transported to facility via [facility].

## (undated) DEVICE — ENDO TROCAR FIRST ENTRY KII FIOS Z-THRD 05X100MM CTF03

## (undated) DEVICE — DEVICE SUTURE GRASPER TROCAR CLOSURE 14GA PMITCSG

## (undated) DEVICE — ESU HARMONIC ACE LAP SHEARS ETHICON ACE+ 7 5MMX36CM HARH36

## (undated) DEVICE — DEVICE TACKER ENDO RELIATACK ARTIC HANDLE RELTACK4XDPT

## (undated) DEVICE — SU VICRYL 3-0 SH 27" J316H

## (undated) DEVICE — GLOVE PROTEXIS MICRO 6.0  2D73PM60

## (undated) DEVICE — PREP CHLORAPREP 26ML TINTED ORANGE  260815

## (undated) DEVICE — LINEN TOWEL PACK X5 5464

## (undated) DEVICE — DRSG STERI STRIP 1/2X4" R1547

## (undated) DEVICE — ESU PENCIL W/HOLSTER E2350H

## (undated) DEVICE — SU VICRYL 0 UR-6 27" J603H

## (undated) DEVICE — ESU CORD MONOPOLAR 10'  E0510

## (undated) DEVICE — SU VICRYL 4-0 PS-2 18" UND J496H

## (undated) DEVICE — ENDO TROCAR OPTICAL 05MM VERSAPORT PLUS W/FIX CAN ONB5STF

## (undated) DEVICE — GLOVE PROTEXIS BLUE W/NEU-THERA 6.5  2D73EB65

## (undated) DEVICE — BLADE CLIPPER 4406

## (undated) DEVICE — PACK LAP CHOLE SLC15LCFSD

## (undated) DEVICE — CATH TRAY FOLEY SURESTEP 16FR W/URINE MTR STATLK LF A303416A

## (undated) DEVICE — DRSG BANDAID 1X3" FABRIC LATEX FREE

## (undated) RX ORDER — FENTANYL CITRATE 50 UG/ML
INJECTION, SOLUTION INTRAMUSCULAR; INTRAVENOUS
Status: DISPENSED
Start: 2020-01-01

## (undated) RX ORDER — DEXAMETHASONE SODIUM PHOSPHATE 4 MG/ML
INJECTION, SOLUTION INTRA-ARTICULAR; INTRALESIONAL; INTRAMUSCULAR; INTRAVENOUS; SOFT TISSUE
Status: DISPENSED
Start: 2017-02-17

## (undated) RX ORDER — CEFAZOLIN SODIUM 2 G/100ML
INJECTION, SOLUTION INTRAVENOUS
Status: DISPENSED
Start: 2017-02-17

## (undated) RX ORDER — FENTANYL CITRATE 50 UG/ML
INJECTION, SOLUTION INTRAMUSCULAR; INTRAVENOUS
Status: DISPENSED
Start: 2017-04-10

## (undated) RX ORDER — CEFAZOLIN SODIUM 2 G/100ML
INJECTION, SOLUTION INTRAVENOUS
Status: DISPENSED
Start: 2017-04-10

## (undated) RX ORDER — FENTANYL CITRATE 50 UG/ML
INJECTION, SOLUTION INTRAMUSCULAR; INTRAVENOUS
Status: DISPENSED
Start: 2017-02-17

## (undated) RX ORDER — ONDANSETRON 2 MG/ML
INJECTION INTRAMUSCULAR; INTRAVENOUS
Status: DISPENSED
Start: 2017-04-10

## (undated) RX ORDER — NEOSTIGMINE METHYLSULFATE 1 MG/ML
VIAL (ML) INJECTION
Status: DISPENSED
Start: 2017-04-10

## (undated) RX ORDER — HYDROMORPHONE HYDROCHLORIDE 1 MG/ML
INJECTION, SOLUTION INTRAMUSCULAR; INTRAVENOUS; SUBCUTANEOUS
Status: DISPENSED
Start: 2017-04-10

## (undated) RX ORDER — ONDANSETRON 2 MG/ML
INJECTION INTRAMUSCULAR; INTRAVENOUS
Status: DISPENSED
Start: 2020-01-01

## (undated) RX ORDER — GLYCOPYRROLATE 0.2 MG/ML
INJECTION, SOLUTION INTRAMUSCULAR; INTRAVENOUS
Status: DISPENSED
Start: 2017-04-10

## (undated) RX ORDER — DEXAMETHASONE SODIUM PHOSPHATE 4 MG/ML
INJECTION, SOLUTION INTRA-ARTICULAR; INTRALESIONAL; INTRAMUSCULAR; INTRAVENOUS; SOFT TISSUE
Status: DISPENSED
Start: 2017-04-10